# Patient Record
Sex: FEMALE | Race: WHITE | Employment: OTHER | URBAN - NONMETROPOLITAN AREA
[De-identification: names, ages, dates, MRNs, and addresses within clinical notes are randomized per-mention and may not be internally consistent; named-entity substitution may affect disease eponyms.]

---

## 2020-01-28 ENCOUNTER — OFFICE VISIT (OUTPATIENT)
Dept: OTOLARYNGOLOGY | Age: 70
End: 2020-01-28
Payer: MEDICARE

## 2020-01-28 ENCOUNTER — PROCEDURE VISIT (OUTPATIENT)
Dept: OTOLARYNGOLOGY | Age: 70
End: 2020-01-28
Payer: MEDICARE

## 2020-01-28 VITALS
WEIGHT: 149.38 LBS | SYSTOLIC BLOOD PRESSURE: 128 MMHG | TEMPERATURE: 97.7 F | DIASTOLIC BLOOD PRESSURE: 78 MMHG | BODY MASS INDEX: 29.33 KG/M2 | HEIGHT: 60 IN

## 2020-01-28 PROCEDURE — 3017F COLORECTAL CA SCREEN DOC REV: CPT | Performed by: NURSE PRACTITIONER

## 2020-01-28 PROCEDURE — G8417 CALC BMI ABV UP PARAM F/U: HCPCS | Performed by: NURSE PRACTITIONER

## 2020-01-28 PROCEDURE — G8400 PT W/DXA NO RESULTS DOC: HCPCS | Performed by: NURSE PRACTITIONER

## 2020-01-28 PROCEDURE — 4004F PT TOBACCO SCREEN RCVD TLK: CPT | Performed by: NURSE PRACTITIONER

## 2020-01-28 PROCEDURE — 1123F ACP DISCUSS/DSCN MKR DOCD: CPT | Performed by: NURSE PRACTITIONER

## 2020-01-28 PROCEDURE — 4040F PNEUMOC VAC/ADMIN/RCVD: CPT | Performed by: NURSE PRACTITIONER

## 2020-01-28 PROCEDURE — G8484 FLU IMMUNIZE NO ADMIN: HCPCS | Performed by: NURSE PRACTITIONER

## 2020-01-28 PROCEDURE — 92567 TYMPANOMETRY: CPT | Performed by: AUDIOLOGIST

## 2020-01-28 PROCEDURE — 69210 REMOVE IMPACTED EAR WAX UNI: CPT | Performed by: NURSE PRACTITIONER

## 2020-01-28 PROCEDURE — 99202 OFFICE O/P NEW SF 15 MIN: CPT | Performed by: NURSE PRACTITIONER

## 2020-01-28 PROCEDURE — 1090F PRES/ABSN URINE INCON ASSESS: CPT | Performed by: NURSE PRACTITIONER

## 2020-01-28 PROCEDURE — G8427 DOCREV CUR MEDS BY ELIG CLIN: HCPCS | Performed by: NURSE PRACTITIONER

## 2020-01-28 RX ORDER — ALPRAZOLAM 0.25 MG/1
TABLET ORAL
COMMUNITY
Start: 2014-02-07

## 2020-01-28 RX ORDER — LANOLIN ALCOHOL/MO/W.PET/CERES
3 CREAM (GRAM) TOPICAL DAILY
COMMUNITY

## 2020-01-28 RX ORDER — FLUTICASONE PROPIONATE 50 MCG
2 SPRAY, SUSPENSION (ML) NASAL DAILY
Qty: 1 BOTTLE | Refills: 5 | Status: SHIPPED | OUTPATIENT
Start: 2020-01-28

## 2020-01-28 SDOH — HEALTH STABILITY: MENTAL HEALTH: HOW OFTEN DO YOU HAVE A DRINK CONTAINING ALCOHOL?: NEVER

## 2020-01-28 ASSESSMENT — ENCOUNTER SYMPTOMS
EYES NEGATIVE: 1
RESPIRATORY NEGATIVE: 1
GASTROINTESTINAL NEGATIVE: 1

## 2020-01-28 NOTE — PROGRESS NOTES
Worry: None     Inability: None    Transportation needs:     Medical: None     Non-medical: None   Tobacco Use    Smoking status: Current Every Day Smoker     Packs/day: 0.50    Smokeless tobacco: Never Used   Substance and Sexual Activity    Alcohol use: Never     Frequency: Never    Drug use: None    Sexual activity: None   Lifestyle    Physical activity:     Days per week: None     Minutes per session: None    Stress: None   Relationships    Social connections:     Talks on phone: None     Gets together: None     Attends Temple service: None     Active member of club or organization: None     Attends meetings of clubs or organizations: None     Relationship status: None    Intimate partner violence:     Fear of current or ex partner: None     Emotionally abused: None     Physically abused: None     Forced sexual activity: None   Other Topics Concern    None   Social History Narrative    None          Current Outpatient Medications   Medication Sig Dispense Refill    ALPRAZolam (XANAX) 0.25 MG tablet TAKE 1 TABLET BY MOUTH THREE TIMES DAILY AS NEEDED FOR ANXIETY      ASA-APAP-Caff Buffered [phone] MG TABS Take 2 tablets by mouth      melatonin 3 MG TABS tablet Take 3 mg by mouth daily      fluticasone (FLONASE) 50 MCG/ACT nasal spray 2 sprays by Each Nostril route daily 1 Bottle 5     No current facility-administered medications for this visit. Review of Systems   Constitutional: Negative. HENT:        See HPI   Eyes: Negative. Respiratory: Negative. Cardiovascular: Negative. Gastrointestinal: Negative. Endocrine: Negative. Genitourinary: Negative. Musculoskeletal: Negative. Skin: Negative. Allergic/Immunologic: Positive for environmental allergies. Neurological: Negative. Hematological: Negative. Psychiatric/Behavioral: Negative.         Physical Exam  /78   Temp 97.7 °F (36.5 °C)   Ht 5' (1.524 m)   Wt 149 lb 6 oz (67.8 kg)   BMI 29.17 kg/m²     CONSTITUTIONAL:   well nourished, well-developed, alert, oriented, in no acute distress able to communicate normally, normal voice quality    HEAD & FACE: normocephalic, no lesions, atraumatic, no tenderness, no masses,appearance normal, no tenderness, no deformities, facial motion symmetric, parotid glands with no tenderness, no swelling, no masses, submandibular glands with normal size, nontender    EYES: ocular motility normal, eyelids normal, orbits normal, no proptosis, conjunctiva normal , pupils equal, round     EARS, NOSE & THROAT:  Hearing: hearing to conversational voice mildly impaired    Ears:     Right Ear:     External: external ears normal     Otoscopy Ear Canal: cerumen impaction removed with curette    Otoscopy TM: TM's normal       Left Ear:     External: external ears normal     Otoscopy Ear Canal: cerumen impaction removed with curette    Otoscopy TM: TM's normal      Nose:    External nose is without deformity    Nose: nares normal, septum midline, mucosa inflamed and mucosa congested      Nasal membranes are without lesions, vestibule within normal limits    Oral:      Lips: normal upper and lower lips without lesion    Teeth: good dentition    Oropharynx:normal Tonsils are normal to inspection without masses or lesions. Palate and uvula are normal.  Posterior pharynx without lesions or erythema. Oral         mucosa is moist without lesions. Tongue: normal tongue is normal to inspection without lesions, normal tongue mobility, lingual mucosa normal    Floor of mouth: Warthin ducts patent, mucosa normal       Laryngeal:      Hypopharynx: not examined     Larynx: not examined       NECK:     Inspection and Palpation: neck appearance normal, no masses or       Tenderness. LYMPHATIC: No cervical lymphadenopathy noted.       CHEST/RESPIRATORY: normal respiratory effort, no shortness of breath or stridor,       CARDIOVASCULAR: no cyanosis or edema      NEUROLOGICAL/PSYCHIATRIC: body will miss the nicotine at first, and you may feel short-tempered and grumpy. You may have trouble sleeping or concentrating. Medicine can help you deal with these symptoms. You may struggle with changing your smoking habits and rituals. The last step is the tricky one: Be prepared for the smoking urge to continue for a time. This is a lot to deal with, but keep at it. You will feel better. Follow-up care is a key part of your treatment and safety. Be sure to make and go to all appointments, and call your doctor if you are having problems. It's also a good idea to know your test results and keep a list of the medicines you take. How can you care for yourself at home? · Ask your family, friends, and coworkers for support. You have a better chance of quitting if you have help and support. · Join a support group, such as Nicotine Anonymous, for people who are trying to quit smoking. · Consider signing up for a smoking cessation program, such as the American Lung Association's Freedom from Smoking program.  · Get text messaging support. Go to the website at www.smokefree. gov to sign up for the [facility] program.  · Set a quit date. Pick your date carefully so that it is not right in the middle of a big deadline or stressful time. Once you quit, do not even take a puff. Get rid of all ashtrays and lighters after your last cigarette. Clean your house and your clothes so that they do not smell of smoke. · Learn how to be a nonsmoker. Think about ways you can avoid those things that make you reach for a cigarette. ? Avoid situations that put you at greatest risk for smoking. For some people, it is hard to have a drink with friends without smoking. For others, they might skip a coffee break with coworkers who smoke. ? Change your daily routine. Take a different route to work or eat a meal in a different place. · Cut down on stress.  Calm yourself or release tension by doing an activity you enjoy, such as reading a book, taking a hot bath, or gardening. · Talk to your doctor or pharmacist about nicotine replacement therapy, which replaces the nicotine in your body. You still get nicotine but you do not use tobacco. Nicotine replacement products help you slowly reduce the amount of nicotine you need. These products come in several forms, many of them available over-the-counter:  ? Nicotine patches  ? Nicotine gum and lozenges  ? Nicotine inhaler  · Ask your doctor about bupropion (Wellbutrin) or varenicline (Chantix), which are prescription medicines. They do not contain nicotine. They help you by reducing withdrawal symptoms, such as stress and anxiety. · Some people find hypnosis, acupuncture, and massage helpful for ending the smoking habit. · Eat a healthy diet and get regular exercise. Having healthy habits will help your body move past its craving for nicotine. · Be prepared to keep trying. Most people are not successful the first few times they try to quit. Do not get mad at yourself if you smoke again. Make a list of things you learned and think about when you want to try again, such as next week, next month, or next year. Where can you learn more? Go to https://[employer]. org and sign in to your [employer] account. Enter O159 in the Vig[employer] box to learn more about \"Stopping Smoking: Care Instructions. \"     If you do not have an account, please click on the \"Sign Up Now\" link. Current as of: July 4, 2019  Content Version: 12.3  © 9283-4134 Healthwise, Incorporated. Care instructions adapted under license by [facility] ([facility]). If you have questions about a medical condition or this instruction, always ask your healthcare professional. [place] [address] any warranty or liability for your use of this information. Return if symptoms worsen or fail to improve.

## 2020-01-28 NOTE — PROGRESS NOTES
Procedure Note  Removal of Cerumen impaction    Preprocedure Diagnosis:  Cerumen Impaction    Postprocedure Diagnosis:  Cerumen Impaction    PROCEDURE NOTE    PROCEDURE:cerumen removal .     ANESTHESIA:None     REASON FOR THE PROCEDURE:The patient presented with cerumen impaction . The patient verbally consented to intervention after a full discussion of risks, benefits, and alternatives. No guarantees were made or implied. DETAILS OF THE PROCEDURE:The patient was seated upright in the exam room chair. The open head otoscope was used to visualize the ear canal and tympanic membrane.  Cerumen was then removed from the both ears using a curette

## 2020-02-14 ENCOUNTER — TRANSCRIBE ORDERS (OUTPATIENT)
Dept: ADMINISTRATIVE | Facility: HOSPITAL | Age: 70
End: 2020-02-14

## 2020-02-14 DIAGNOSIS — F32.A DEPRESSION, UNSPECIFIED DEPRESSION TYPE: Primary | ICD-10-CM

## 2020-02-14 DIAGNOSIS — R91.1 PULMONARY NODULE: ICD-10-CM

## 2020-02-14 DIAGNOSIS — C64.2 MALIGNANT NEOPLASM OF LEFT KIDNEY (HCC): ICD-10-CM

## 2020-02-18 ENCOUNTER — HOSPITAL ENCOUNTER (OUTPATIENT)
Dept: CT IMAGING | Facility: HOSPITAL | Age: 70
Discharge: HOME | End: 2020-02-18

## 2020-02-18 DIAGNOSIS — F32.A DEPRESSION, UNSPECIFIED DEPRESSION TYPE: ICD-10-CM

## 2020-02-18 DIAGNOSIS — R91.1 PULMONARY NODULE: ICD-10-CM

## 2020-02-18 DIAGNOSIS — C64.2 MALIGNANT NEOPLASM OF LEFT KIDNEY (HCC): ICD-10-CM

## 2020-02-19 ENCOUNTER — HOSPITAL ENCOUNTER (OUTPATIENT)
Dept: CT IMAGING | Facility: HOSPITAL | Age: 70
Discharge: HOME | End: 2020-02-19
Admitting: INTERNAL MEDICINE

## 2020-02-19 PROCEDURE — 25010000002 IOPAMIDOL 61 % SOLUTION: Performed by: INTERNAL MEDICINE

## 2020-02-19 PROCEDURE — 71260 CT THORAX DX C+: CPT

## 2020-02-19 RX ADMIN — IOPAMIDOL 100 ML: 612 INJECTION, SOLUTION INTRAVENOUS at 13:15

## 2020-02-23 ENCOUNTER — TRANSCRIBE ORDERS (OUTPATIENT)
Dept: ADMINISTRATIVE | Facility: HOSPITAL | Age: 70
End: 2020-02-23

## 2020-02-23 DIAGNOSIS — C64.9 RENAL CELL CARCINOMA, UNSPECIFIED LATERALITY (HCC): ICD-10-CM

## 2020-02-23 DIAGNOSIS — C34.11 MALIGNANT NEOPLASM OF UPPER LOBE OF RIGHT LUNG (HCC): Primary | ICD-10-CM

## 2020-02-23 DIAGNOSIS — E27.8 ADRENAL NODULE (HCC): ICD-10-CM

## 2020-02-23 DIAGNOSIS — R91.1 PULMONARY NODULE: ICD-10-CM

## 2020-02-23 DIAGNOSIS — R91.8 MASS OF LEFT LUNG: ICD-10-CM

## 2020-03-03 ENCOUNTER — HOSPITAL ENCOUNTER (OUTPATIENT)
Dept: CT IMAGING | Facility: HOSPITAL | Age: 70
Discharge: HOME | End: 2020-03-03

## 2020-03-03 ENCOUNTER — HOSPITAL ENCOUNTER (OUTPATIENT)
Dept: CT IMAGING | Facility: HOSPITAL | Age: 70
Discharge: HOME | End: 2020-03-03
Admitting: INTERNAL MEDICINE

## 2020-03-03 DIAGNOSIS — C64.9 RENAL CELL CARCINOMA, UNSPECIFIED LATERALITY (HCC): ICD-10-CM

## 2020-03-03 DIAGNOSIS — E27.8 ADRENAL NODULE (HCC): ICD-10-CM

## 2020-03-03 DIAGNOSIS — R91.1 PULMONARY NODULE: ICD-10-CM

## 2020-03-03 DIAGNOSIS — C34.11 MALIGNANT NEOPLASM OF UPPER LOBE OF RIGHT LUNG (HCC): ICD-10-CM

## 2020-03-03 PROCEDURE — 78815 PET IMAGE W/CT SKULL-THIGH: CPT

## 2020-03-03 PROCEDURE — 0 FLUDEOXYGLUCOSE F18 SOLUTION: Performed by: INTERNAL MEDICINE

## 2020-03-03 PROCEDURE — A9552 F18 FDG: HCPCS | Performed by: INTERNAL MEDICINE

## 2020-03-03 RX ADMIN — FLUDEOXYGLUCOSE F18 1 DOSE: 300 INJECTION INTRAVENOUS at 11:17

## 2020-03-03 NOTE — NURSING NOTE
Pt for lung biopsy 3/4/2020. Procedure cancelled per Dr Link. Area not safely accessible. Spoke with Erika Jiang's nurse regarding above.

## 2020-03-04 ENCOUNTER — HOSPITAL ENCOUNTER (OUTPATIENT)
Dept: CT IMAGING | Facility: HOSPITAL | Age: 70
Discharge: HOME | End: 2020-03-04

## 2020-03-27 ENCOUNTER — CONSULT (OUTPATIENT)
Dept: ONCOLOGY | Facility: CLINIC | Age: 70
End: 2020-03-27

## 2020-03-27 ENCOUNTER — APPOINTMENT (OUTPATIENT)
Dept: LAB | Facility: HOSPITAL | Age: 70
End: 2020-03-27

## 2020-03-27 VITALS
BODY MASS INDEX: 28.62 KG/M2 | RESPIRATION RATE: 16 BRPM | DIASTOLIC BLOOD PRESSURE: 42 MMHG | HEART RATE: 62 BPM | OXYGEN SATURATION: 95 % | TEMPERATURE: 97.6 F | SYSTOLIC BLOOD PRESSURE: 128 MMHG | WEIGHT: 145.8 LBS | HEIGHT: 60 IN

## 2020-03-27 DIAGNOSIS — C76.0 MALIGNANT NEOPLASM OF HEAD, FACE AND NECK (HCC): Primary | ICD-10-CM

## 2020-03-27 DIAGNOSIS — Z45.2 ENCOUNTER FOR CARE RELATED TO PORT-A-CATH: ICD-10-CM

## 2020-03-27 PROBLEM — Z90.81 H/O SPLENECTOMY: Status: ACTIVE | Noted: 2017-06-01

## 2020-03-27 LAB
ALBUMIN SERPL-MCNC: 4.3 G/DL (ref 3.5–5.2)
ALBUMIN/GLOB SERPL: 1.8 G/DL
ALP SERPL-CCNC: 71 U/L (ref 39–117)
ALT SERPL W P-5'-P-CCNC: 15 U/L (ref 1–33)
ANION GAP SERPL CALCULATED.3IONS-SCNC: 20 MMOL/L (ref 5–15)
AST SERPL-CCNC: 21 U/L (ref 1–32)
BASOPHILS # BLD AUTO: 0.07 10*3/MM3 (ref 0–0.2)
BASOPHILS NFR BLD AUTO: 0.8 % (ref 0–1.5)
BILIRUB SERPL-MCNC: 0.3 MG/DL (ref 0.2–1.2)
BUN BLD-MCNC: 24 MG/DL (ref 8–23)
BUN/CREAT SERPL: 22.4 (ref 7–25)
CALCIUM SPEC-SCNC: 9.2 MG/DL (ref 8.6–10.5)
CHLORIDE SERPL-SCNC: 97 MMOL/L (ref 98–107)
CO2 SERPL-SCNC: 26 MMOL/L (ref 22–29)
CREAT BLD-MCNC: 1.07 MG/DL (ref 0.57–1)
DEPRECATED RDW RBC AUTO: 47.2 FL (ref 37–54)
EOSINOPHIL # BLD AUTO: 0.32 10*3/MM3 (ref 0–0.4)
EOSINOPHIL NFR BLD AUTO: 3.6 % (ref 0.3–6.2)
ERYTHROCYTE [DISTWIDTH] IN BLOOD BY AUTOMATED COUNT: 13.8 % (ref 12.3–15.4)
GFR SERPL CREATININE-BSD FRML MDRD: 51 ML/MIN/1.73
GLOBULIN UR ELPH-MCNC: 2.4 GM/DL
GLUCOSE BLD-MCNC: 133 MG/DL (ref 65–99)
HCT VFR BLD AUTO: 38.5 % (ref 34–46.6)
HGB BLD-MCNC: 13.2 G/DL (ref 12–15.9)
HOLD SPECIMEN: NORMAL
HOLD SPECIMEN: NORMAL
IMM GRANULOCYTES # BLD AUTO: 0.02 10*3/MM3 (ref 0–0.05)
IMM GRANULOCYTES NFR BLD AUTO: 0.2 % (ref 0–0.5)
LYMPHOCYTES # BLD AUTO: 2.3 10*3/MM3 (ref 0.7–3.1)
LYMPHOCYTES NFR BLD AUTO: 25.6 % (ref 19.6–45.3)
MCH RBC QN AUTO: 32.1 PG (ref 26.6–33)
MCHC RBC AUTO-ENTMCNC: 34.3 G/DL (ref 31.5–35.7)
MCV RBC AUTO: 93.7 FL (ref 79–97)
MONOCYTES # BLD AUTO: 0.72 10*3/MM3 (ref 0.1–0.9)
MONOCYTES NFR BLD AUTO: 8 % (ref 5–12)
NEUTROPHILS # BLD AUTO: 5.55 10*3/MM3 (ref 1.7–7)
NEUTROPHILS NFR BLD AUTO: 61.8 % (ref 42.7–76)
NRBC BLD AUTO-RTO: 0 /100 WBC (ref 0–0.2)
PLATELET # BLD AUTO: 362 10*3/MM3 (ref 140–450)
PMV BLD AUTO: 9.9 FL (ref 6–12)
POTASSIUM BLD-SCNC: 4.7 MMOL/L (ref 3.5–5.2)
PROT SERPL-MCNC: 6.7 G/DL (ref 6–8.5)
RBC # BLD AUTO: 4.11 10*6/MM3 (ref 3.77–5.28)
SODIUM BLD-SCNC: 143 MMOL/L (ref 136–145)
WBC NRBC COR # BLD: 8.98 10*3/MM3 (ref 3.4–10.8)

## 2020-03-27 PROCEDURE — 99205 OFFICE O/P NEW HI 60 MIN: CPT | Performed by: INTERNAL MEDICINE

## 2020-03-27 PROCEDURE — 36415 COLL VENOUS BLD VENIPUNCTURE: CPT | Performed by: INTERNAL MEDICINE

## 2020-03-27 PROCEDURE — 80053 COMPREHEN METABOLIC PANEL: CPT | Performed by: INTERNAL MEDICINE

## 2020-03-27 PROCEDURE — 85025 COMPLETE CBC W/AUTO DIFF WBC: CPT | Performed by: INTERNAL MEDICINE

## 2020-03-27 RX ORDER — PYRIDOXINE HCL (VITAMIN B6) 50 MG
50 TABLET ORAL DAILY
COMMUNITY
End: 2020-07-16 | Stop reason: ALTCHOICE

## 2020-03-27 RX ORDER — ALPRAZOLAM 0.25 MG/1
0.25 TABLET ORAL AS NEEDED
COMMUNITY
Start: 2014-02-07 | End: 2021-07-26

## 2020-03-27 RX ORDER — SODIUM CHLORIDE 0.9 % (FLUSH) 0.9 %
10 SYRINGE (ML) INJECTION AS NEEDED
Status: CANCELLED | OUTPATIENT
Start: 2020-03-27

## 2020-03-27 RX ORDER — HEPARIN SODIUM (PORCINE) LOCK FLUSH IV SOLN 100 UNIT/ML 100 UNIT/ML
500 SOLUTION INTRAVENOUS AS NEEDED
Status: CANCELLED | OUTPATIENT
Start: 2020-03-27

## 2020-03-27 RX ORDER — SODIUM CHLORIDE 0.9 % (FLUSH) 0.9 %
10 SYRINGE (ML) INJECTION AS NEEDED
Status: DISCONTINUED | OUTPATIENT
Start: 2020-03-27 | End: 2020-04-14 | Stop reason: HOSPADM

## 2020-03-27 RX ORDER — HEPARIN SODIUM (PORCINE) LOCK FLUSH IV SOLN 100 UNIT/ML 100 UNIT/ML
500 SOLUTION INTRAVENOUS AS NEEDED
Status: DISCONTINUED | OUTPATIENT
Start: 2020-03-27 | End: 2020-04-14 | Stop reason: HOSPADM

## 2020-03-27 RX ORDER — LANOLIN ALCOHOL/MO/W.PET/CERES
3 CREAM (GRAM) TOPICAL NIGHTLY
COMMUNITY

## 2020-03-27 RX ADMIN — HEPARIN SODIUM (PORCINE) LOCK FLUSH IV SOLN 100 UNIT/ML 500 UNITS: 100 SOLUTION at 11:38

## 2020-03-27 RX ADMIN — Medication 10 ML: at 11:36

## 2020-04-24 DIAGNOSIS — C77.0 METASTATIC CANCER TO CERVICAL LYMPH NODES (HCC): Primary | ICD-10-CM

## 2020-04-27 ENCOUNTER — TELEPHONE (OUTPATIENT)
Dept: ONCOLOGY | Facility: CLINIC | Age: 70
End: 2020-04-27

## 2020-04-27 NOTE — TELEPHONE ENCOUNTER
Patient said missed appointment due to not going to pulmonary doctor yet and wanted us to know. Said did not want to reschedule at this time and and will callback when ready to schedule.

## 2020-05-27 ENCOUNTER — OFFICE VISIT (OUTPATIENT)
Dept: PULMONOLOGY | Facility: CLINIC | Age: 70
End: 2020-05-27

## 2020-05-27 VITALS
HEART RATE: 76 BPM | OXYGEN SATURATION: 95 % | RESPIRATION RATE: 16 BRPM | DIASTOLIC BLOOD PRESSURE: 78 MMHG | HEIGHT: 60 IN | BODY MASS INDEX: 28.27 KG/M2 | WEIGHT: 144 LBS | TEMPERATURE: 98.4 F | SYSTOLIC BLOOD PRESSURE: 126 MMHG

## 2020-05-27 DIAGNOSIS — Z85.118 HISTORY OF LUNG CANCER: ICD-10-CM

## 2020-05-27 DIAGNOSIS — R91.1 PULMONARY NODULE: ICD-10-CM

## 2020-05-27 DIAGNOSIS — C64.9 MALIGNANT NEOPLASM OF KIDNEY, UNSPECIFIED LATERALITY (HCC): ICD-10-CM

## 2020-05-27 DIAGNOSIS — R91.1 NODULE OF UPPER LOBE OF LEFT LUNG: Primary | ICD-10-CM

## 2020-05-27 DIAGNOSIS — C77.0 METASTATIC CANCER TO CERVICAL LYMPH NODES (HCC): ICD-10-CM

## 2020-05-27 PROCEDURE — 99204 OFFICE O/P NEW MOD 45 MIN: CPT | Performed by: INTERNAL MEDICINE

## 2020-05-27 NOTE — PROGRESS NOTES
"Subjective   Jennie Molina is a 69 y.o. female.     Background: pt with hypermetabolic 1.5 cm nodule in lingula, fibrosis, smaller nodule 6mm right base.  hx urothelial cancer treated in [place]    Chief Complaint   Patient presents with   • \"Follow up on spot on my lung\".        History of Present Illness   I am asked to see her to evaluate a nodule in the left upper lobe.  This was not present on a scan in 2016 based on my review of records from [facility] in [place].  Outside records from there as well as [facility] indicate prior history of lung cancer on the right side resected and more recently urothelial cancer of the  system.  This is all been treated.  A more recent surveillance CT has identified an asymptomatic lesion in the left upper lobe adjacent to the fissure.  This is been present for less than 40 years continuously and worsening, and of uncertain severity.  A PET scan was done which showed hypermetabolism in this lesion.  Audrey has asked me to see her.    Medical/Family/Social History   has a past medical history of Cancer (CMS/[facility]), Encounter for care related to Port-a-Cath (3/27/2020), and Renal disorder.   has a past surgical history that includes  section; Nephrectomy (Left); Abdominal surgery; Lung removal, partial (Right); Appendectomy; Cholecystectomy; and Hernia repair.  family history includes Cancer in her sister; No Known Problems in her brother, father, maternal aunt, maternal grandfather, maternal grandmother, maternal uncle, mother, paternal aunt, paternal grandfather, paternal grandmother, paternal uncle, and another family member.   reports that she has been smoking cigarettes. She started smoking about 54 years ago. She has a 41.25 pack-year smoking history. She has never used smokeless tobacco. She reports that she does not use drugs.  Allergies   Allergen Reactions   • Iodides Hives and Itching     HIVES AND ITCHING POST CT STUDY ON " "9/4/15     2-24-16 WITH PREMEDICATION PT STILL HAD A REACTION WITH HIVES AND THROAT TIGHTENING   HIVES AND ITCHING POST CT STUDY ON 9/4/15     2-24-16 WITH PREMEDICATION PT STILL HAD A REACTION WITH HIVES AND THROAT TIGHTENING      • Iodinated Diagnostic Agents Hives     Medications    Current Outpatient Medications:   •  ALPRAZolam (XANAX) 0.25 MG tablet, Take 0.25 mg by mouth., Disp: , Rfl:   •  melatonin 3 MG tablet, Take 3 mg by mouth., Disp: , Rfl:   •  SUPER B COMPLEX/C PO, Take  by mouth., Disp: , Rfl:   •  pyridoxine (VITAMIN B-6) 50 MG tablet, Take 50 mg by mouth Daily., Disp: , Rfl:     Review of Systems   Constitutional: Negative for chills and fever.   HENT: Negative for trouble swallowing and voice change.    Eyes: Negative for photophobia and visual disturbance.   Respiratory: Negative for shortness of breath.    Gastrointestinal: Negative for abdominal pain, nausea, vomiting and GERD.   Genitourinary: Negative for difficulty urinating and hematuria.   Musculoskeletal: Negative for gait problem and joint swelling.   Skin: Negative for pallor and skin lesions.   Neurological: Negative for tremors, weakness and confusion.   Hematological: Negative for adenopathy. Does not bruise/bleed easily.   Psychiatric/Behavioral: The patient is not nervous/anxious.          Objective   /78   Pulse 76   Temp 98.4 °F (36.9 °C)   Resp 16   Ht 152.4 cm (60\")   Wt 65.3 kg (144 lb)   SpO2 95% Comment: RA  Breastfeeding No   BMI 28.12 kg/m²   Physical Exam   Constitutional: She is oriented to person, place, and time. She appears well-developed. She does not appear ill. No distress.   HENT:   Head: Atraumatic.   Facemask in place   Eyes: Conjunctivae and EOM are normal. No scleral icterus.   Neck: Neck supple.   Cardiovascular: Normal rate, regular rhythm, S1 normal and S2 normal.   Pulmonary/Chest: Effort normal and breath sounds normal. No stridor. No respiratory distress. She has no wheezes. She has no " rales.   Abdominal: Soft. She exhibits no distension. There is no tenderness. There is no guarding.   Musculoskeletal: She exhibits no edema or deformity.   Neurological: She is alert and oriented to person, place, and time. She exhibits normal muscle tone.   Skin: No rash noted. No erythema. No pallor.   Psychiatric: She has a normal mood and affect.      Whole body PET/CT     Indication: History of lung cancer status post partial RIGHT lobectomy  2004. Also has a history of metastatic renal cell carcinoma treated with  LEFT nephrectomy and chemoradiation 8 years ago. New pulmonary nodules  on recent CT.     Comparison: 02/19/2020     11.4 mCi F-18 FDG was administered IV per protocol via the RIGHT hand.  Blood glucose at the time of injection was 100 a mg/dl.     PET/CT images were obtained from the base of the skull to the proximal  femurs approximately 60 minutes after radiotracer administration.  Noncontrast CT images of the neck, chest, abdomen, and pelvis were  obtained for attenuation correction and anatomic localization. DLP: 542  mGy*cm     Findings:  Background right hepatic lobe metabolic activity measures max SUV 2.93.     *  1.5 cm lobulated pulmonary nodule in the lingula abutting the major  fissure is hypermetabolic with a max SUV of 6.5.  *  Small 6 mm nodular scarlike focus in the RIGHT lung base described on  the prior CT report is not FDG avid but too small to accurately  characterize on PET CT.  *  No other hypermetabolic pulmonary nodule.  *  No hypermetabolic lymph nodes.  *  No abnormal hypermetabolic activity in the neck, abdomen, or pelvis.     Other findings:      No acute orbital finding. Parotid glands appear normal. Parapharyngeal  fat planes are maintained. No neck mass identified.     RIGHT chest wall port catheter tip is at the cavoatrial junction. No  pericardial effusion. No axillary, supraclavicular, mediastinal, or  hilar adenopathy. Main pulmonary trunk and thoracic aorta are  within  normal limits in caliber.     Central airways are clear. Moderate emphysema. Prior RIGHT upper  lobectomy. No consolidation or pleural effusion.     Small RIGHT para midline ventral abdominal wall fat-containing hernia.  Unenhanced liver and pancreas appear unremarkable. 2 cm RIGHT adrenal  gland nodule, most compatible with adenoma. Spleen and LEFT kidney are  surgically absent. No right-sided urinary tract calculi. RIGHT  extrarenal pelvis. No focal urinary bladder abnormality. Uterus appears  unremarkable.     Rectosigmoid anastomosis appears unremarkable. A few scattered colonic  diverticula are present. No abnormal bowel distention or adjacent  inflammation. No ascites or free pelvic fluid. No pelvic mass.     Normal caliber abdominal aorta with atherosclerotic calcification. No  enlarged abdominal or pelvic lymph nodes. No suspicious focal bone  lesion. Multilevel lumbar spine degenerative change.     IMPRESSION:     1.  Hypermetabolic 1.5 cm pulmonary nodule in the lingula, highly  suspicious for metastatic disease or primary lung neoplasm. No other  evidence of metastatic disease or abnormal FDG uptake.  2.  2 cm LEFT adrenal gland nodule, most compatible with adenoma.  3.  6 mm pulmonary nodule at the RIGHT lung base in a background  scarring. This nodule is non-FDG avid but too small to accurately  characterize by PET/CT.  This report was finalized on 03/03/2020 13:52 by Dr. Danny Cochran MD.    My interpretation of CT: Hypermetabolic nodule in the left upper lobe, other chronic postsurgical findings.         -----------------------------------------------------------------------------------------------  Assessment/Plan   Problem List Items Addressed This Visit        Pulmonary Problems    Pulmonary nodule       Other    Malignant neoplasm of kidney (CMS/HCC)    Overview     L urothelial carcinoma, metastatic; S/P left nepherectomy, chemo and radiation.  Will be beginning 3rd course of chemo for  "presumed metastatic lesion of GI tract.  Dr Calhoun is medical oncologist.         Metastatic cancer to cervical lymph nodes (CMS/HCC)    History of lung cancer      Other Visit Diagnoses     Nodule of upper lobe of left lung    -  Primary    Relevant Orders    Ambulatory Referral to Pulmonology (Completed)        Patient's Body mass index is 28.12 kg/m². BMI is within normal parameters. No follow-up required..      This is a patient with a hypermetabolic nodule with a 2 cancers in her history.  This is certainly worrisome for either a bronchogenic carcinoma or metastatic disease to the left lung.  The lesion appears to be in the lingula.  There does appear to be a bronchus sign.  I think this would be most amenable to navigational bronchoscopy to get good tissue sampling of this area.  We discussed that modality is not available locally we will make referral to the [facility] for further evaluation.  In the meantime I do not recommend additional testing or medication.  She does note prior radiation treatment and indicates she has gotten \"the maximum\" radiation for her lifetime.  We did discuss interventions such as stereotactic radiation or surgery if indicated.       Electronically signed by Santi Enriquez MD, 5/27/2020, 11:21    "

## 2020-06-01 ENCOUNTER — TRANSCRIBE ORDERS (OUTPATIENT)
Dept: ADMINISTRATIVE | Facility: HOSPITAL | Age: 70
End: 2020-06-01

## 2020-06-01 ENCOUNTER — HOSPITAL ENCOUNTER (OUTPATIENT)
Dept: CT IMAGING | Facility: HOSPITAL | Age: 70
Discharge: HOME | End: 2020-06-01
Admitting: INTERNAL MEDICINE

## 2020-06-01 DIAGNOSIS — R91.1 LUNG NODULE: Primary | ICD-10-CM

## 2020-06-01 PROCEDURE — 71250 CT THORAX DX C-: CPT

## 2020-06-22 NOTE — PROGRESS NOTES
RICKEY Minor  [facility]   Respiratory Disease Clinic  [address] [place] [address]  Phone: [phone]  Fax: [phone]     Jennie Molina is a 69 y.o. female.   CC:   Chief Complaint   Patient presents with   • Here to discuss test results.        HPI:  Ms. Molina is a pleasant 69 year old female with known history of Non small cell lung cancer in 2004 with right lobectomy. 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 She is a smoker of 55+ years. She was referred to Dr. Enriquez for increasing size of new nodule in the lingular that was hypermetabolic on PET. She was sent to Dr. Hare at [facility] for Navigational bronchoschopy. Pathology results indicate primary squamous cell carcinoma and not metastatic. She currently follows Dr. Ventura in Oncology and just recently established care with them.  She is here today for her results. She reports she has seen Dr. Corral in the past but with the doctor leaving [facility] and a disconnect with the doctor, she would like referral to someone else. She reports a 10 lb weight loss over 6 months. She continues to work at [employer]. She has occasional SOB with allergy flares. She continues to smoke 3/4 PPD x 55 years.     Patient denies fever, chills, cough, shortness of breath or difficulty breathing, fatigue, muscle or body aches, new onset headache, new loss of taste or smell, sore throat, congestion or runny nose, nausea or vomiting, diarrhea.  Patient denies any known exposure to a person under investigation or positive for COVID-19.  Patient is wearing mask today. She has a friend with her who is also wearing a mask.       The following portions of the patient's history were reviewed and updated as appropriate: allergies, current medications, past family history, past medical history, past social history, past surgical history and  problem list.    Past Medical History:   Diagnosis Date   • Cancer (CMS/HCC)    • Encounter for care related to Port-a-Cath 3/27/2020   • Renal disorder        Family History   Problem Relation Age of Onset   • No Known Problems Mother    • No Known Problems Father    • Cancer Sister    • No Known Problems Maternal Grandmother    • No Known Problems Maternal Grandfather    • No Known Problems Paternal Grandmother    • No Known Problems Paternal Grandfather    • No Known Problems Brother    • No Known Problems Maternal Aunt    • No Known Problems Maternal Uncle    • No Known Problems Paternal Aunt    • No Known Problems Paternal Uncle    • No Known Problems Other    • Asthma Neg Hx    • Diabetes Neg Hx    • Emphysema Neg Hx    • Heart failure Neg Hx    • Hypertension Neg Hx        Social History     Socioeconomic History   • Marital status:      Spouse name: Not on file   • Number of children: Not on file   • Years of education: Not on file   • Highest education level: Not on file   Tobacco Use   • Smoking status: Current Every Day Smoker     Packs/day: 0.75     Years: 55.00     Pack years: 41.25     Types: Cigarettes     Start date: 1966   • Smokeless tobacco: Never Used   Substance and Sexual Activity   • Drug use: Never       Review of Systems   Constitutional: Negative for chills, diaphoresis, fatigue and fever.   HENT: Negative for congestion, rhinorrhea and trouble swallowing.    Eyes: Negative for blurred vision, double vision and visual disturbance.   Respiratory: Negative for cough, shortness of breath and wheezing.    Cardiovascular: Negative for chest pain, palpitations and leg swelling.   Gastrointestinal: Negative for abdominal distention, abdominal pain and nausea.   Endocrine: Negative for cold intolerance, heat intolerance and polydipsia.   Musculoskeletal: Negative for back pain, gait problem and myalgias.   Skin: Negative for color change, pallor and rash.   Neurological: Negative for  "dizziness, syncope and confusion.   Hematological: Negative for adenopathy. Does not bruise/bleed easily.   Psychiatric/Behavioral: Negative for agitation, behavioral problems and sleep disturbance.       /76   Pulse 68   Temp 99 °F (37.2 °C)   Resp 16   Ht 152.4 cm (60\")   Wt 65.8 kg (145 lb)   SpO2 96%   Breastfeeding No   BMI 28.32 kg/m²     Physical Exam   Constitutional: She is oriented to person, place, and time. She appears well-developed and well-nourished. No distress.   HENT:   Head: Normocephalic and atraumatic.   Eyes: Pupils are equal, round, and reactive to light. Conjunctivae are normal.   Neck: Normal range of motion. Neck supple.   Cardiovascular: Normal rate, regular rhythm and normal heart sounds. Exam reveals no gallop and no friction rub.   No murmur heard.  Pulmonary/Chest: Effort normal and breath sounds normal. No respiratory distress. She has no wheezes.   Abdominal: Soft. Bowel sounds are normal.   Musculoskeletal: Normal range of motion. She exhibits no edema or deformity.   Lymphadenopathy:     She has no cervical adenopathy.   Neurological: She is alert and oriented to person, place, and time.   Skin: Skin is warm and dry. She is not diaphoretic. No erythema.   Psychiatric: She has a normal mood and affect. Her behavior is normal. Judgment and thought content normal.       Pulmonary Functions Testing Results:      No results found for this or any previous visit.    My interpretation: None     CXR: post bronch was negative for PTX     CT Chest 06/01/20   IMPRESSION:  1. There appears to be slightly increased size of lingula nodule  measuring 1.7 cm, previously 1.5 cm. This is concerning for malignancy.  2. Decreased size of right lower lobe pulmonary nodule now measuring 0.3  cm, previously 0.6 cm.  3. Stable postoperative changes at the right hilum. Left nephrectomy.  4. Stable right adrenal low-density lesion, favored to represent an  adenoma.  This report was finalized on " "06/01/2020 16:10 by Dr Mae Santiago MD.      Problem List Items Addressed This Visit        Immune and Lymphatic    Metastatic cancer to cervical lymph nodes (CMS/HCC)       Genitourinary    Malignant neoplasm of kidney (CMS/HCC)    Overview     L urothelial carcinoma, metastatic; S/P left nepherectomy, chemo and radiation.  Will be beginning 3rd course of chemo for presumed metastatic lesion of GI tract.  Dr Calhoun is medical oncologist.            Other    H/O splenectomy    History of lung cancer      Other Visit Diagnoses     SCC (squamous cell carcinoma of lung), left (CMS/HCC)    -  Primary    Tobacco abuse            Patient's Body mass index is 28.32 kg/m². BMI is within normal parameters. No follow-up required..      Assessment/Plan        She has tried Chantix twice and followed a program. She did not like having to set a quit date and felt like it was having a \"gun to my head\". We dicussed smoking cessation for about 3-5 minutes and included addressing the habit she has formed as well. This could include using suckers while utilizing the patches.     We discussed her pathology results and that this is likely a primary SCC of the left lung. We will get her referred back to Oncology. She had a disconnect with Dr. Ramos and also heard she was leaving town. We will set her up with one of the other oncologist at . I have asked her to return to the office in 3 months.       Anneliese Dave, RICKEY  6/24/2020  09:24    No follow-ups on file.    This dictation was generated by voice recognition computer software. Although all attempts are made to edit dictation for accuracy, there may be errors in the transcription that are not intended.   "

## 2020-06-24 ENCOUNTER — OFFICE VISIT (OUTPATIENT)
Dept: PULMONOLOGY | Facility: CLINIC | Age: 70
End: 2020-06-24

## 2020-06-24 VITALS
HEART RATE: 68 BPM | RESPIRATION RATE: 16 BRPM | BODY MASS INDEX: 28.47 KG/M2 | TEMPERATURE: 99 F | DIASTOLIC BLOOD PRESSURE: 76 MMHG | WEIGHT: 145 LBS | OXYGEN SATURATION: 96 % | SYSTOLIC BLOOD PRESSURE: 132 MMHG | HEIGHT: 60 IN

## 2020-06-24 DIAGNOSIS — C34.92 SCC (SQUAMOUS CELL CARCINOMA OF LUNG), LEFT (HCC): Primary | ICD-10-CM

## 2020-06-24 DIAGNOSIS — C64.2 MALIGNANT NEOPLASM OF LEFT KIDNEY (HCC): ICD-10-CM

## 2020-06-24 DIAGNOSIS — Z85.118 HISTORY OF LUNG CANCER: ICD-10-CM

## 2020-06-24 DIAGNOSIS — Z90.81 H/O SPLENECTOMY: ICD-10-CM

## 2020-06-24 DIAGNOSIS — C77.0 METASTATIC CANCER TO CERVICAL LYMPH NODES (HCC): ICD-10-CM

## 2020-06-24 DIAGNOSIS — Z72.0 TOBACCO ABUSE: ICD-10-CM

## 2020-06-24 PROCEDURE — 99214 OFFICE O/P EST MOD 30 MIN: CPT | Performed by: NURSE PRACTITIONER

## 2020-07-12 NOTE — PROGRESS NOTES
[facility] HEMATOLOGY AND ONCOLOGY  [address]  [place] [address] [phone]    Patient Name: Jennie Molina  Encounter Date: 07/16/2020  YOB: 1950  Patient Number: [id number]    Initial Note    REASON FOR CONSULTATION: Squamous cell lung cancer    HISTORY OF PRESENT ILLNESS: Jennie Molina is a 69-year-old female whose history includes non-small cell lung cancer in the right upper lobe, status post partial resection, 2004, active tobacco use (55+ years smoking history), urothelial carcinoma with positive supraclavicular lymph node biopsy, and left nephrectomy, 2009.  Details unclear, but apparently received chemotherapy and radiation.    Surveillance CTs in October 2019 was found to have a new 5 mm nodule in the left lingula.    02/19/2020-CT chest.  Impression: Largest 1.5 cm nodule at the lingula and new 5 mm pulmonary nodule (reported on outside scan not available).  Changes of right upper lobe resection.  Emphysema.  Right hydronephrosis versus extrarenal pelvis (outside imaging report 9/18/2019 indicates extrarenal pelvis but images not available for comparison).  Right adrenal nodule, incompletely characterized.  Prior splenectomy.  Possible left adrenalectomy and nephrectomy.    03/03/2020-PET scan.  Hypermetabolic 1.5 cm pulmonary nodule in the lingula, highly suspicious for metastatic disease or primary lung neoplasm.  No other evidence of metastatic disease or abnormal FDG uptake.  2 cm left adrenal gland nodule, most compatible with adenoma.  6 mm pulmonary nodule at the right lung base in a background of scarring.  Non-FDG avid but too small to accurately characterize by PET scan.    03/27/2020- seen for medical oncology by Dr. Ventura who referred her to Dr. Enriquez of pulmonary in assessment of tissue biopsy.    05/27/2020- seen by Dr. Enriquez of pulmonary in assessment of the left upper lobe nodule.  Review of  "records from [facility] and [facility] indicate prior history of lung cancer on the right side resected and more recently urothelial cancer of the  system.  All have been treated.  More recent surveillance CT identified an asymptomatic lesion in the left upper lobe adjacent to the fissure.  PET scan showed hypermetabolism in this lesion.  Patient was referred to [place] for possible navigational bronchoscopy/biopsy of the lesion that appears to be in the lingula.  Patient says she has received \"the maximum\" radiation for her lifetime.    06/01/2020-CT chest.  Comparison 02/19/2020, 03/20/2020.  Impression: Slight increase in size of lingular nodule measuring 1.7 cm (previously 1.5 cm) concerning for malignancy.  Decreased size of right lower lobe pulmonary nodule now measures 0.3 cm (from 0.6 cm).  Stable postoperative changes in the right hilum.  Left nephrectomy.  Stable right adrenal low-density lesion, favored to represent an adenoma.    06/10/2020- [facility] (Dr. Hare) bronchoscopy with biopsy.  Pathology: Lung nodule, lingula, transbronchial needle aspiration with cell block: Involved by carcinoma, favor squamous cell carcinoma.    Comment: Immunohistochemistry demonstrates neoplastic cells to be positive for P 40 and negative for ELBA 3, TTF-1 and CK7 consistent with squamous differentiation.  The specimen consists of detached fragments of tumor not appear to be squamous cell carcinoma in situ lining bronchial tissue.  No tumor invading stroma identified in the specimen.  Would favor primary lung carcinoma.    06/10/2020-hemoglobin 12.6, hematocrit 37, MCV 95, platelets 346,000, WBC 10.3.  BMP with calcium of 8.1 otherwise normal with creatinine 0.99.    The patient is seen for subsequent management considerations.    LABS    Lab Results - Last 18 Months   Lab Units 03/27/20  0927   HEMOGLOBIN g/dL 13.2   HEMATOCRIT % 38.5   MCV fL " 93.7   WBC 10*3/mm3 8.98   RDW % 13.8   MPV fL 9.9   PLATELETS 10*3/mm3 362   IMM GRAN % % 0.2   NEUTROS ABS 10*3/mm3 5.55   LYMPHS ABS 10*3/mm3 2.30   MONOS ABS 10*3/mm3 0.72   EOS ABS 10*3/mm3 0.32   BASOS ABS 10*3/mm3 0.07   IMMATURE GRANS (ABS) 10*3/mm3 0.02   NRBC /100 WBC 0.0       Lab Results - Last 18 Months   Lab Units 20  0927   GLUCOSE mg/dL 133*   SODIUM mmol/L 143   POTASSIUM mmol/L 4.7   CO2 mmol/L 26.0   CHLORIDE mmol/L 97*   ANION GAP mmol/L 20.0*   CREATININE mg/dL 1.07*   BUN mg/dL 24*   BUN / CREAT RATIO  22.4   CALCIUM mg/dL 9.2   EGFR IF NONAFRICN AM mL/min/1.73 51*   ALK PHOS U/L 71   TOTAL PROTEIN g/dL 6.7   ALT (SGPT) U/L 15   AST (SGOT) U/L 21   BILIRUBIN mg/dL 0.3   ALBUMIN g/dL 4.30   GLOBULIN gm/dL 2.4         PAST MEDICAL HISTORY:  ALLERGIES:  Allergies   Allergen Reactions   • Iodides Hives and Itching     HIVES AND ITCHING POST CT STUDY ON 9/4/15     2-24-16 WITH PREMEDICATION PT STILL HAD A REACTION WITH HIVES AND THROAT TIGHTENING   HIVES AND ITCHING POST CT STUDY ON 9/4/15     2-24-16 WITH PREMEDICATION PT STILL HAD A REACTION WITH HIVES AND THROAT TIGHTENING      • Iodinated Diagnostic Agents Hives     CURRENT MEDICATIONS:  Outpatient Encounter Medications as of 2020   Medication Sig Dispense Refill   • ALPRAZolam (XANAX) 0.25 MG tablet Take 0.25 mg by mouth As Needed.     • melatonin 3 MG tablet Take 3 mg by mouth.     • naproxen sodium (ALEVE) 220 MG tablet Take 220 mg by mouth 2 (Two) Times a Day As Needed.     • SUPER B COMPLEX/C PO Take  by mouth.     • pyridoxine (VITAMIN B-6) 50 MG tablet Take 50 mg by mouth Daily.       No facility-administered encounter medications on file as of 2020.        Adult illnesses:  Left urothelial carcinoma, metastatic with positive supraclavicular lymph node biopsy, 2009.  Lung cancer  Hives after CT contrast  Active tobacco use (55+ years)    Past surgeries:  Port-A-Cath placement, 3/27/2020   section  Left  nephrectomy/adrenalectomy  Right upper lobe lung resection, 2004  Appendectomy  Cholecystectomy  Splenectomy  Hernia repair  Cataracts removed with lenses  Blepharoplasty, OU  06/10/2020- transbronchial needle aspiration of lingular lung nodule ([place]).  Carcinoma, favor squamous cell carcinoma.    ADULT ILLNESSES:  Patient Active Problem List   Diagnosis Code   • Abnormal Pap smear, low grade squamous intraepithelial lesion (LGSIL) [id number]   • Acute bilateral back pain M54.9   • Adenopathy, cervical R59.0   • Allergic rhinitis J30.9   • Benign neoplasm of adrenal gland D35.00   • Bilateral cataracts H26.9   • Bilateral hearing loss H91.93   • Bilateral impacted cerumen H61.23   • Carpal tunnel syndrome G56.00   • Cervical high risk HPV (human papillomavirus) test positive R87.810   • Diverticulosis of large intestine K57.30   • Encounter for health-related screening Z13.9   • Leiomyoma of uterus, unspecified D25.9   • H/O splenectomy Z90.81   • Malignant neoplasm of kidney (CMS/HCC) C64.9   • Malignant neoplasm of lung (CMS/HCC) C34.90   • Metastatic cancer to cervical lymph nodes (CMS/HCC) C77.0   • Neck mass R22.1   • Plantar fasciitis M72.2   • Pulmonary nodule R91.1   • Restless legs syndrome (RLS) G25.81   • Malignant neoplasm (CMS/HCC) C80.1   • Encounter for care related to Port-a-Cath Z45.2   • History of lung cancer Z85.118   • Metastatic renal cell carcinoma (CMS/HCC) C64.9   • Other nonspecific abnormal finding R68.89     SURGERIES:  Past Surgical History:   Procedure Laterality Date   • ABDOMINAL SURGERY     • APPENDECTOMY     •  SECTION     • CHOLECYSTECTOMY     • HERNIA REPAIR     • LUNG REMOVAL, PARTIAL Right    • NEPHRECTOMY Left      HEALTH MAINTENANCE ITEMS:  Health Maintenance Due   Topic Date Due   • MAMMOGRAM  1950   • ZOSTER VACCINE (1 of 2) 1999   • HEPATITIS C SCREENING  2020   • MEDICARE ANNUAL WELLNESS  2020   • COLONOSCOPY  2020       <no  information>  Last Completed Colonoscopy       Status Date      COLONOSCOPY No completions recorded        Immunization History   Administered Date(s) Administered   • FLUAD TRI 65YR+ 09/14/2019   • Fluzone High Dose =>65 Years ([employer] ONLY) 09/14/2019   • H1N1 Inj 11/20/2009   • Influenza, Unspecified 10/09/2012, 10/08/2013, 10/08/2013, 10/08/2014, 10/12/2015, 10/13/2016   • Pneumococcal Conjugate 13-Valent (PCV13) 09/15/2016   • Pneumococcal Polysaccharide (PPSV23) 05/16/2018   • Tdap 05/16/2018     Last Completed Mammogram       Status Date      MAMMOGRAM No completions recorded            FAMILY HISTORY:  Family History   Problem Relation Age of Onset   • No Known Problems Mother    • No Known Problems Father    • Cancer Sister    • No Known Problems Maternal Grandmother    • No Known Problems Maternal Grandfather    • No Known Problems Paternal Grandmother    • No Known Problems Paternal Grandfather    • No Known Problems Brother    • No Known Problems Maternal Aunt    • No Known Problems Maternal Uncle    • No Known Problems Paternal Aunt    • No Known Problems Paternal Uncle    • No Known Problems Other    • Asthma Neg Hx    • Diabetes Neg Hx    • Emphysema Neg Hx    • Heart failure Neg Hx    • Hypertension Neg Hx      SOCIAL HISTORY:  Social History     Socioeconomic History   • Marital status:      Spouse name: Not on file   • Number of children: Not on file   • Years of education: Not on file   • Highest education level: Not on file   Tobacco Use   • Smoking status: Current Every Day Smoker     Packs/day: 0.75     Years: 55.00     Pack years: 41.25     Types: Cigarettes     Start date: 1966   • Smokeless tobacco: Never Used   Substance and Sexual Activity   • Drug use: Never       REVIEW OF SYSTEMS:  Review of Systems   Constitutional: Positive for fatigue. Negative for activity change, appetite change, chills, diaphoresis, fever, unexpected weight gain and unexpected weight loss.        Manages  "all her ADLs.  Lives alone. Still works part time at [employer]   HENT: Negative.    Eyes: Negative.    Respiratory: Positive for cough (\"smokers\" worse in the morning, productive of white phlegm) and shortness of breath (Admits to MEHTA and some SOB with her routine activities). Negative for chest tightness and wheezing.         Is still smoking 3/4 - 1 ppd - started age 14   Cardiovascular: Negative.    Gastrointestinal: Negative.    Genitourinary: Negative.    Musculoskeletal: Positive for arthralgias (shoulders, knees, back, left hip, hands) and back pain (Left side radicular symptoms sometimes). Negative for joint swelling, myalgias and neck pain.   Skin: Negative.    Allergic/Immunologic: Positive for environmental allergies (\"pollen\").   Neurological: Positive for numbness (left hand, \"carpal tunnel\"). Negative for dizziness.   Hematological: Negative.    Psychiatric/Behavioral: Positive for depressed mood (\"time to time\"). The patient is nervous/anxious.        /68   Pulse 84   Temp 97.1 °F (36.2 °C)   Resp 16   Ht 152.4 cm (60\")   Wt 64.8 kg (142 lb 14.4 oz)   SpO2 97%   Breastfeeding No   BMI 27.91 kg/m²  Body surface area is 1.62 meters squared.  Pain Score    07/16/20 1506   PainSc: 0-No pain       Physical Exam:  Physical Exam   Constitutional: She is oriented to person, place, and time. No distress.   Pleasant, heavy set, modestly kept elderly female.  Ambulatory.  ECOG 1.  Here with her friend, Karyn SANTORO:   Head: Normocephalic and atraumatic.   Wearing a surgical mask    Scarring of the left supraclavicular area from prior neck surgery   Eyes: Pupils are equal, round, and reactive to light. Conjunctivae and EOM are normal. No scleral icterus.   Neck: Normal range of motion. Neck supple. No JVD present. No tracheal deviation present. No thyromegaly present.   Cardiovascular: Normal rate and regular rhythm. Exam reveals no friction rub.   Pulmonary/Chest: No stridor. She has no wheezes. She " has no rales.   Distant breath sounds   Abdominal: Soft. Bowel sounds are normal. She exhibits no distension and no mass. There is no tenderness. There is no rebound and no guarding.   Musculoskeletal: Normal range of motion. She exhibits deformity (Osteoarthritic changes of the hands). She exhibits no edema.   Lymphadenopathy:     She has no cervical adenopathy.   Neurological: She is alert and oriented to person, place, and time. No cranial nerve deficit.   Skin: Skin is warm and dry. No rash noted. No erythema.   Psychiatric: She has a normal mood and affect. Her behavior is normal. Thought content normal.   Vitals reviewed.      Assessment:  1.   Squamous cell lung carcinoma            Tumor stage: TNM IA2 (cT1b,cN0, M0)            Tumor [place]: 1.7 cm nodule in the lingula.  No other PET evidence for metastatic disease.            Complications of tumor: None            Tumor status: Untreated.              - Radiographically progressing (has grown to 1.7cm on chest CT, 06/01/2020 from 1.5 cm on chest CT, 02/19/2020).    2.   History of non-small cell lung cancer in the right upper lobe, status post right upper lobectomy, 2004.  No details  3.   Metastatic urothelial carcinoma with positive supraclavicular lymph node biopsy and left nephrectomy, 2009.  Details unclear but apparently received chemotherapy and radiation.  4.   Tobacco use, 55+ years cigarette smoking      Recommendations:  1.   Apprised of available diagnostic information.  Explained that she appears to have a new primary squamous cell lung carcinoma.  2.   Request pathology at [place] to send out biopsy specimen on 6/10/2020: EGFR, ALK, ROS 1, PDL1, BRAF.  3.   Schedule brain MRI with and without contrast [facility].  4.   Review NCCN guidelines version 6.2020 non-small cell lung cancer-stage Ia (peripheral T1 abc, N0).  If operable: Surgical exploration and resection plus mediastinal lymph node dissection/sampling.  Adjuvant chemotherapy only if  node positive.  If inoperable: Definitive RT including stereotactic ablative radiotherapy (ABR).  5.   Appoint to Dr. Wolfe Re: Assess for possible stereotactic radiation to the solitary lingular lesion.  6.   Appoint to Dr. Healy Re: Assess for possible surgical options for the solitary lingular lesion.  7.   Return to office in 4 to 6 weeks.  Further recommendations pending, but no role for systemic therapy upfront.    I spent 65 total minutes, face-to-face, caring for Jennie today.  Greater than 50% of this time involved counseling and/or coordination of care as documented within this note regarding the patient's illness(es), pros and cons of various treatment options, instructions and/or risk reduction.

## 2020-07-16 ENCOUNTER — LAB (OUTPATIENT)
Dept: LAB | Facility: HOSPITAL | Age: 70
End: 2020-07-16

## 2020-07-16 ENCOUNTER — OFFICE VISIT (OUTPATIENT)
Dept: ONCOLOGY | Facility: CLINIC | Age: 70
End: 2020-07-16

## 2020-07-16 VITALS
WEIGHT: 142.9 LBS | DIASTOLIC BLOOD PRESSURE: 68 MMHG | HEART RATE: 84 BPM | RESPIRATION RATE: 16 BRPM | OXYGEN SATURATION: 97 % | TEMPERATURE: 97.1 F | BODY MASS INDEX: 28.06 KG/M2 | HEIGHT: 60 IN | SYSTOLIC BLOOD PRESSURE: 118 MMHG

## 2020-07-16 DIAGNOSIS — C34.02 MALIGNANT NEOPLASM OF HILUS OF LEFT LUNG (HCC): Primary | ICD-10-CM

## 2020-07-16 PROCEDURE — 99215 OFFICE O/P EST HI 40 MIN: CPT | Performed by: INTERNAL MEDICINE

## 2020-07-16 RX ORDER — NAPROXEN SODIUM 220 MG
220 TABLET ORAL 2 TIMES DAILY PRN
COMMUNITY

## 2020-07-20 ENCOUNTER — HOSPITAL ENCOUNTER (OUTPATIENT)
Dept: MRI IMAGING | Facility: HOSPITAL | Age: 70
Discharge: HOME | End: 2020-07-20
Admitting: INTERNAL MEDICINE

## 2020-07-20 DIAGNOSIS — C34.02 MALIGNANT NEOPLASM OF HILUS OF LEFT LUNG (HCC): ICD-10-CM

## 2020-07-20 LAB — CREAT BLDA-MCNC: 1.1 MG/DL (ref 0.6–1.3)

## 2020-07-20 PROCEDURE — 0 GADOBENATE DIMEGLUMINE 529 MG/ML SOLUTION: Performed by: INTERNAL MEDICINE

## 2020-07-20 PROCEDURE — A9577 INJ MULTIHANCE: HCPCS | Performed by: INTERNAL MEDICINE

## 2020-07-20 PROCEDURE — 70553 MRI BRAIN STEM W/O & W/DYE: CPT

## 2020-07-20 PROCEDURE — 82565 ASSAY OF CREATININE: CPT

## 2020-07-20 RX ADMIN — GADOBENATE DIMEGLUMINE 12 ML: 529 INJECTION, SOLUTION INTRAVENOUS at 09:44

## 2020-07-24 PROBLEM — F17.200 CURRENT EVERY DAY SMOKER: Status: ACTIVE | Noted: 2020-07-24

## 2020-07-27 ENCOUNTER — DOCUMENTATION (OUTPATIENT)
Dept: RADIATION ONCOLOGY | Facility: HOSPITAL | Age: 70
End: 2020-07-27

## 2020-07-27 ENCOUNTER — CONSULT (OUTPATIENT)
Dept: RADIATION ONCOLOGY | Facility: HOSPITAL | Age: 70
End: 2020-07-27

## 2020-07-27 ENCOUNTER — HOSPITAL ENCOUNTER (OUTPATIENT)
Dept: RADIATION ONCOLOGY | Facility: HOSPITAL | Age: 70
Setting detail: RADIATION/ONCOLOGY SERIES
End: 2020-07-27

## 2020-07-27 VITALS
SYSTOLIC BLOOD PRESSURE: 129 MMHG | DIASTOLIC BLOOD PRESSURE: 86 MMHG | BODY MASS INDEX: 28.27 KG/M2 | HEIGHT: 60 IN | WEIGHT: 144 LBS | HEART RATE: 75 BPM

## 2020-07-27 DIAGNOSIS — F17.200 CURRENT EVERY DAY SMOKER: ICD-10-CM

## 2020-07-27 DIAGNOSIS — Z90.2 HISTORY OF LOBECTOMY OF LUNG: ICD-10-CM

## 2020-07-27 DIAGNOSIS — C34.12 MALIGNANT NEOPLASM OF LINGULA OF LEFT LUNG (HCC): Primary | ICD-10-CM

## 2020-07-27 PROCEDURE — G0463 HOSPITAL OUTPT CLINIC VISIT: HCPCS | Performed by: RADIOLOGY

## 2020-07-27 NOTE — PROGRESS NOTES
MYRTLE met with Ms. Molina due to a distress score of six. She is a 69 year old female who came for a radiation consultation today. She moved to [place] in Nov of 2019 from [place]. Her support system is limited she has a couple friends in [place] who can help her and her daughter lives in [place]. Ms. Molina currently works at [employer] but in [place] was an  for a long term care facility. She said at times she will worry about her finances due to retiring from her career last year and adjusting to a new budget. 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 She also said she was nervous about the doctor recommending surgery because she was not sure if she would be able to care for her dog. The doctor answered her questions and she said she should be able to find a friend to help with her dog or a place to board him. MYRTLE informed her about different facilities to board her dog. Mr. Molina said it would be difficult because her dog would suffer from separation anxiety and he has Lyme disease.  No further needs at this time, Ms. Molina will contact this writer if any needs arise in future.

## 2020-07-29 ENCOUNTER — TELEPHONE (OUTPATIENT)
Dept: RADIATION ONCOLOGY | Facility: HOSPITAL | Age: 70
End: 2020-07-29

## 2020-07-30 ENCOUNTER — TELEPHONE (OUTPATIENT)
Dept: RADIATION ONCOLOGY | Facility: HOSPITAL | Age: 70
End: 2020-07-30

## 2020-07-30 NOTE — TELEPHONE ENCOUNTER
Contacted Dr. Hand's office regarding Ms. Molina. I was provided appt in their office for Monday, 8/3/20 @ 2:30 PM. I have requested a CD of 2020 images be sent to their office. I have also faxed medical records ([phone]) to their office for this pt. Contacted the pt and provided her the appt time and address.

## 2020-08-26 ENCOUNTER — DOCUMENTATION (OUTPATIENT)
Dept: RADIATION ONCOLOGY | Facility: HOSPITAL | Age: 70
End: 2020-08-26

## 2020-08-26 NOTE — PROGRESS NOTES
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  She is here alone.    DIAGNOSTIC ABNORMALITIES/PREVIOUS INTERVENTIONS:         1.   History of active tobacco use (55+ years smoking history), urothelial carcinoma with positive supraclavicular lymph node biopsy, and left nephrectomy, 2009.  Had received chemotherapy and radiation.          -    2004 - History of right lung cancer:   · Station 7 node, biopsy:    ? Negative for carcinoma.  · Station 4, biopsy:    ? Negative for carcinoma.  · Lung, right upper lobe, mass, excisional biopsy:   ? Poorly differentiated non-small cell carcinoma (see microscopic description).  · Lymph node, station 7, resection:  ? Thirteen lymph nodes, negative for tumor (0/13).  · Lymph node, station 4R, resection:  ? Eighteen lymph nodes, negative for tumor (0/18).  · Lymph node, station 11R, resection:  ? Four lymph nodes, negative for tumor (0/4).  · Rib, 5th, resection:    ? Negative for carcinoma.  · Lymph node, station 10R, resection:  ? One lymph node, negative for tumor (0/1).  · Right upper lobe mass, excisional biopsy:  ? Poorly differentiated non small cell carcinoma; see Tumor Characteristics and comment.  ? Tumor extends to the inked pleural surface.  · Right upper lobe, lobectomy:  ? Focal hemorrhage and collapse.  ? Negative for carcinoma.                     -    2009 - History of urothelial carcinoma:   10/21/2009:  Left kidney, radial nephrectomy:  · High grade urothelial carcinoma with extensive necrosis.  · Tumor arises in renal pelvis (in situ carcinoma) and invades through  kidney to perinephric fat.  · Lymphovascular invasion: Not identified.  · Margins: Negative.  · One lymph node, negative for malignancy (0/1).  Left hilar lymph node:  · One lymph node, negative for malignancy (0/1).  Spleen:  · Spleen without significant pathologic change           -    11/13/2009:  Left supraclavicular lymph node:  Metastatic high grade carcinoma involving one of four lymph nodes (1/4), see comment.           2.   Surveillance CTs in October 2019 was found to have a new 5 mm nodule in the left lingula.         3.   02/19/2020-CT chest.  Impression: Largest 1.5 cm nodule at the lingula and new 5 mm pulmonary nodule (reported on outside scan not available).  Changes of right upper lobe resection.  Emphysema.  Right hydronephrosis versus extrarenal pelvis (outside imaging report 9/18/2019 indicates extrarenal pelvis but images not available for comparison).  Right adrenal nodule, incompletely characterized.  Prior splenectomy.  Possible left adrenalectomy and nephrectomy.         4.   03/03/2020-PET scan.  Hypermetabolic 1.5 cm pulmonary nodule in the lingula, highly suspicious for metastatic disease or primary lung neoplasm.  No other evidence of metastatic disease or abnormal FDG uptake.  2 cm left adrenal gland nodule, most compatible with adenoma.  6 mm pulmonary nodule at the right lung base in a background of scarring.  Non-FDG avid but too small to accurately characterize by PET scan.         5.   03/27/2020- seen for medical oncology by Dr. Ventura who referred her to Dr. Enriquez of pulmonary in assessment of tissue biopsy.         6.   05/27/2020- seen by Dr. Enriquez of pulmonary in assessment of the left upper lobe nodule.  Review of records from [facility] and [facility] indicate prior history of lung cancer on the right side resected and more recently urothelial cancer of the  system.  All have been treated.  More recent surveillance CT identified  "an asymptomatic lesion in the left upper lobe adjacent to the fissure.  PET scan showed hypermetabolism in this lesion.  Patient was referred to [facility] for possible navigational bronchoscopy/biopsy of the lesion that appears to be in the lingula.  Patient says she has received \"the maximum\" radiation for her lifetime.         7.   06/01/2020-CT chest.  Comparison 02/19/2020, 03/20/2020.  Impression: Slight increase in size of lingular nodule measuring 1.7 cm (previously 1.5 cm) concerning for malignancy.  Decreased size of right lower lobe pulmonary nodule now measures 0.3 cm (from 0.6 cm).  Stable postoperative changes in the right hilum.  Left nephrectomy.  Stable right adrenal low-density lesion, favored to represent an adenoma.         8.   06/10/2020- [facility] (Dr. Hare) bronchoscopy with biopsy.  Pathology: LUNG, LINGULA, TRANSBRONCHIAL BIOPSY:   · AT LEAST SQUAMOUS CELL CARCINOMA IN SITU.  · Immunohistochemistry demonstrates the neoplastic cells to be positive for p40 and negative for GATA3 and CK7, consistent with squamous differentiation.  LUNG NODULE, LINGULA, TRANSBRONCHIAL NEEDLE ASPIRATION WITH CELL BLOCK:   · INVOLVED BY CARINOMA, FAVOR SQUAMOUS CELL CARCINOMA (SEE COMMENT).  Comment: Immunohistochemical stains performed with appropriate controls show that the tumor cells are positive for p40 and negative for ELBA-3 and TTF-1. While this immunophenotype and cytomorphology can be seen in both a primary squamous cell carcinoma of the lung and metastatic urothelial carcinoma, the lack of reactivity for ELBA-3 and the presence of carcinoma in situ in the corresponding surgical biopsy [id number] would favor a primary lung carcinoma.            9.   06/10/2020-hemoglobin 12.6, hematocrit 37, MCV 95, platelets 346,000, WBC 10.3.  BMP with calcium of 8.1 otherwise normal with creatinine 0.99.      10.   07/20/2020- MRI brain w and wo contrast: No metastatic disease.  Mild " chronic microvascular changes.      11. 08/25/2020- robotic lingular segmentectomy (Dr. Moshe Hand).  Diagnosis: A.lung, lingular segmentectomy: Squamous cell carcinoma, 2.2 cm in greatest extent; negative for pleural invasion; surgical margins negative for tumor; 1 benign lymph node (see synoptic).  B.lymph node, level 9L excision: 1 benign lymph node.  C.lymph node, level 8L, excision: 2 benign lymph nodes.  D.lymph node, 7L, excision: 1 benign lymph node.  E.lymph node, level 5, excision: 3 benign lymph nodes.  F.lymph node, 12 L, excision: 1 benign lymph node.  G.lymph node, level 11 L, excision: 4 benign lymph nodes.  H.lymph node, 13 L, excision: At least one benign lymph node.  I.bronchial staple line: Unremarkable bronchus; negative for tumor.  J.lung, final parenchymal margin: Unremarkable lung parenchyma.  Synoptic: Lung specimen-procedure: Segmentectomy.  Specimen laterality: Left.  Spread through airspaces (STS): Not identified.  Total tumor size (size of entire tumor): Greatest dimension 2.2 cm.  Size of invasive component: 2.2 cm.  Tumor focality: Single focus.  Visceropleural invasion: Not identified.  Direct invasion of adjacent structures: No adjacent structures present.  Treatment effect: No known presurgical therapy.  Lymphovascular invasion: Not identified.  Margins: Uninvolved by tumor.  Lymph nodes: Number of lymph nodes involved: 0.  Number of lymph nodes examined: 14.  Lev stations examined: 7: Subcarinal, 5: Sub-aortic/aortopulmonary/AP window, 8L: Paraesophageal, 9L, pulmonary ligament, 11 L, interlobar, 12 L, lobar 13 L segmental.  Pathologic stage classification (P TNM, AJCC eighth edition).  Primary tumor (pT): pT1c, regional lymph nodes (pN): pN0.  Likely insufficient material for molecular testing.    LABS    Lab Results - Last 18 Months   Lab Units 03/27/20  0927   HEMOGLOBIN g/dL 13.2   HEMATOCRIT % 38.5   MCV fL 93.7   WBC 10*3/mm3 8.98   RDW % 13.8   MPV fL 9.9   PLATELETS  10*3/mm3 362   IMM GRAN % % 0.2   NEUTROS ABS 10*3/mm3 5.55   LYMPHS ABS 10*3/mm3 2.30   MONOS ABS 10*3/mm3 0.72   EOS ABS 10*3/mm3 0.32   BASOS ABS 10*3/mm3 0.07   IMMATURE GRANS (ABS) 10*3/mm3 0.02   NRBC /100 WBC 0.0       Lab Results - Last 18 Months   Lab Units 07/20/20  0940 03/27/20  0927   GLUCOSE mg/dL  --  133*   SODIUM mmol/L  --  143   POTASSIUM mmol/L  --  4.7   CO2 mmol/L  --  26.0   CHLORIDE mmol/L  --  97*   ANION GAP mmol/L  --  20.0*   CREATININE mg/dL 1.10 1.07*   BUN mg/dL  --  24*   BUN / CREAT RATIO   --  22.4   CALCIUM mg/dL  --  9.2   EGFR IF NONAFRICN AM mL/min/1.73  --  51*   ALK PHOS U/L  --  71   TOTAL PROTEIN g/dL  --  6.7   ALT (SGPT) U/L  --  15   AST (SGOT) U/L  --  21   BILIRUBIN mg/dL  --  0.3   ALBUMIN g/dL  --  4.30   GLOBULIN gm/dL  --  2.4         PAST MEDICAL HISTORY:  ALLERGIES:  Allergies   Allergen Reactions   • Iodides Hives and Itching     HIVES AND ITCHING POST CT STUDY ON 9/4/15     2-24-16 WITH PREMEDICATION PT STILL HAD A REACTION WITH HIVES AND THROAT TIGHTENING   HIVES AND ITCHING POST CT STUDY ON 9/4/15     2-24-16 WITH PREMEDICATION PT STILL HAD A REACTION WITH HIVES AND THROAT TIGHTENING      • Iodinated Diagnostic Agents Hives     CURRENT MEDICATIONS:  Outpatient Encounter Medications as of 8/31/2020   Medication Sig Dispense Refill   • ALPRAZolam (XANAX) 0.25 MG tablet Take 0.25 mg by mouth As Needed.     • gabapentin (NEURONTIN) 100 MG capsule Take 100 mg by mouth 3 (Three) Times a Day.     • HYDROcodone-acetaminophen (NORCO) 5-325 MG per tablet Take 1 tablet by mouth Every 6 (Six) Hours As Needed.     • melatonin 3 MG tablet Take 3 mg by mouth Every Night.     • naproxen sodium (ALEVE) 220 MG tablet Take 220 mg by mouth 2 (Two) Times a Day As Needed.     • SUPER B COMPLEX/C PO Take  by mouth Daily.       No facility-administered encounter medications on file as of 8/31/2020.        Adult illnesses:  Left urothelial carcinoma, metastatic with positive  supraclavicular lymph node biopsy, 2009.  Lung cancer  Hives after CT contrast  Active tobacco use (55+ years)    Past surgeries:  Port-A-Cath placement, 3/27/2020   section  Left nephrectomy/adrenalectomy  Right upper lobe lung resection,   Appendectomy  Cholecystectomy  Splenectomy  Hernia repair  Cataracts removed with lenses  Blepharoplasty, [facility]U  06/10/2020- transbronchial needle aspiration of lingular lung nodule ([place]).  Carcinoma, favor squamous cell carcinoma.    ADULT ILLNESSES:  Patient Active Problem List   Diagnosis Code   • Abnormal Pap smear, low grade squamous intraepithelial lesion (LGSIL) [id number]   • Acute bilateral back pain M54.9   • Adenopathy, cervical R59.0   • Allergic rhinitis J30.9   • Benign neoplasm of adrenal gland D35.00   • Bilateral cataracts H26.9   • Bilateral hearing loss H91.93   • Bilateral impacted cerumen H61.23   • Carpal tunnel syndrome G56.00   • Cervical high risk HPV (human papillomavirus) test positive R87.810   • Diverticulosis of large intestine K57.30   • Encounter for health-related screening Z13.9   • Leiomyoma of uterus, unspecified D25.9   • H/O splenectomy Z90.81   • Malignant neoplasm of kidney (CMS/HCC) C64.9   • Malignant neoplasm of lung (CMS/HCC) C34.90   • Metastatic cancer to cervical lymph nodes (CMS/HCC) C77.0   • Neck mass R22.1   • Plantar fasciitis M72.2   • Pulmonary nodule R91.1   • Restless legs syndrome (RLS) G25.81   • Malignant neoplasm (CMS/HCC) C80.1   • Encounter for care related to Port-a-Cath Z45.2   • History of lung cancer Z85.118   • Metastatic renal cell carcinoma (CMS/HCC) C64.9   • Other nonspecific abnormal finding R68.89   • Current every day smoker F17.200   • History of lobectomy of lung Z90.2     SURGERIES:  Past Surgical History:   Procedure Laterality Date   • ABDOMINAL SURGERY     • APPENDECTOMY     •  SECTION     • CHOLECYSTECTOMY     • HERNIA REPAIR     • LUNG REMOVAL, PARTIAL Right    •  NEPHRECTOMY Left      HEALTH MAINTENANCE ITEMS:  Health Maintenance Due   Topic Date Due   • ZOSTER VACCINE (1 of 2) 11/17/1999   • HEPATITIS C SCREENING  03/27/2020   • MEDICARE ANNUAL WELLNESS  03/27/2020   • COLONOSCOPY  03/27/2020   • INFLUENZA VACCINE  08/01/2020       <no information>  Last Completed Colonoscopy       Status Date      COLONOSCOPY No completions recorded        Immunization History   Administered Date(s) Administered   • FLUAD TRI 65YR+ 09/14/2019   • Fluzone High Dose =>65 Years ([employer] ONLY) 09/14/2019   • H1N1 Inj 11/20/2009   • Influenza, Unspecified 10/09/2012, 10/08/2013, 10/08/2013, 10/08/2014, 10/12/2015, 10/13/2016   • Pneumococcal Conjugate 13-Valent (PCV13) 09/15/2016   • Pneumococcal Polysaccharide (PPSV23) 05/16/2018   • Tdap 05/16/2018     Last Completed Mammogram       Status Date      MAMMOGRAM Done 9/7/2018 Ext Proc: [facility] SCREENING DIGITAL BREAST TOMOSYNTHESIS BI            FAMILY HISTORY:  Family History   Problem Relation Age of Onset   • No Known Problems Mother    • No Known Problems Father    • Cancer Sister    • No Known Problems Maternal Grandmother    • No Known Problems Maternal Grandfather    • No Known Problems Paternal Grandmother    • No Known Problems Paternal Grandfather    • No Known Problems Brother    • No Known Problems Maternal Aunt    • No Known Problems Maternal Uncle    • No Known Problems Paternal Aunt    • No Known Problems Paternal Uncle    • No Known Problems Other    • Asthma Neg Hx    • Diabetes Neg Hx    • Emphysema Neg Hx    • Heart failure Neg Hx    • Hypertension Neg Hx      SOCIAL HISTORY:  Social History     Socioeconomic History   • Marital status:      Spouse name: Not on file   • Number of children: Not on file   • Years of education: Not on file   • Highest education level: Not on file   Occupational History   • Occupation:  at Long term care facility   Tobacco Use   • Smoking status: Current Every Day Smoker      "Packs/day: 0.75     Years: 55.00     Pack years: 41.25     Types: Cigarettes     Start date: 1966   • Smokeless tobacco: Never Used   • Tobacco comment: half a pack now   Substance and Sexual Activity   • Alcohol use: Not Currently     Frequency: Never   • Drug use: Never   • Sexual activity: Defer       REVIEW OF SYSTEMS:  Review of Systems   Constitutional: Positive for fatigue. Negative for activity change, appetite change, chills, diaphoresis, fever, unexpected weight gain and unexpected weight loss.        Manages all her ADLs, including chores, and driving.  Lives alone. Still works part time at [employer].  Spends \"all the time\" up and about.   HENT: Negative.    Eyes: Negative.    Respiratory: Positive for cough (productive of clear/cream colored/occasional blood tinged) and shortness of breath (Admits to MEHTA and some SOB with her routine activities.  Is using the sphygmomanometer). Negative for chest tightness and wheezing.         Has not smoked (3/4 - 1 ppd) - since before surgery   Cardiovascular: Negative.    Gastrointestinal: Negative.    Genitourinary: Negative.    Musculoskeletal: Positive for arthralgias (shoulders, knees, back, left hip, hands) and back pain (Left side radicular symptoms sometimes). Negative for joint swelling, myalgias and neck pain.   Skin: Negative.    Allergic/Immunologic: Positive for environmental allergies (\"pollen\").   Neurological: Positive for numbness (left hand, \"carpal tunnel\"). Negative for dizziness.   Hematological: Negative.    Psychiatric/Behavioral: Positive for depressed mood (\"time to time\"). The patient is nervous/anxious.        /70   Pulse 65   Temp 97.7 °F (36.5 °C)   Resp 16   Ht 152.4 cm (60\")   Wt 66.7 kg (147 lb 1.6 oz)   SpO2 92%   Breastfeeding No   BMI 28.73 kg/m²  Body surface area is 1.64 meters squared.  Pain Score    08/31/20 1421   PainSc:   6       Physical Exam:  Physical Exam   Constitutional: She is oriented to person, place, and " time. No distress.   Pleasant, heavy set, modestly kept elderly female.  Ambulatory.  ECOG 1.      She has gained 5 pounds since her last visit.   HENT:   Head: Normocephalic and atraumatic.   Wearing a surgical mask today    Scarring of the left supraclavicular area from prior neck surgery   Eyes: Pupils are equal, round, and reactive to light. Conjunctivae and EOM are normal. No scleral icterus.   Neck: Normal range of motion. Neck supple. No JVD present. No tracheal deviation present. No thyromegaly present.   Cardiovascular: Normal rate and regular rhythm. Exam reveals no friction rub.   Pulmonary/Chest: No stridor. She has no wheezes. She has no rales.   Distant breath sounds    Healing thoracotomy incisions   Abdominal: Soft. Bowel sounds are normal. She exhibits no distension and no mass. There is no tenderness. There is no rebound and no guarding.   Musculoskeletal: Normal range of motion. She exhibits deformity (Osteoarthritic changes of the hands). She exhibits no edema.   Lymphadenopathy:     She has no cervical adenopathy.   Neurological: She is alert and oriented to person, place, and time. No cranial nerve deficit.   Skin: Skin is warm and dry. No rash noted. No erythema.   Psychiatric: She has a normal mood and affect. Her behavior is normal. Thought content normal.   Vitals reviewed.      Assessment:  1.   Squamous cell lung carcinoma            Tumor stage: pTNM IA3 (pT1c,pN0, M0)            PET Tumor Rowe: 1.7 cm nodule in the lingula.  No other PET evidence for metastatic disease.            Complications of tumor: None            Tumor status: Untreated.              - Radiographically progressing (has grown to 1.7cm on chest CT, 06/01/2020 from 1.5 cm on chest CT, 02/19/2020).            -08/25/2020-robotic lingular segmentectomy (Dr. Moshe Hand).  Diagnosis: Squamous cell carcinoma, 2.2 cm in greatest extent; negative pleural invasion; surgical margins negative for tumor; 1 benign lymph  node (1/14). pTNM: pT1c, pN0    2.   History of non-small cell lung cancer in the right upper lobe, status post right upper lobectomy, 2004.  No details  3.   Metastatic urothelial carcinoma with positive supraclavicular lymph node biopsy and left nephrectomy, 2009.  Details unclear but apparently received chemotherapy and radiation.  4.   Tobacco use, 55+ years cigarette smoking      Recommendations:  1.   Apprised of available diagnostic information.  Explained that she has a pathologic stage IA squamous cell lung carcinoma.  2.   Request pathology at [place] to send out biopsy specimen on 6/10/2020: EGFR, ALK, ROS 1, PDL1, BRAF-second request.  3.   Review brain MRI (above).  No mets.  4.   Review NCCN guidelines version 6.2020 non-small cell lung cancer-stage IA (peripheral T1 abc, N0).  If operable: Surgical exploration and resection plus mediastinal lymph node dissection/sampling.  Adjuvant chemotherapy only if node positive.  If margins negative (R0)-observation.  Surveillance- H&P and chest CT +/- contrast every 6 months for 2 to 3 years then H&P and low-dose noncontrast enhanced chest CT annually..  5.   Review office encounter, 07/27/2020 from Dr. Wolfe.  We discussed the risks, benefits and options for SRS to the lesion vs surgical resection. Plan for referral to Dr Hand in [place], [place]- cardiothoracic surgeon for evaluation. Will send records, they will call her for an appt.   6.   Schedule CT chest with IV contrast in 19 weeks at [facility].  Compare to prior studies.    7.   Return to office in 20 weeks with pre-office CMP, CBC with differential, and CEA.    I spent 57 total minutes, face-to-face, caring for Jennie today.  Greater than 50% of this time involved counseling and/or coordination of care as documented within this note regarding the patient's illness(es), pros and cons of various treatment options, instructions and/or risk reduction.

## 2020-08-26 NOTE — PROGRESS NOTES
I received a text from Dr. Moshe Hand on this patient yesterday.  He reports that he is performed a robotic lingular segmentectomy that went well on this patient.    I do not have pathology yet on this surgical procedure.    Further recommendations will be based upon pathology.

## 2020-08-31 ENCOUNTER — OFFICE VISIT (OUTPATIENT)
Dept: ONCOLOGY | Facility: CLINIC | Age: 70
End: 2020-08-31

## 2020-08-31 VITALS
RESPIRATION RATE: 16 BRPM | WEIGHT: 147.1 LBS | SYSTOLIC BLOOD PRESSURE: 130 MMHG | DIASTOLIC BLOOD PRESSURE: 70 MMHG | BODY MASS INDEX: 28.88 KG/M2 | TEMPERATURE: 97.7 F | OXYGEN SATURATION: 92 % | HEIGHT: 60 IN | HEART RATE: 65 BPM

## 2020-08-31 DIAGNOSIS — C34.92 MALIGNANT NEOPLASM OF LEFT LUNG, UNSPECIFIED PART OF LUNG (HCC): Primary | ICD-10-CM

## 2020-08-31 PROCEDURE — 99215 OFFICE O/P EST HI 40 MIN: CPT | Performed by: INTERNAL MEDICINE

## 2020-08-31 RX ORDER — HYDROCODONE BITARTRATE AND ACETAMINOPHEN 5; 325 MG/1; MG/1
1 TABLET ORAL EVERY 6 HOURS PRN
COMMUNITY
End: 2021-01-26

## 2020-08-31 RX ORDER — GABAPENTIN 100 MG/1
100 CAPSULE ORAL 3 TIMES DAILY
COMMUNITY
End: 2022-09-08

## 2020-09-08 ENCOUNTER — TELEPHONE (OUTPATIENT)
Dept: ONCOLOGY | Facility: CLINIC | Age: 70
End: 2020-09-08

## 2020-09-08 DIAGNOSIS — C34.92 MALIGNANT NEOPLASM OF LEFT LUNG, UNSPECIFIED PART OF LUNG (HCC): Primary | ICD-10-CM

## 2020-09-08 RX ORDER — HYDROCODONE BITARTRATE AND ACETAMINOPHEN 5; 325 MG/1; MG/1
1 TABLET ORAL 2 TIMES DAILY
Qty: 20 TABLET | Refills: 0 | Status: SHIPPED | OUTPATIENT
Start: 2020-09-08 | End: 2022-09-08

## 2020-09-23 ENCOUNTER — TELEPHONE (OUTPATIENT)
Dept: CARDIAC SURGERY | Facility: CLINIC | Age: 70
End: 2020-09-23

## 2020-09-23 NOTE — TELEPHONE ENCOUNTER
Called patient regarding new patient appointment on 10/7. Looks like patient went to [place] for her care. Just calling to verify that the appointment is no longer needed. Patient did not answer, left message to call office.

## 2020-09-24 NOTE — TELEPHONE ENCOUNTER
Had not heard back from pt so called again today re: this.  Spoke with pt and pt states she did have her surgery done in [place] so appt in our office no longer needed.  Appt cx'd as directed/jennifer

## 2020-10-12 RX ORDER — ALPRAZOLAM 0.25 MG/1
0.25 TABLET ORAL AS NEEDED
Qty: 30 TABLET | Refills: 0 | Status: CANCELLED | OUTPATIENT
Start: 2020-10-12

## 2020-10-13 NOTE — TELEPHONE ENCOUNTER
Dr. Grider messaged back that the patient would need to see her PCP. I called and the patient did not have one. She will be calling to set up with a PCP and she will call me back if she needs any help.

## 2021-01-19 ENCOUNTER — LAB (OUTPATIENT)
Dept: LAB | Facility: HOSPITAL | Age: 71
End: 2021-01-19

## 2021-01-19 ENCOUNTER — HOSPITAL ENCOUNTER (OUTPATIENT)
Dept: CT IMAGING | Facility: HOSPITAL | Age: 71
Discharge: HOME | End: 2021-01-19
Admitting: INTERNAL MEDICINE

## 2021-01-19 DIAGNOSIS — C34.92 MALIGNANT NEOPLASM OF LEFT LUNG, UNSPECIFIED PART OF LUNG (HCC): ICD-10-CM

## 2021-01-19 LAB
ALBUMIN SERPL-MCNC: 4.3 G/DL (ref 3.5–5.2)
ALBUMIN/GLOB SERPL: 1.5 G/DL
ALP SERPL-CCNC: 83 U/L (ref 39–117)
ALT SERPL W P-5'-P-CCNC: 14 U/L (ref 1–33)
ANION GAP SERPL CALCULATED.3IONS-SCNC: 10 MMOL/L (ref 5–15)
AST SERPL-CCNC: 16 U/L (ref 1–32)
BASOPHILS # BLD AUTO: 0.07 10*3/MM3 (ref 0–0.2)
BASOPHILS NFR BLD AUTO: 0.7 % (ref 0–1.5)
BILIRUB SERPL-MCNC: 0.2 MG/DL (ref 0–1.2)
BUN SERPL-MCNC: 23 MG/DL (ref 8–23)
BUN/CREAT SERPL: 23.7 (ref 7–25)
CALCIUM SPEC-SCNC: 9.4 MG/DL (ref 8.6–10.5)
CEA SERPL-MCNC: 8.54 NG/ML
CHLORIDE SERPL-SCNC: 102 MMOL/L (ref 98–107)
CO2 SERPL-SCNC: 27 MMOL/L (ref 22–29)
CREAT SERPL-MCNC: 0.97 MG/DL (ref 0.57–1)
DEPRECATED RDW RBC AUTO: 46.4 FL (ref 37–54)
EOSINOPHIL # BLD AUTO: 0.27 10*3/MM3 (ref 0–0.4)
EOSINOPHIL NFR BLD AUTO: 2.7 % (ref 0.3–6.2)
ERYTHROCYTE [DISTWIDTH] IN BLOOD BY AUTOMATED COUNT: 13.7 % (ref 12.3–15.4)
GFR SERPL CREATININE-BSD FRML MDRD: 57 ML/MIN/1.73
GLOBULIN UR ELPH-MCNC: 2.8 GM/DL
GLUCOSE SERPL-MCNC: 107 MG/DL (ref 65–99)
HCT VFR BLD AUTO: 39.3 % (ref 34–46.6)
HGB BLD-MCNC: 13.7 G/DL (ref 12–15.9)
IMM GRANULOCYTES # BLD AUTO: 0.03 10*3/MM3 (ref 0–0.05)
IMM GRANULOCYTES NFR BLD AUTO: 0.3 % (ref 0–0.5)
LYMPHOCYTES # BLD AUTO: 3.42 10*3/MM3 (ref 0.7–3.1)
LYMPHOCYTES NFR BLD AUTO: 33.8 % (ref 19.6–45.3)
MCH RBC QN AUTO: 32.3 PG (ref 26.6–33)
MCHC RBC AUTO-ENTMCNC: 34.9 G/DL (ref 31.5–35.7)
MCV RBC AUTO: 92.7 FL (ref 79–97)
MONOCYTES # BLD AUTO: 0.66 10*3/MM3 (ref 0.1–0.9)
MONOCYTES NFR BLD AUTO: 6.5 % (ref 5–12)
NEUTROPHILS NFR BLD AUTO: 5.66 10*3/MM3 (ref 1.7–7)
NEUTROPHILS NFR BLD AUTO: 56 % (ref 42.7–76)
NRBC BLD AUTO-RTO: 0 /100 WBC (ref 0–0.2)
PLATELET # BLD AUTO: 393 10*3/MM3 (ref 140–450)
PMV BLD AUTO: 10.3 FL (ref 6–12)
POTASSIUM SERPL-SCNC: 4.8 MMOL/L (ref 3.5–5.2)
PROT SERPL-MCNC: 7.1 G/DL (ref 6–8.5)
RBC # BLD AUTO: 4.24 10*6/MM3 (ref 3.77–5.28)
SODIUM SERPL-SCNC: 139 MMOL/L (ref 136–145)
WBC # BLD AUTO: 10.11 10*3/MM3 (ref 3.4–10.8)

## 2021-01-19 PROCEDURE — 80053 COMPREHEN METABOLIC PANEL: CPT

## 2021-01-19 PROCEDURE — 82378 CARCINOEMBRYONIC ANTIGEN: CPT

## 2021-01-19 PROCEDURE — 71250 CT THORAX DX C-: CPT

## 2021-01-19 PROCEDURE — 36415 COLL VENOUS BLD VENIPUNCTURE: CPT

## 2021-01-19 PROCEDURE — 85025 COMPLETE CBC W/AUTO DIFF WBC: CPT

## 2021-01-23 NOTE — PROGRESS NOTES
[facility] ONC [facility] HEMATOLOGY AND ONCOLOGY  [address]  [place] [address] [phone]    Patient Name: Jennie Molina  Encounter Date: 01/26/2021  YOB: 1950  Patient Number: [id number]      REASON FOR VISIT: Jennie Molina is a 70-year-old female who returns in follow-up of 2 lung cancers: Squamous cell carcinoma in the right upper lobe, status post partial resection, 2004. On 08/25/2020-underwent robotic lingular segmentectomy for squamous cell carcinoma, 2.2 cm in greatest extent; negative pleural invasion; surgical margins negative for tumor; 1 benign lymph node (1/14). pTNM: pT1c, pN0.  She is here alone.    DIAGNOSTIC ABNORMALITIES/PREVIOUS INTERVENTIONS:         1.   History of active tobacco use (55+ years smoking history), urothelial carcinoma with positive supraclavicular lymph node biopsy, and left nephrectomy, 2009.  Had received chemotherapy and radiation.          -    2004 - History of right lung cancer:   · Station 7 node, biopsy:    ? Negative for carcinoma.  · Station 4, biopsy:    ? Negative for carcinoma.  · Lung, right upper lobe, mass, excisional biopsy:   ? Poorly differentiated non-small cell carcinoma (see microscopic description).  · Lymph node, station 7, resection:  ? Thirteen lymph nodes, negative for tumor (0/13).  · Lymph node, station 4R, resection:  ? Eighteen lymph nodes, negative for tumor (0/18).  · Lymph node, station 11R, resection:  ? Four lymph nodes, negative for tumor (0/4).  · Rib, 5th, resection:    ? Negative for carcinoma.  · Lymph node, station 10R, resection:  ? One lymph node, negative for tumor (0/1).  · Right upper lobe mass, excisional biopsy:  ? Poorly differentiated non small cell carcinoma; see Tumor Characteristics and comment.  ? Tumor extends to the inked pleural surface.  · Right upper lobe, lobectomy:  ? Focal hemorrhage and collapse.  ? Negative for carcinoma.                      -    2009 - History of urothelial carcinoma:   10/21/2009:  Left kidney, radial nephrectomy:  · High grade urothelial carcinoma with extensive necrosis.  · Tumor arises in renal pelvis (in situ carcinoma) and invades through kidney to perinephric fat.  · Lymphovascular invasion: Not identified.  · Margins: Negative.  · One lymph node, negative for malignancy (0/1).  Left hilar lymph node:  · One lymph node, negative for malignancy (0/1).  Spleen:  · Spleen without significant pathologic change           -    11/13/2009:  Left supraclavicular lymph node:  Metastatic high grade carcinoma involving one of four lymph nodes (1/4), see comment.           2.   Surveillance CTs in October 2019 was found to have a new 5 mm nodule in the left lingula.         3.   02/19/2020-CT chest.  Impression: Largest 1.5 cm nodule at the lingula and new 5 mm pulmonary nodule (reported on outside scan not available).  Changes of right upper lobe resection.  Emphysema.  Right hydronephrosis versus extrarenal pelvis (outside imaging report 9/18/2019 indicates extrarenal pelvis but images not available for comparison).  Right adrenal nodule, incompletely characterized.  Prior splenectomy.  Possible left adrenalectomy and nephrectomy.         4.   03/03/2020-PET scan.  Hypermetabolic 1.5 cm pulmonary nodule in the lingula, highly suspicious for metastatic disease or primary lung neoplasm.  No other evidence of metastatic disease or abnormal FDG uptake.  2 cm left adrenal gland nodule, most compatible with adenoma.  6 mm pulmonary nodule at the right lung base in a background of scarring.  Non-FDG avid but too small to accurately characterize by PET scan.         5.   03/27/2020- seen for medical oncology by Dr. Ventura who referred her to Dr. Enriquez of pulmonary in assessment of tissue biopsy.         6.   05/27/2020- seen by Dr. Enriquez of pulmonary in assessment of the left upper lobe nodule.  Review of records from [facility]  "in [place] and [facility] indicate prior history of lung cancer on the right side resected and more recently urothelial cancer of the  system.  All have been treated.  More recent surveillance CT identified an asymptomatic lesion in the left upper lobe adjacent to the fissure.  PET scan showed hypermetabolism in this lesion.  Patient was referred to [facility] for possible navigational bronchoscopy/biopsy of the lesion that appears to be in the lingula.  Patient says she has received \"the maximum\" radiation for her lifetime.         7.   06/01/2020-CT chest.  Comparison 02/19/2020, 03/20/2020.  Impression: Slight increase in size of lingular nodule measuring 1.7 cm (previously 1.5 cm) concerning for malignancy.  Decreased size of right lower lobe pulmonary nodule now measures 0.3 cm (from 0.6 cm).  Stable postoperative changes in the right hilum.  Left nephrectomy.  Stable right adrenal low-density lesion, favored to represent an adenoma.         8.   06/10/2020- [facility] (Dr. Hare) bronchoscopy with biopsy.  Pathology: LUNG, LINGULA, TRANSBRONCHIAL BIOPSY:   · AT LEAST SQUAMOUS CELL CARCINOMA IN SITU.  · Immunohistochemistry demonstrates the neoplastic cells to be positive for p40 and negative for GATA3 and CK7, consistent with squamous differentiation.  LUNG NODULE, LINGULA, TRANSBRONCHIAL NEEDLE ASPIRATION WITH CELL BLOCK:   · INVOLVED BY CARINOMA, FAVOR SQUAMOUS CELL CARCINOMA (SEE COMMENT).  Comment: Immunohistochemical stains performed with appropriate controls show that the tumor cells are positive for p40 and negative for ELBA-3 and TTF-1. While this immunophenotype and cytomorphology can be seen in both a primary squamous cell carcinoma of the lung and metastatic urothelial carcinoma, the lack of reactivity for ELBA-3 and the presence of carcinoma in situ in the corresponding surgical biopsy [id number] would favor a primary lung carcinoma.            9.   " 06/10/2020-hemoglobin 12.6, hematocrit 37, MCV 95, platelets 346,000, WBC 10.3.  BMP with calcium of 8.1 otherwise normal with creatinine 0.99.      10.   07/20/2020- MRI brain w and wo contrast: No metastatic disease.  Mild chronic microvascular changes.      11. 08/25/2020- robotic lingular segmentectomy (Dr. Moshe Hand).  Diagnosis: A.lung, lingular segmentectomy: Squamous cell carcinoma, 2.2 cm in greatest extent; negative for pleural invasion; surgical margins negative for tumor; 1 benign lymph node (see synoptic).  B.lymph node, level 9L excision: 1 benign lymph node.  C.lymph node, level 8L, excision: 2 benign lymph nodes.  D.lymph node, 7L, excision: 1 benign lymph node.  E.lymph node, level 5, excision: 3 benign lymph nodes.  F.lymph node, 12 L, excision: 1 benign lymph node.  G.lymph node, level 11 L, excision: 4 benign lymph nodes.  H.lymph node, 13 L, excision: At least one benign lymph node.  I.bronchial staple line: Unremarkable bronchus; negative for tumor.  J.lung, final parenchymal margin: Unremarkable lung parenchyma.  Synoptic: Lung specimen-procedure: Segmentectomy.  Specimen laterality: Left.  Spread through airspaces (STS): Not identified.  Total tumor size (size of entire tumor): Greatest dimension 2.2 cm.  Size of invasive component: 2.2 cm.  Tumor focality: Single focus.  Visceropleural invasion: Not identified.  Direct invasion of adjacent structures: No adjacent structures present.  Treatment effect: No known presurgical therapy.  Lymphovascular invasion: Not identified.  Margins: Uninvolved by tumor.  Lymph nodes: Number of lymph nodes involved: 0.  Number of lymph nodes examined: 14.  Lev stations examined: 7: Subcarinal, 5: Sub-aortic/aortopulmonary/AP window, 8L: Paraesophageal, 9L, pulmonary ligament, 11 L, interlobar, 12 L, lobar 13 L segmental.  Pathologic stage classification (P TNM, AJCC eighth edition).  Primary tumor (pT): pT1c, regional lymph nodes (pN): pN0.  Likely  insufficient material for molecular testing.    LABS    Lab Results - Last 18 Months   Lab Units 01/19/21  1332 03/27/20  0927   HEMOGLOBIN g/dL 13.7 13.2   HEMATOCRIT % 39.3 38.5   MCV fL 92.7 93.7   WBC 10*3/mm3 10.11 8.98   RDW % 13.7 13.8   MPV fL 10.3 9.9   PLATELETS 10*3/mm3 393 362   IMM GRAN % % 0.3 0.2   NEUTROS ABS 10*3/mm3 5.66 5.55   LYMPHS ABS 10*3/mm3 3.42* 2.30   MONOS ABS 10*3/mm3 0.66 0.72   EOS ABS 10*3/mm3 0.27 0.32   BASOS ABS 10*3/mm3 0.07 0.07   IMMATURE GRANS (ABS) 10*3/mm3 0.03 0.02   NRBC /100 WBC 0.0 0.0       Lab Results - Last 18 Months   Lab Units 01/19/21  1332 07/20/20  0940 03/27/20  0927   GLUCOSE mg/dL 107*  --  133*   SODIUM mmol/L 139  --  143   POTASSIUM mmol/L 4.8  --  4.7   CO2 mmol/L 27.0  --  26.0   CHLORIDE mmol/L 102  --  97*   ANION GAP mmol/L 10.0  --  20.0*   CREATININE mg/dL 0.97 1.10 1.07*   BUN mg/dL 23  --  24*   BUN / CREAT RATIO  23.7  --  22.4   CALCIUM mg/dL 9.4  --  9.2   EGFR IF NONAFRICN AM mL/min/1.73 57*  --  51*   ALK PHOS U/L 83  --  71   TOTAL PROTEIN g/dL 7.1  --  6.7   ALT (SGPT) U/L 14  --  15   AST (SGOT) U/L 16  --  21   BILIRUBIN mg/dL 0.2  --  0.3   ALBUMIN g/dL 4.30  --  4.30   GLOBULIN gm/dL 2.8  --  2.4         PAST MEDICAL HISTORY:  ALLERGIES:  Allergies   Allergen Reactions   • Iodides Hives and Itching     HIVES AND ITCHING POST CT STUDY ON 9/4/15     2-24-16 WITH PREMEDICATION PT STILL HAD A REACTION WITH HIVES AND THROAT TIGHTENING   HIVES AND ITCHING POST CT STUDY ON 9/4/15     2-24-16 WITH PREMEDICATION PT STILL HAD A REACTION WITH HIVES AND THROAT TIGHTENING      • Iodinated Diagnostic Agents Hives     CURRENT MEDICATIONS:  Outpatient Encounter Medications as of 1/26/2021   Medication Sig Dispense Refill   • ALPRAZolam (XANAX) 0.25 MG tablet Take 0.25 mg by mouth As Needed.     • melatonin 3 MG tablet Take 3 mg by mouth Every Night.     • naproxen sodium (ALEVE) 220 MG tablet Take 220 mg by mouth 2 (Two) Times a Day As Needed.     •  SUPER B COMPLEX/C PO Take  by mouth Daily.     • gabapentin (NEURONTIN) 100 MG capsule Take 100 mg by mouth 3 (Three) Times a Day.     • HYDROcodone-acetaminophen (NORCO) 5-325 MG per tablet Take 1 tablet by mouth 2 (two) times a day. 20 tablet 0   • [DISCONTINUED] HYDROcodone-acetaminophen (NORCO) 5-325 MG per tablet Take 1 tablet by mouth Every 6 (Six) Hours As Needed.       No facility-administered encounter medications on file as of 2021.        Adult illnesses:  Left urothelial carcinoma, metastatic with positive supraclavicular lymph node biopsy, 2009.  Lung cancer  Hives after CT contrast  Active tobacco use (55+ years)    Past surgeries:  Port-A-Cath placement, 3/27/2020   section  Left nephrectomy/adrenalectomy  Right upper lobe lung resection,   Appendectomy  Cholecystectomy  Splenectomy  Hernia repair  Cataracts removed with lenses  Blepharoplasty, OU  06/10/2020- transbronchial needle aspiration of lingular lung nodule ([facility]).  Carcinoma, favor squamous cell carcinoma.    ADULT ILLNESSES:  Patient Active Problem List   Diagnosis Code   • Abnormal Pap smear, low grade squamous intraepithelial lesion (LGSIL) [id number]   • Acute bilateral back pain M54.9   • Adenopathy, cervical R59.0   • Allergic rhinitis J30.9   • Benign neoplasm of adrenal gland D35.00   • Bilateral cataracts H26.9   • Bilateral hearing loss H91.93   • Bilateral impacted cerumen H61.23   • Carpal tunnel syndrome G56.00   • Cervical high risk HPV (human papillomavirus) test positive R87.810   • Diverticulosis of large intestine K57.30   • Encounter for health-related screening Z13.9   • Leiomyoma of uterus, unspecified D25.9   • H/O splenectomy Z90.81   • Malignant neoplasm of kidney (CMS/HCC) C64.9   • Malignant neoplasm of lung (CMS/HCC) C34.90   • Metastatic cancer to cervical lymph nodes (CMS/HCC) C77.0   • Neck mass R22.1   • Plantar fasciitis M72.2   • Pulmonary nodule R91.1   • Restless legs syndrome  (RLS) G25.81   • Malignant neoplasm (CMS/HCC) C80.1   • Encounter for care related to Port-a-Cath Z45.2   • History of lung cancer Z85.118   • Metastatic renal cell carcinoma (CMS/HCC) C64.9   • Other nonspecific abnormal finding R68.89   • Current every day smoker F17.200   • History of lobectomy of lung Z90.2     SURGERIES:  Past Surgical History:   Procedure Laterality Date   • ABDOMINAL SURGERY     • APPENDECTOMY     •  SECTION     • CHOLECYSTECTOMY     • HERNIA REPAIR     • LUNG REMOVAL, PARTIAL Right    • NEPHRECTOMY Left      HEALTH MAINTENANCE ITEMS:  Health Maintenance Due   Topic Date Due   • COLONOSCOPY  1950   • ZOSTER VACCINE (1 of 2) 1999   • HEPATITIS C SCREENING  2020   • ANNUAL WELLNESS VISIT  2020   • INFLUENZA VACCINE  2020   • MAMMOGRAM  2020       <no information>  Last Completed Colonoscopy       Status Date      COLONOSCOPY No completions recorded        Immunization History   Administered Date(s) Administered   • FLUAD TRI 65YR+ 2019   • Fluzone High Dose =>65 Years ([employer] ONLY) 2019   • H1N1 Inj 2009   • Influenza, Unspecified 10/09/2012, 10/08/2013, 10/08/2013, 10/08/2014, 10/12/2015, 10/13/2016   • Pneumococcal Conjugate 13-Valent (PCV13) 09/15/2016   • Pneumococcal Polysaccharide (PPSV23) 2018   • Tdap 2018     Last Completed Mammogram       Status Date      MAMMOGRAM Done 2018 Ext Proc: [facility] SCREENING DIGITAL BREAST TOMOSYNTHESIS BI            FAMILY HISTORY:  Family History   Problem Relation Age of Onset   • No Known Problems Mother    • No Known Problems Father    • Cancer Sister    • No Known Problems Maternal Grandmother    • No Known Problems Maternal Grandfather    • No Known Problems Paternal Grandmother    • No Known Problems Paternal Grandfather    • No Known Problems Brother    • No Known Problems Maternal Aunt    • No Known Problems Maternal Uncle    • No Known Problems Paternal Aunt    • No Known  "Problems Paternal Uncle    • No Known Problems Other    • Asthma Neg Hx    • Diabetes Neg Hx    • Emphysema Neg Hx    • Heart failure Neg Hx    • Hypertension Neg Hx      SOCIAL HISTORY:  Social History     Socioeconomic History   • Marital status:      Spouse name: Not on file   • Number of children: Not on file   • Years of education: Not on file   • Highest education level: Not on file   Occupational History   • Occupation:  at Long term care facility   Tobacco Use   • Smoking status: Current Every Day Smoker     Packs/day: 0.75     Years: 55.00     Pack years: 41.25     Types: Cigarettes     Start date: 1966   • Smokeless tobacco: Never Used   • Tobacco comment: half a pack now   Substance and Sexual Activity   • Alcohol use: Not Currently     Frequency: Never   • Drug use: Never   • Sexual activity: Defer       REVIEW OF SYSTEMS:  Review of Systems   Constitutional: Positive for fatigue. Negative for activity change, appetite change, chills, diaphoresis, fever, unexpected weight gain and unexpected weight loss.        Manages all her ADLs, including chores, and driving.  Lives alone. Still works part time at [employer].  Still \"up and about all the time\"   HENT: Negative.    Eyes: Negative.    Respiratory: Positive for cough (productive of clear/cream colored/never blood tinged - phlegm) and shortness of breath (Admits to MEHTA and some SOB with her routine activities. ). Negative for chest tightness and wheezing.         Is still smoking < 1/2 ppd - smoker since age 15   Cardiovascular: Negative.    Gastrointestinal: Negative.    Genitourinary: Negative.    Musculoskeletal: Positive for arthralgias (shoulders, knees, back, left hip, hands) and back pain (Left side radicular symptoms sometimes). Negative for joint swelling, myalgias and neck pain.   Skin: Negative.    Allergic/Immunologic: Positive for environmental allergies (\"pollen\").   Neurological: Positive for numbness (left hand, \"carpal " "tunnel\"). Negative for dizziness.   Hematological: Negative.    Psychiatric/Behavioral: Positive for depressed mood (\"time to time\"). The patient is nervous/anxious.        /72   Pulse 72   Temp 97.6 °F (36.4 °C) (Temporal)   Resp 18   Ht 152.4 cm (60\")   Wt 67.9 kg (149 lb 12.8 oz)   SpO2 95%   BMI 29.26 kg/m²  Body surface area is 1.65 meters squared.  Pain Score    01/26/21 1425   PainSc: 0-No pain       Physical Exam:  Physical Exam   Constitutional: She is oriented to person, place, and time. No distress.   Pleasant, heavy set, modestly kept elderly female.  Ambulatory.  ECOG 1.      She has gained 2 pounds (in addition to 5 pounds at her prior visit) since her last visit.   HENT:   Head: Normocephalic and atraumatic.   Wearing a surgical mask today    Scarring of the left supraclavicular area from prior neck surgery   Eyes: Pupils are equal, round, and reactive to light. Conjunctivae are normal. No scleral icterus.   Neck: Normal range of motion. Neck supple. No JVD present. No tracheal deviation present. No thyromegaly present.   Cardiovascular: Normal rate and regular rhythm. Exam reveals no friction rub.   Pulmonary/Chest: No stridor. She has no wheezes. She has no rales.   Distant breath sounds    Healed thoracotomy incisions   Abdominal: Soft. Bowel sounds are normal. She exhibits no distension and no mass. There is no abdominal tenderness. There is no rebound and no guarding.   Musculoskeletal: Normal range of motion. Deformity (Osteoarthritic changes of the hands) present.   Lymphadenopathy:     She has no cervical adenopathy.   Neurological: She is alert and oriented to person, place, and time. No cranial nerve deficit.   Skin: Skin is warm and dry. No rash noted. No erythema.   Psychiatric: Her behavior is normal. Thought content normal.   Vitals reviewed.      Assessment:  1.   Squamous cell lung carcinoma            Tumor stage: pTNM IA3 (pT1c,pN0, M0)            PET Tumor [place]: 1.7 " cm nodule in the lingula.  No other PET evidence for metastatic disease.            Complications of tumor: None            Tumor status: Untreated.              - Radiographically progressing (has grown to 1.7cm on chest CT, 06/01/2020 from 1.5 cm on chest CT, 02/19/2020).            -08/25/2020-robotic lingular segmentectomy (Dr. Moshe Hand).  Diagnosis: Squamous cell carcinoma, 2.2 cm in greatest extent; negative pleural invasion; surgical margins negative for tumor; 1 benign lymph node (1/14). pTNM: pT1c, pN0            --01/19/2021-CT chest.  Status post bilateral lung surgery with postop changes.  No CT evidence of malignancy or acute cardiopulmonary process.  Mild induration of the left axilla with small lymph nodes, significance uncertain.  Consider adenitis.  Stable low-attenuation right adrenal mass.  Changes from left nephrectomy.    2.   History of non-small cell lung cancer in the right upper lobe, status post right upper lobectomy, 2004.  No details  3.   Metastatic urothelial carcinoma with positive supraclavicular lymph node biopsy and left nephrectomy, 2009.  Details unclear but apparently received chemotherapy and radiation.  4.   Tobacco use, 55+ years cigarette smoking  5.   Chronic kidney disease, stage III.  GFR 57 mL/min, 01/19/2021.  6.   Abnormal CEA, 8.5 on 01/19/2021.  Tobacco smoker?  Needs follow-up.  7.   Anxiety.  Requests alprazolam      Recommendations:  1.   Apprised of labs, 01/19/2021 with GFR 57 otherwise normal CMP, CEA 8.54 (no comparisons), and normal CBC.  2.   Apprised of chest CT, 01/19/2021 (above).  No evidence of malignancy.    3.   Request pathology at [facility] to send out biopsy specimen on 6/10/2020: EGFR, ALK, ROS 1, PDL1, BRAF-third request.  4.   Review NCCN guidelines version 6.2020 non-small cell lung cancer-stage IA (peripheral T1 abc, N0).  If operable: Surgical exploration and resection plus mediastinal lymph node dissection/sampling.  Adjuvant  chemotherapy only if node positive.  If margins negative (R0)-observation.  Surveillance- H&P and chest CT +/- contrast every 6 months for 2 to 3 years then H&P and low-dose noncontrast enhanced chest CT annually..  5.   Schedule CT chest with IV contrast in 23 weeks at [facility].  Compare to prior studies.   6.   Rx:  Xanax 0.25 po hs prn # 10 - No RF   7.   Return to office in 24 weeks with pre-office CMP, CBC with differential, and CEA.    I spent      total minutes, face-to-face, caring for Jennie today.  Greater than 50% of this time involved counseling and/or coordination of care as documented within this note regarding the patient's illness(es), pros and cons of various treatment options, instructions and/or risk reduction.

## 2021-01-26 ENCOUNTER — OFFICE VISIT (OUTPATIENT)
Dept: ONCOLOGY | Facility: CLINIC | Age: 71
End: 2021-01-26

## 2021-01-26 VITALS
HEIGHT: 60 IN | BODY MASS INDEX: 29.41 KG/M2 | TEMPERATURE: 97.6 F | WEIGHT: 149.8 LBS | RESPIRATION RATE: 18 BRPM | OXYGEN SATURATION: 95 % | SYSTOLIC BLOOD PRESSURE: 132 MMHG | HEART RATE: 72 BPM | DIASTOLIC BLOOD PRESSURE: 72 MMHG

## 2021-01-26 DIAGNOSIS — C34.92 MALIGNANT NEOPLASM OF LEFT LUNG, UNSPECIFIED PART OF LUNG (HCC): Primary | ICD-10-CM

## 2021-01-26 PROCEDURE — 99214 OFFICE O/P EST MOD 30 MIN: CPT | Performed by: INTERNAL MEDICINE

## 2021-01-26 RX ORDER — ALPRAZOLAM 0.25 MG/1
0.25 TABLET ORAL NIGHTLY PRN
Qty: 10 TABLET | Refills: 0 | Status: SHIPPED | OUTPATIENT
Start: 2021-01-26 | End: 2021-07-26

## 2021-02-04 ENCOUNTER — TELEPHONE (OUTPATIENT)
Dept: ADMINISTRATIVE | Age: 71
End: 2021-02-04

## 2021-02-04 NOTE — TELEPHONE ENCOUNTER
Pt. Was wanting to est. Care with Dr. Armani Ornelas. I did offer other providers but she feels he would be best for her.

## 2021-02-05 NOTE — TELEPHONE ENCOUNTER
I called pt. To let her know. She's going to look up providers and get back with us on who she'd like to go with.

## 2021-02-09 ENCOUNTER — TELEPHONE (OUTPATIENT)
Dept: ONCOLOGY | Facility: CLINIC | Age: 71
End: 2021-02-09

## 2021-02-09 NOTE — TELEPHONE ENCOUNTER
He has had a cell free foundation 1 (blood)?.  Yes cancel that if it will cost her that much    See if we can request pathology at [place] to send out biopsy specimen on 6/10/2020: EGFR, ALK, ROS 1, PDL1, BRAF-third request

## 2021-02-09 NOTE — TELEPHONE ENCOUNTER
I called and spoke with the patient and let her know what the form is. Jennie asks why the foundation one is necessary. She reports that she does not have any tumor left and she gets routine ct scans.

## 2021-02-09 NOTE — TELEPHONE ENCOUNTER
Provider: CONCE[facility]  Caller: JAKE DE LA TORRE  Relationship to Patient: SELF    Phone Number: [phone]  Reason for Call: PT HAS RECEIVED PAPER WORK IN THE MAIL FROM [employer],   FOR A CDx TM, SHE DOES NOT KNOW WHAT THIS IF FOR, SHE SAYS DR ROMERO HAS ORDERED THIS. WANTS TO KNOW WHAT THIS FORM IS FOR.

## 2021-02-12 ENCOUNTER — TELEPHONE (OUTPATIENT)
Dept: ONCOLOGY | Facility: CLINIC | Age: 71
End: 2021-02-12

## 2021-02-12 NOTE — TELEPHONE ENCOUNTER
MOMO WITH [facility] 1    CALLING IN REGARDS TO PATIENT'S TEST,  IS ON HOLD ABN FORM IS REQUIRED, DUE TO STAGE 1 DISEASE, PLEASE CALL MOMO BACK    CALL BACK # [phone]

## 2021-02-18 ENCOUNTER — TELEPHONE (OUTPATIENT)
Dept: ONCOLOGY | Facility: CLINIC | Age: 71
End: 2021-02-18

## 2021-02-18 NOTE — TELEPHONE ENCOUNTER
Addended by: ARNOLDO GAGNON on: 4/24/2017 02:46 PM     Modules accepted: Orders     Eduin calling from [facility].  He wants to be sure Dr. Grider knows [facility] 1 test is on hold due to the need of ABN form.    Please call him at- [phone]

## 2021-04-07 ENCOUNTER — IMMUNIZATION (OUTPATIENT)
Dept: VACCINE CLINIC | Facility: HOSPITAL | Age: 71
End: 2021-04-07

## 2021-04-07 PROCEDURE — 0011A: CPT | Performed by: OBSTETRICS & GYNECOLOGY

## 2021-04-07 PROCEDURE — 91301 HC SARSCO02 VAC 100MCG/0.5ML IM: CPT | Performed by: OBSTETRICS & GYNECOLOGY

## 2021-05-05 ENCOUNTER — IMMUNIZATION (OUTPATIENT)
Dept: VACCINE CLINIC | Facility: HOSPITAL | Age: 71
End: 2021-05-05

## 2021-05-05 PROCEDURE — 91301 HC SARSCO02 VAC 100MCG/0.5ML IM: CPT | Performed by: OBSTETRICS & GYNECOLOGY

## 2021-05-05 PROCEDURE — 0012A: CPT | Performed by: OBSTETRICS & GYNECOLOGY

## 2021-07-18 NOTE — PROGRESS NOTES
[facility] HEMATOLOGY AND ONCOLOGY  [address]  [place] [address] [phone]    Patient Name: Jennie Molina  Encounter Date: 07/26/2021  YOB: 1950  Patient Number: [id number]      REASON FOR VISIT: Jennie Molina is a 70-year-old female who returns in follow-up of 2 lung cancers: Squamous cell carcinoma in the right upper lobe, status post partial resection, 2004. On 08/25/2020-underwent robotic lingular segmentectomy for squamous cell carcinoma, 2.2 cm in greatest extent; negative pleural invasion; surgical margins negative for tumor; 1 benign lymph node (1/14). pTNM: pT1c, pN0.  She is here alone.    DIAGNOSTIC ABNORMALITIES/PREVIOUS INTERVENTIONS:         1.   History of active tobacco use (55+ years smoking history), urothelial carcinoma with positive supraclavicular lymph node biopsy, and left nephrectomy, 2009.  Had received chemotherapy and radiation.          -    2004 - History of right lung cancer:   · Station 7 node, biopsy:    ? Negative for carcinoma.  · Station 4, biopsy:    ? Negative for carcinoma.  · Lung, right upper lobe, mass, excisional biopsy:   ? Poorly differentiated non-small cell carcinoma (see microscopic description).  · Lymph node, station 7, resection:  ? Thirteen lymph nodes, negative for tumor (0/13).  · Lymph node, station 4R, resection:  ? Eighteen lymph nodes, negative for tumor (0/18).  · Lymph node, station 11R, resection:  ? Four lymph nodes, negative for tumor (0/4).  · Rib, 5th, resection:    ? Negative for carcinoma.  · Lymph node, station 10R, resection:  ? One lymph node, negative for tumor (0/1).  · Right upper lobe mass, excisional biopsy:  ? Poorly differentiated non small cell carcinoma; see Tumor Characteristics and comment.  ? Tumor extends to the inked pleural surface.  · Right upper lobe, lobectomy:  ? Focal hemorrhage and collapse.  ? Negative for carcinoma.                      -    2009 - History of urothelial carcinoma:   10/21/2009:  Left kidney, radial nephrectomy:  · High grade urothelial carcinoma with extensive necrosis.  · Tumor arises in renal pelvis (in situ carcinoma) and invades through kidney to perinephric fat.  · Lymphovascular invasion: Not identified.  · Margins: Negative.  · One lymph node, negative for malignancy (0/1).  Left hilar lymph node:  · One lymph node, negative for malignancy (0/1).  Spleen:  · Spleen without significant pathologic change           -    11/13/2009:  Left supraclavicular lymph node:  Metastatic high grade carcinoma involving one of four lymph nodes (1/4), see comment.           2.   Surveillance CTs in October 2019 was found to have a new 5 mm nodule in the left lingula.         3.   02/19/2020-CT chest.  Impression: Largest 1.5 cm nodule at the lingula and new 5 mm pulmonary nodule (reported on outside scan not available).  Changes of right upper lobe resection.  Emphysema.  Right hydronephrosis versus extrarenal pelvis (outside imaging report 9/18/2019 indicates extrarenal pelvis but images not available for comparison).  Right adrenal nodule, incompletely characterized.  Prior splenectomy.  Possible left adrenalectomy and nephrectomy.         4.   03/03/2020-PET scan.  Hypermetabolic 1.5 cm pulmonary nodule in the lingula, highly suspicious for metastatic disease or primary lung neoplasm.  No other evidence of metastatic disease or abnormal FDG uptake.  2 cm left adrenal gland nodule, most compatible with adenoma.  6 mm pulmonary nodule at the right lung base in a background of scarring.  Non-FDG avid but too small to accurately characterize by PET scan.         5.   03/27/2020- seen for medical oncology by Dr. Ventura who referred her to Dr. Enriquez of pulmonary in assessment of tissue biopsy.         6.   05/27/2020- seen by Dr. Enriquez of pulmonary in assessment of the left upper lobe nodule.  Review of records from [facility]  "in [place] and [facility] indicate prior history of lung cancer on the right side resected and more recently urothelial cancer of the  system.  All have been treated.  More recent surveillance CT identified an asymptomatic lesion in the left upper lobe adjacent to the fissure.  PET scan showed hypermetabolism in this lesion.  Patient was referred to [facility] for possible navigational bronchoscopy/biopsy of the lesion that appears to be in the lingula.  Patient says she has received \"the maximum\" radiation for her lifetime.         7.   06/01/2020-CT chest.  Comparison 02/19/2020, 03/20/2020.  Impression: Slight increase in size of lingular nodule measuring 1.7 cm (previously 1.5 cm) concerning for malignancy.  Decreased size of right lower lobe pulmonary nodule now measures 0.3 cm (from 0.6 cm).  Stable postoperative changes in the right hilum.  Left nephrectomy.  Stable right adrenal low-density lesion, favored to represent an adenoma.         8.   06/10/2020- [facility] (Dr. Hare) bronchoscopy with biopsy.  Pathology: LUNG, LINGULA, TRANSBRONCHIAL BIOPSY:   · AT LEAST SQUAMOUS CELL CARCINOMA IN SITU.  · Immunohistochemistry demonstrates the neoplastic cells to be positive for p40 and negative for GATA3 and CK7, consistent with squamous differentiation.  LUNG NODULE, LINGULA, TRANSBRONCHIAL NEEDLE ASPIRATION WITH CELL BLOCK:   · INVOLVED BY CARINOMA, FAVOR SQUAMOUS CELL CARCINOMA (SEE COMMENT).  Comment: Immunohistochemical stains performed with appropriate controls show that the tumor cells are positive for p40 and negative for ELBA-3 and TTF-1. While this immunophenotype and cytomorphology can be seen in both a primary squamous cell carcinoma of the lung and metastatic urothelial carcinoma, the lack of reactivity for ELBA-3 and the presence of carcinoma in situ in the corresponding surgical biopsy [id number] would favor a primary lung carcinoma.            9.   " 06/10/2020-hemoglobin 12.6, hematocrit 37, MCV 95, platelets 346,000, WBC 10.3.  BMP with calcium of 8.1 otherwise normal with creatinine 0.99.      10.   07/20/2020- MRI brain w and wo contrast: No metastatic disease.  Mild chronic microvascular changes.      11. 08/25/2020- robotic lingular segmentectomy (Dr. Moshe Hand).  Diagnosis: A.lung, lingular segmentectomy: Squamous cell carcinoma, 2.2 cm in greatest extent; negative for pleural invasion; surgical margins negative for tumor; 1 benign lymph node (see synoptic).  B.lymph node, level 9L excision: 1 benign lymph node.  C.lymph node, level 8L, excision: 2 benign lymph nodes.  D.lymph node, 7L, excision: 1 benign lymph node.  E.lymph node, level 5, excision: 3 benign lymph nodes.  F.lymph node, 12 L, excision: 1 benign lymph node.  G.lymph node, level 11 L, excision: 4 benign lymph nodes.  H.lymph node, 13 L, excision: At least one benign lymph node.  I.bronchial staple line: Unremarkable bronchus; negative for tumor.  J.lung, final parenchymal margin: Unremarkable lung parenchyma.  Synoptic: Lung specimen-procedure: Segmentectomy.  Specimen laterality: Left.  Spread through airspaces (STS): Not identified.  Total tumor size (size of entire tumor): Greatest dimension 2.2 cm.  Size of invasive component: 2.2 cm.  Tumor focality: Single focus.  Visceropleural invasion: Not identified.  Direct invasion of adjacent structures: No adjacent structures present.  Treatment effect: No known presurgical therapy.  Lymphovascular invasion: Not identified.  Margins: Uninvolved by tumor.  Lymph nodes: Number of lymph nodes involved: 0.  Number of lymph nodes examined: 14.  Lev stations examined: 7: Subcarinal, 5: Sub-aortic/aortopulmonary/AP window, 8L: Paraesophageal, 9L, pulmonary ligament, 11 L, interlobar, 12 L, lobar 13 L segmental.  Pathologic stage classification (P TNM, AJCC eighth edition).  Primary tumor (pT): pT1c, regional lymph nodes (pN): pN0.  Likely  insufficient material for molecular testing.    LABS    Lab Results - Last 18 Months   Lab Units 07/26/21  1403 07/19/21  1321 01/19/21  1332 03/27/20  0927   HEMOGLOBIN g/dL 13.5 13.6 13.7 13.2   HEMATOCRIT % 39.3 39.1 39.3 38.5   MCV fL 93.6 92.7 92.7 93.7   WBC 10*3/mm3 12.28* 10.28 10.11 8.98   RDW % 13.9 13.9 13.7 13.8   MPV fL 10.1 10.1 10.3 9.9   PLATELETS 10*3/mm3 390 390 393 362   IMM GRAN % % 0.2 0.2 0.3 0.2   NEUTROS ABS 10*3/mm3 7.19* 6.06 5.66 5.55   LYMPHS ABS 10*3/mm3 3.88* 3.20* 3.42* 2.30   MONOS ABS 10*3/mm3 0.89 0.67 0.66 0.72   EOS ABS 10*3/mm3 0.23 0.27 0.27 0.32   BASOS ABS 10*3/mm3 0.06 0.06 0.07 0.07   IMMATURE GRANS (ABS) 10*3/mm3 0.03 0.02 0.03 0.02   NRBC /100 WBC 0.0 0.0 0.0 0.0       Lab Results - Last 18 Months   Lab Units 07/26/21  1403 01/19/21  1332 07/20/20  0940 03/27/20  0927   GLUCOSE mg/dL 132* 107*  --  133*   SODIUM mmol/L 137 139  --  143   POTASSIUM mmol/L 4.2 4.8  --  4.7   CO2 mmol/L 24.0 27.0  --  26.0   CHLORIDE mmol/L 102 102  --  97*   ANION GAP mmol/L 11.0 10.0  --  20.0*   CREATININE mg/dL 1.04* 0.97 1.10 1.07*   BUN mg/dL 19 23  --  24*   BUN / CREAT RATIO  18.3 23.7  --  22.4   CALCIUM mg/dL 9.6 9.4  --  9.2   EGFR IF NONAFRICN AM mL/min/1.73 52* 57*  --  51*   ALK PHOS U/L 92 83  --  71   TOTAL PROTEIN g/dL 7.1 7.1  --  6.7   ALT (SGPT) U/L 14 14  --  15   AST (SGOT) U/L 16 16  --  21   BILIRUBIN mg/dL 0.2 0.2  --  0.3   ALBUMIN g/dL 4.70 4.30  --  4.30   GLOBULIN gm/dL 2.4 2.8  --  2.4         PAST MEDICAL HISTORY:  ALLERGIES:  Allergies   Allergen Reactions   • Iodides Hives and Itching     HIVES AND ITCHING POST CT STUDY ON 9/4/15     2-24-16 WITH PREMEDICATION PT STILL HAD A REACTION WITH HIVES AND THROAT TIGHTENING   HIVES AND ITCHING POST CT STUDY ON 9/4/15     2-24-16 WITH PREMEDICATION PT STILL HAD A REACTION WITH HIVES AND THROAT TIGHTENING      • Iodinated Diagnostic Agents Hives     CURRENT MEDICATIONS:  Outpatient Encounter Medications as of 7/26/2021    Medication Sig Dispense Refill   • ALPRAZolam (XANAX) 0.25 MG tablet Take 0.25 mg by mouth As Needed.     • ALPRAZolam (XANAX) 0.25 MG tablet Take 1 tablet by mouth At Night As Needed for Anxiety. 10 tablet 0   • naproxen sodium (ALEVE) 220 MG tablet Take 220 mg by mouth 2 (Two) Times a Day As Needed.     • SUPER B COMPLEX/C PO Take  by mouth Daily.     • gabapentin (NEURONTIN) 100 MG capsule Take 100 mg by mouth 3 (Three) Times a Day.     • HYDROcodone-acetaminophen (NORCO) 5-325 MG per tablet Take 1 tablet by mouth 2 (two) times a day. 20 tablet 0   • melatonin 3 MG tablet Take 3 mg by mouth Every Night.       No facility-administered encounter medications on file as of 2021.       Adult illnesses:  Left urothelial carcinoma, metastatic with positive supraclavicular lymph node biopsy, 2009.  Lung cancer  Hives after CT contrast  Active tobacco use (55+ years)    Past surgeries:  Port-A-Cath placement, 3/27/2020   section  Left nephrectomy/adrenalectomy  Right upper lobe lung resection,   Appendectomy  Cholecystectomy  Splenectomy  Hernia repair  Cataracts removed with lenses  Blepharoplasty, OU  06/10/2020- transbronchial needle aspiration of lingular lung nodule ([place]).  Carcinoma, favor squamous cell carcinoma.    ADULT ILLNESSES:  Patient Active Problem List   Diagnosis Code   • Abnormal Pap smear, low grade squamous intraepithelial lesion (LGSIL) [id number]   • Acute bilateral back pain M54.9   • Adenopathy, cervical R59.0   • Allergic rhinitis J30.9   • Benign neoplasm of adrenal gland D35.00   • Bilateral cataracts H26.9   • Bilateral hearing loss H91.93   • Bilateral impacted cerumen H61.23   • Carpal tunnel syndrome G56.00   • Cervical high risk HPV (human papillomavirus) test positive R87.810   • Diverticulosis of large intestine K57.30   • Encounter for health-related screening Z13.9   • Leiomyoma of uterus, unspecified D25.9   • H/O splenectomy Z90.81   • Malignant neoplasm  of kidney (CMS/HCC) C64.9   • Malignant neoplasm of lung (CMS/HCC) C34.90   • Metastatic cancer to cervical lymph nodes (CMS/HCC) C77.0   • Neck mass R22.1   • Plantar fasciitis M72.2   • Pulmonary nodule R91.1   • Restless legs syndrome (RLS) G25.81   • Malignant neoplasm (CMS/HCC) C80.1   • Encounter for care related to Port-a-Cath Z45.2   • History of lung cancer Z85.118   • Metastatic renal cell carcinoma (CMS/HCC) C64.9   • Other nonspecific abnormal finding R68.89   • Current every day smoker F17.200   • History of lobectomy of lung Z90.2     SURGERIES:  Past Surgical History:   Procedure Laterality Date   • ABDOMINAL SURGERY     • APPENDECTOMY     •  SECTION     • CHOLECYSTECTOMY     • HERNIA REPAIR     • LUNG REMOVAL, PARTIAL Right    • NEPHRECTOMY Left      HEALTH MAINTENANCE ITEMS:  Health Maintenance Due   Topic Date Due   • DXA SCAN  Never done   • COLORECTAL CANCER SCREENING  Never done   • ZOSTER VACCINE (1 of 2) Never done   • HEPATITIS C SCREENING  Never done       <no information>  Last Completed Colonoscopy     This patient has no relevant Health Maintenance data.        Immunization History   Administered Date(s) Administered   • COVID-19 (MODERNA) 2021, 2021   • FLUAD TRI 65YR+ 2019   • Fluzone High Dose =>65 Years ([employer] ONLY) 2019   • H1N1 Inj 2009   • Influenza, Unspecified 10/09/2012, 10/08/2013, 10/08/2013, 10/08/2014, 10/12/2015, 10/13/2016   • Pneumococcal Conjugate 13-Valent (PCV13) 09/15/2016   • Pneumococcal Polysaccharide (PPSV23) 2018   • Tdap 2018     Last Completed Mammogram          MAMMOGRAM (Every 2 Years) Next due on 2019  Outside Claim: HC MAMMOGRAM SCREENING BILAT DIGITAL W CAD,CHG SCREENING DIGITAL BREAST TOMOSYNTHESIS BI    2018  Outside Procedure: CHG SCREENING DIGITAL BREAST TOMOSYNTHESIS BI                  FAMILY HISTORY:  Family History   Problem Relation Age of Onset   • No Known Problems  "Mother    • No Known Problems Father    • Cancer Sister    • No Known Problems Maternal Grandmother    • No Known Problems Maternal Grandfather    • No Known Problems Paternal Grandmother    • No Known Problems Paternal Grandfather    • No Known Problems Brother    • No Known Problems Maternal Aunt    • No Known Problems Maternal Uncle    • No Known Problems Paternal Aunt    • No Known Problems Paternal Uncle    • No Known Problems Other    • Asthma Neg Hx    • Diabetes Neg Hx    • Emphysema Neg Hx    • Heart failure Neg Hx    • Hypertension Neg Hx      SOCIAL HISTORY:  Social History     Socioeconomic History   • Marital status:      Spouse name: Not on file   • Number of children: Not on file   • Years of education: Not on file   • Highest education level: Not on file   Tobacco Use   • Smoking status: Current Every Day Smoker     Packs/day: 0.75     Years: 55.00     Pack years: 41.25     Types: Cigarettes     Start date: 1966   • Smokeless tobacco: Never Used   • Tobacco comment: half a pack now   Substance and Sexual Activity   • Alcohol use: Not Currently   • Drug use: Never   • Sexual activity: Defer       REVIEW OF SYSTEMS:  Review of Systems   Constitutional: Positive for fatigue (\"tired.\"). Negative for activity change, appetite change, chills, diaphoresis, fever, unexpected weight gain and unexpected weight loss.        Manages all her ADLs, including chores, and driving.  Lives alone. Still works part time at [employer].  Says she is still \"up and about all the time\"   HENT: Negative.    Eyes: Negative.    Respiratory: Positive for cough (productive of clear/cream colored/never blood tinged - phlegm) and shortness of breath (Admits to MEHTA, occasional SOB with her routine activities. ). Negative for chest tightness and wheezing.         Is still smoking > 1/2 ppd - smoker since age 15   Cardiovascular: Negative.    Gastrointestinal: Negative.    Genitourinary: Negative.    Musculoskeletal: Positive for " "arthralgias (shoulders, knees, back, left hip, hands). Negative for back pain (Left side radicular symptoms have been quiescent), joint swelling, myalgias and neck pain.   Skin: Negative.    Allergic/Immunologic: Positive for environmental allergies (\"pollen\").   Neurological: Positive for numbness (left hand, \"carpal tunnel\"). Negative for dizziness.   Hematological: Negative.    Psychiatric/Behavioral: Positive for depressed mood (\"on and off.\"). The patient is nervous/anxious.        /84   Pulse 60   Temp 97.6 °F (36.4 °C)   Resp 16   Ht 152.4 cm (60\")   Wt 69.2 kg (152 lb 8 oz)   SpO2 94%   Breastfeeding No   BMI 29.78 kg/m²  Body surface area is 1.66 meters squared.  Pain Score    07/26/21 1509   PainSc: 0-No pain       Physical Exam:  Physical Exam   Constitutional: She is oriented to person, place, and time. No distress.   Pleasant, heavy set, modestly kept elderly female.  Ambulatory.  ECOG 1.      She has gained 3 pounds (in addition to 7 pounds at her 2 prior visits) since her last visit.   HENT:   Head: Normocephalic and atraumatic.   Wearing a surgical mask today    Scarring of the left supraclavicular area from prior neck surgery   Eyes: Pupils are equal, round, and reactive to light. Conjunctivae are normal. No scleral icterus.   Neck: No JVD present. No tracheal deviation present. No thyromegaly present.   Cardiovascular: Normal rate and regular rhythm. Exam reveals no friction rub.   Pulmonary/Chest: No stridor. She has wheezes (soft, scattered). She has no rales.   Distant breath sounds    Healed thoracotomy incisions   Abdominal: Soft. Bowel sounds are normal. She exhibits no distension and no mass. There is no abdominal tenderness. There is no rebound and no guarding.   Musculoskeletal: Normal range of motion. Deformity (Osteoarthritic changes of the hands) present.   Lymphadenopathy:     She has no cervical adenopathy.   Neurological: She is alert and oriented to person, place, and " time. No cranial nerve deficit.   Skin: Skin is warm and dry. No rash noted. No erythema.   Psychiatric: Her behavior is normal. Thought content normal.   Vitals reviewed.      Assessment:  1.   Squamous cell lung carcinoma            Tumor stage: pTNM IA3 (pT1c,pN0, M0)            PET Tumor [place]: 1.7 cm nodule in the lingula.  No other PET evidence for metastatic disease.            Complications of tumor: None            Tumor status: Untreated.              -- Radiographically progressing (has grown to 1.7cm on chest CT, 06/01/2020 from 1.5 cm on chest CT, 02/19/2020).            -- 08/25/2020-robotic lingular segmentectomy (Dr. Moshe Hand).  Diagnosis: Squamous cell carcinoma, 2.2 cm in greatest extent; negative pleural invasion; surgical margins negative for tumor; 1 benign lymph node (1/14). pTNM: pT1c, pN0            --01/19/2021-CT chest.  Status post bilateral lung surgery with postop changes.  No CT evidence of malignancy or acute cardiopulmonary process.  Mild induration of the left axilla with small lymph nodes, significance uncertain.  Consider adenitis.  Stable low-attenuation right adrenal mass.  Changes from left nephrectomy.           --07/19/2021-CT chest.  Postsurgical changes.  No CT evidence of developing malignancy.  No acute cardiopulmonary process.    2.   History of non-small cell lung cancer in the right upper lobe, status post right upper lobectomy, 2004.  No details  3.   Metastatic urothelial carcinoma with positive supraclavicular lymph node biopsy and left nephrectomy, 2009.  Details unclear but apparently received chemotherapy and radiation.  4.   Tobacco use, 55+ years cigarette smoking  5.   Chronic kidney disease, stage III.    --GFR 52 mL/min, 07/26/2021 (prior: 51-57 mL/min)  6.   Abnormal CEA, 8.5 on 01/19/2021.  Tobacco smoker?  Needs follow-up.  7.   Anxiety.  Requests alprazolam  8.   Low-grade neutrophilic leukocytosis  --WBC 12.28; ANC 7.19.  Reactive to tobacco  smoking associated bronchitis?      Recommendations:  1.   Apprised of labs, 07/19/2021 and 07/26/2021 with low-grade leukocytosis otherwise normal CBC, mild hyperglycemia, and depressed (stable) GFR otherwise normal CMP, CEA 8.54 (no comparisons), and normal CBC.  2.   Apprised of chest CT, 07/19/2021 (above).  No evidence of malignancy.    3.   Request pathology at [facility] to send out biopsy specimen on 6/10/2020: EGFR, ALK, ROS 1, PDL1, BRAF-4th request.  4.   Review NCCN guidelines version 6.2020 non-small cell lung cancer-stage IA (peripheral T1 abc, N0).  If operable: Surgical exploration and resection plus mediastinal lymph node dissection/sampling.  Adjuvant chemotherapy only if node positive.  If margins negative (R0)-observation.  Surveillance- H&P and chest CT +/- contrast every 6 months for 2 to 3 years then H&P and low-dose noncontrast enhanced chest CT annually..  5.   Schedule CT chest without contrast in 15 weeks at [facility].  Compare to prior studies.   6.   Rx:  Xanax 0.5 mg po hs prn # 30  7.   Return to office in 16 weeks with pre-office CMP, CBC with differential, and CEA.    I spent 30 total minutes, face-to-face, caring for Jennie today.  Greater than 50% of this time involved counseling and/or coordination of care as documented within this note regarding the patient's illness(es), pros and cons of various treatment options, instructions and/or risk reduction.

## 2021-07-19 ENCOUNTER — HOSPITAL ENCOUNTER (OUTPATIENT)
Dept: CT IMAGING | Facility: HOSPITAL | Age: 71
Discharge: HOME | End: 2021-07-19
Admitting: INTERNAL MEDICINE

## 2021-07-19 ENCOUNTER — LAB (OUTPATIENT)
Dept: LAB | Facility: HOSPITAL | Age: 71
End: 2021-07-19

## 2021-07-19 DIAGNOSIS — C34.92 MALIGNANT NEOPLASM OF LEFT LUNG, UNSPECIFIED PART OF LUNG (HCC): ICD-10-CM

## 2021-07-19 LAB
BASOPHILS # BLD AUTO: 0.06 10*3/MM3 (ref 0–0.2)
BASOPHILS NFR BLD AUTO: 0.6 % (ref 0–1.5)
CEA SERPL-MCNC: 9.24 NG/ML
DEPRECATED RDW RBC AUTO: 46.7 FL (ref 37–54)
EOSINOPHIL # BLD AUTO: 0.27 10*3/MM3 (ref 0–0.4)
EOSINOPHIL NFR BLD AUTO: 2.6 % (ref 0.3–6.2)
ERYTHROCYTE [DISTWIDTH] IN BLOOD BY AUTOMATED COUNT: 13.9 % (ref 12.3–15.4)
HCT VFR BLD AUTO: 39.1 % (ref 34–46.6)
HGB BLD-MCNC: 13.6 G/DL (ref 12–15.9)
IMM GRANULOCYTES # BLD AUTO: 0.02 10*3/MM3 (ref 0–0.05)
IMM GRANULOCYTES NFR BLD AUTO: 0.2 % (ref 0–0.5)
LYMPHOCYTES # BLD AUTO: 3.2 10*3/MM3 (ref 0.7–3.1)
LYMPHOCYTES NFR BLD AUTO: 31.1 % (ref 19.6–45.3)
MCH RBC QN AUTO: 32.2 PG (ref 26.6–33)
MCHC RBC AUTO-ENTMCNC: 34.8 G/DL (ref 31.5–35.7)
MCV RBC AUTO: 92.7 FL (ref 79–97)
MONOCYTES # BLD AUTO: 0.67 10*3/MM3 (ref 0.1–0.9)
MONOCYTES NFR BLD AUTO: 6.5 % (ref 5–12)
NEUTROPHILS NFR BLD AUTO: 59 % (ref 42.7–76)
NEUTROPHILS NFR BLD AUTO: 6.06 10*3/MM3 (ref 1.7–7)
NRBC BLD AUTO-RTO: 0 /100 WBC (ref 0–0.2)
PLATELET # BLD AUTO: 390 10*3/MM3 (ref 140–450)
PMV BLD AUTO: 10.1 FL (ref 6–12)
RBC # BLD AUTO: 4.22 10*6/MM3 (ref 3.77–5.28)
WBC # BLD AUTO: 10.28 10*3/MM3 (ref 3.4–10.8)

## 2021-07-19 PROCEDURE — 71250 CT THORAX DX C-: CPT

## 2021-07-19 PROCEDURE — 36415 COLL VENOUS BLD VENIPUNCTURE: CPT

## 2021-07-19 PROCEDURE — 85025 COMPLETE CBC W/AUTO DIFF WBC: CPT

## 2021-07-19 PROCEDURE — 82378 CARCINOEMBRYONIC ANTIGEN: CPT

## 2021-07-26 ENCOUNTER — LAB (OUTPATIENT)
Dept: LAB | Facility: HOSPITAL | Age: 71
End: 2021-07-26

## 2021-07-26 ENCOUNTER — OFFICE VISIT (OUTPATIENT)
Dept: ONCOLOGY | Facility: CLINIC | Age: 71
End: 2021-07-26

## 2021-07-26 VITALS
HEIGHT: 60 IN | WEIGHT: 152.5 LBS | BODY MASS INDEX: 29.94 KG/M2 | OXYGEN SATURATION: 94 % | RESPIRATION RATE: 16 BRPM | DIASTOLIC BLOOD PRESSURE: 84 MMHG | HEART RATE: 60 BPM | SYSTOLIC BLOOD PRESSURE: 126 MMHG | TEMPERATURE: 97.6 F

## 2021-07-26 DIAGNOSIS — C34.92 MALIGNANT NEOPLASM OF LEFT LUNG, UNSPECIFIED PART OF LUNG (HCC): Primary | ICD-10-CM

## 2021-07-26 DIAGNOSIS — C64.2 MALIGNANT NEOPLASM OF LEFT KIDNEY (HCC): Primary | ICD-10-CM

## 2021-07-26 DIAGNOSIS — R97.0 ELEVATED CARCINOEMBRYONIC ANTIGEN (CEA): ICD-10-CM

## 2021-07-26 LAB
ALBUMIN SERPL-MCNC: 4.7 G/DL (ref 3.5–5.2)
ALBUMIN/GLOB SERPL: 2 G/DL
ALP SERPL-CCNC: 92 U/L (ref 39–117)
ALT SERPL W P-5'-P-CCNC: 14 U/L (ref 1–33)
ANION GAP SERPL CALCULATED.3IONS-SCNC: 11 MMOL/L (ref 5–15)
AST SERPL-CCNC: 16 U/L (ref 1–32)
BASOPHILS # BLD AUTO: 0.06 10*3/MM3 (ref 0–0.2)
BASOPHILS NFR BLD AUTO: 0.5 % (ref 0–1.5)
BILIRUB SERPL-MCNC: 0.2 MG/DL (ref 0–1.2)
BUN SERPL-MCNC: 19 MG/DL (ref 8–23)
BUN/CREAT SERPL: 18.3 (ref 7–25)
CALCIUM SPEC-SCNC: 9.6 MG/DL (ref 8.6–10.5)
CHLORIDE SERPL-SCNC: 102 MMOL/L (ref 98–107)
CO2 SERPL-SCNC: 24 MMOL/L (ref 22–29)
CREAT SERPL-MCNC: 1.04 MG/DL (ref 0.57–1)
DEPRECATED RDW RBC AUTO: 48 FL (ref 37–54)
EOSINOPHIL # BLD AUTO: 0.23 10*3/MM3 (ref 0–0.4)
EOSINOPHIL NFR BLD AUTO: 1.9 % (ref 0.3–6.2)
ERYTHROCYTE [DISTWIDTH] IN BLOOD BY AUTOMATED COUNT: 13.9 % (ref 12.3–15.4)
GFR SERPL CREATININE-BSD FRML MDRD: 52 ML/MIN/1.73
GLOBULIN UR ELPH-MCNC: 2.4 GM/DL
GLUCOSE SERPL-MCNC: 132 MG/DL (ref 65–99)
HCT VFR BLD AUTO: 39.3 % (ref 34–46.6)
HGB BLD-MCNC: 13.5 G/DL (ref 12–15.9)
IMM GRANULOCYTES # BLD AUTO: 0.03 10*3/MM3 (ref 0–0.05)
IMM GRANULOCYTES NFR BLD AUTO: 0.2 % (ref 0–0.5)
LYMPHOCYTES # BLD AUTO: 3.88 10*3/MM3 (ref 0.7–3.1)
LYMPHOCYTES NFR BLD AUTO: 31.6 % (ref 19.6–45.3)
MCH RBC QN AUTO: 32.1 PG (ref 26.6–33)
MCHC RBC AUTO-ENTMCNC: 34.4 G/DL (ref 31.5–35.7)
MCV RBC AUTO: 93.6 FL (ref 79–97)
MONOCYTES # BLD AUTO: 0.89 10*3/MM3 (ref 0.1–0.9)
MONOCYTES NFR BLD AUTO: 7.2 % (ref 5–12)
NEUTROPHILS NFR BLD AUTO: 58.6 % (ref 42.7–76)
NEUTROPHILS NFR BLD AUTO: 7.19 10*3/MM3 (ref 1.7–7)
NRBC BLD AUTO-RTO: 0 /100 WBC (ref 0–0.2)
PLATELET # BLD AUTO: 390 10*3/MM3 (ref 140–450)
PMV BLD AUTO: 10.1 FL (ref 6–12)
POTASSIUM SERPL-SCNC: 4.2 MMOL/L (ref 3.5–5.2)
PROT SERPL-MCNC: 7.1 G/DL (ref 6–8.5)
RBC # BLD AUTO: 4.2 10*6/MM3 (ref 3.77–5.28)
SODIUM SERPL-SCNC: 137 MMOL/L (ref 136–145)
WBC # BLD AUTO: 12.28 10*3/MM3 (ref 3.4–10.8)

## 2021-07-26 PROCEDURE — 36415 COLL VENOUS BLD VENIPUNCTURE: CPT | Performed by: INTERNAL MEDICINE

## 2021-07-26 PROCEDURE — 85025 COMPLETE CBC W/AUTO DIFF WBC: CPT | Performed by: INTERNAL MEDICINE

## 2021-07-26 PROCEDURE — 82378 CARCINOEMBRYONIC ANTIGEN: CPT | Performed by: INTERNAL MEDICINE

## 2021-07-26 PROCEDURE — 80053 COMPREHEN METABOLIC PANEL: CPT | Performed by: INTERNAL MEDICINE

## 2021-07-26 PROCEDURE — 99214 OFFICE O/P EST MOD 30 MIN: CPT | Performed by: INTERNAL MEDICINE

## 2021-07-26 RX ORDER — ALPRAZOLAM 0.5 MG/1
0.5 TABLET ORAL NIGHTLY PRN
Qty: 10 TABLET | Refills: 0 | Status: SHIPPED | OUTPATIENT
Start: 2021-07-26 | End: 2021-08-25

## 2021-07-27 LAB — CEA SERPL-MCNC: 9.53 NG/ML

## 2021-09-25 PROCEDURE — U0004 COV-19 TEST NON-CDC HGH THRU: HCPCS | Performed by: NURSE PRACTITIONER

## 2021-11-01 ENCOUNTER — TELEPHONE (OUTPATIENT)
Dept: ENT CLINIC | Age: 71
End: 2021-11-01

## 2021-11-01 NOTE — TELEPHONE ENCOUNTER
Patient calling to schedule appointment to have ears checked again. Pt stated that she can't hear anything.

## 2021-11-16 ENCOUNTER — OFFICE VISIT (OUTPATIENT)
Dept: ENT CLINIC | Age: 71
End: 2021-11-16
Payer: MEDICARE

## 2021-11-16 ENCOUNTER — PROCEDURE VISIT (OUTPATIENT)
Dept: ENT CLINIC | Age: 71
End: 2021-11-16
Payer: MEDICARE

## 2021-11-16 VITALS — TEMPERATURE: 97.5 F | BODY MASS INDEX: 30.72 KG/M2 | HEIGHT: 59 IN | WEIGHT: 152.4 LBS | OXYGEN SATURATION: 97 %

## 2021-11-16 DIAGNOSIS — H90.3 SENSORINEURAL HEARING LOSS (SNHL) OF BOTH EARS: Primary | ICD-10-CM

## 2021-11-16 DIAGNOSIS — H61.23 BILATERAL IMPACTED CERUMEN: Primary | ICD-10-CM

## 2021-11-16 PROCEDURE — 92557 COMPREHENSIVE HEARING TEST: CPT | Performed by: AUDIOLOGIST

## 2021-11-16 PROCEDURE — 92567 TYMPANOMETRY: CPT | Performed by: AUDIOLOGIST

## 2021-11-16 PROCEDURE — 69210 REMOVE IMPACTED EAR WAX UNI: CPT | Performed by: PHYSICIAN ASSISTANT

## 2021-11-16 ASSESSMENT — ENCOUNTER SYMPTOMS
TROUBLE SWALLOWING: 0
SINUS PAIN: 0
FACIAL SWELLING: 0
SORE THROAT: 0
RHINORRHEA: 0
EYE DISCHARGE: 0
SINUS PRESSURE: 0
VOICE CHANGE: 0
EYE PAIN: 0

## 2021-11-16 NOTE — PROGRESS NOTES
History   Michelle De La Paz is a 69 y.o. female who presented to the clinic this date with complaints of decreased hearing bilaterally. She reported a long standing gradual decline. Summary   Tympanometry consistent with normal TM mobility bilaterally. Pure tone testing indicates normal to severe SNHL bilaterally. Word recognition scores were excellent bilaterally. Based on degree of hearing loss, binaural hearing aids are recommended. Results   Otoscopy:    Right: Clear EAC/Normal TM   Left: Clear EAC/Normal TM    Audiometry:    Right: Normal to severe sloping SNHL   Left: Normal to severe sloping SNHL         Tympanometry:     Right: Type A   Left: Type A    Plan   Results of today's testing were discussed with Ms. Regulo Ramirez and the following recommendations were made:    1. Follow up with ENT as scheduled. 2. Monitor hearing yearly, sooner with changes. 3. Hearing aid evaluation as desired. 4. Hearing protection as warranted.         Audiogram and Acoustic Immittance

## 2021-11-16 NOTE — PROGRESS NOTES
[facility] OTOLARYNGOLOGY/ENT  Mrs. Tayla Khoury is a pleasant 62-year-old  female that presents for a 6-month follow-up cerumen check. She reports that she has noticed gradual diminished hearing with the left greater than right. She denies any drainage from the ear canal and also denies any issues with fever or chills. She admits to having preestablished hearing loss which has been mild in the past.  She is currently scheduled for audiology studies later today. Allergies: Iodides      Current Outpatient Medications   Medication Sig Dispense Refill    Cyanocobalamin (VITAMIN B 12 PO) Take by mouth      Ginkgo Biloba (GNP GINGKO BILOBA EXTRACT PO) Take by mouth      ALPRAZolam (XANAX) 0.25 MG tablet TAKE 1 TABLET BY MOUTH THREE TIMES DAILY AS NEEDED FOR ANXIETY      ASA-APAP-Caff Buffered 989-625-99 MG TABS Take 2 tablets by mouth      melatonin 3 MG TABS tablet Take 3 mg by mouth daily      fluticasone (FLONASE) 50 MCG/ACT nasal spray 2 sprays by Each Nostril route daily (Patient not taking: Reported on 2021) 1 Bottle 5     No current facility-administered medications for this visit. Past Surgical History:   Procedure Laterality Date     SECTION      KIDNEY REMOVAL      LUNG SURGERY      partial removal    THROAT SURGERY         Past Medical History:   Diagnosis Date    Arthritis     Cancer ([address]     kidney    Vocal cord nodules        Family History   Problem Relation Age of Onset    No Known Problems Mother     No Known Problems Father        Social History     Tobacco Use    Smoking status: Current Every Day Smoker     Packs/day: 0.50    Smokeless tobacco: Never Used   Substance Use Topics    Alcohol use: Never           REVIEW OF SYSTEMS:  all other systems reviewed and are negative  Review of Systems   Constitutional: Negative for chills and fever. HENT: Positive for hearing loss.  Negative for congestion, dental problem, ear discharge, ear pain, facial swelling, nosebleeds, postnasal drip, rhinorrhea, sinus pressure, sinus pain, sneezing, sore throat, tinnitus, trouble swallowing and voice change. Eyes: Negative for pain and discharge. Neurological: Negative for dizziness and headaches. Comments:     PHYSICAL EXAM:    Temp 97.5 °F (36.4 °C)   Ht 4' 11\" (1.499 m)   Wt 152 lb 6.4 oz (69.1 kg)   SpO2 97%   BMI 30.78 kg/m²   Body mass index is 30.78 kg/m². General Appearance: well developed  and well nourished  Head/ Face: normocephalic and atraumatic  Vocal Quality: good/ normal  Ears: Right Ear: External: external ears normal Otoscopy Ear Canal: cerumen impaction Otoscopy TM: TM's normal and TM's mobile Left Ear: External: external ears normal Otoscopy Ear Canal: cerumen impaction Otoscopy TM: TM's normal and TM's mobile  Hearing: Rinne A>B: Left, Rinne A>B: Right and Stevens M  Nose: nares normal and septum midline  Neck: supple and adenopathy none palpable  Thyroid: normal    Assessment & Plan:    Problem List Items Addressed This Visit     Bilateral impacted cerumen - Primary     Bilateral cerumen impaction-will clean today with microscopic guidance  Plan: Due to the patient's history of recurrent cerumen impactions, she is to follow-up in 6 months for cerumen check. Relevant Orders    24719 - CA REMOVE IMPACTED EAR WAX (Completed)          Orders Placed This Encounter   Procedures    52509 - CA REMOVE IMPACTED EAR WAX     With microscopic guidance, the cerumen impaction was removed bilaterally with assistance of alligator graspers with no complications. A large amount of cerumen was extracted bilaterally without any complications. The TM appeared to be normal with no evidence of perforation or infection. No orders of the defined types were placed in this encounter.     Electronically signed by Hal Paniagua PA-C on 11/16/21 at 10:47 AM CST            Please note that this chart was generated using dragon dictation software. Although every effort was made to ensure the accuracy of this automated transcription, some errors in transcription may have occurred.

## 2021-11-16 NOTE — ASSESSMENT & PLAN NOTE
Bilateral cerumen impactionwill clean today with microscopic guidance  Plan: Due to the patient's history of recurrent cerumen impactions, she is to follow-up in 6 months for cerumen check.

## 2021-12-22 ENCOUNTER — TELEPHONE (OUTPATIENT)
Dept: ONCOLOGY | Facility: CLINIC | Age: 71
End: 2021-12-22

## 2021-12-22 DIAGNOSIS — R97.0 ELEVATED CARCINOEMBRYONIC ANTIGEN (CEA): ICD-10-CM

## 2021-12-22 DIAGNOSIS — C80.1 MALIGNANT NEOPLASM (HCC): Primary | ICD-10-CM

## 2021-12-22 NOTE — TELEPHONE ENCOUNTER
Provider: DR CONCEPCION    Caller: JAKE    Relationship to Patient: SELF    Reason for Call: JAKE HAD AN APPOINTMENT FOR LABS AND A CT ON 11-11-21, THAT SHE CANCELED. SHE ALSO HAD AN APPOINTMENT WITH DR ALVARENGA ON 11-18.  SHE WOULD LIKE TO RESCHEDULE THESES APPOINTMENTS AND ALSO GET HER MAMMOGRAM SCHEDULED AS WELL.

## 2021-12-27 ENCOUNTER — HOSPITAL ENCOUNTER (OUTPATIENT)
Dept: CT IMAGING | Facility: HOSPITAL | Age: 71
Discharge: HOME | End: 2021-12-27
Admitting: INTERNAL MEDICINE

## 2021-12-27 ENCOUNTER — HOSPITAL ENCOUNTER (OUTPATIENT)
Dept: CT IMAGING | Facility: HOSPITAL | Age: 71
End: 2021-12-27

## 2021-12-27 DIAGNOSIS — C80.1 MALIGNANT NEOPLASM (HCC): ICD-10-CM

## 2021-12-27 PROCEDURE — 71250 CT THORAX DX C-: CPT

## 2022-01-27 ENCOUNTER — TELEPHONE (OUTPATIENT)
Dept: ONCOLOGY | Facility: CLINIC | Age: 72
End: 2022-01-27

## 2022-01-27 ENCOUNTER — LAB (OUTPATIENT)
Dept: LAB | Facility: HOSPITAL | Age: 72
End: 2022-01-27

## 2022-01-27 DIAGNOSIS — R97.0 ELEVATED CARCINOEMBRYONIC ANTIGEN (CEA): ICD-10-CM

## 2022-01-27 DIAGNOSIS — C80.1 MALIGNANT NEOPLASM: ICD-10-CM

## 2022-01-27 LAB
BASOPHILS # BLD AUTO: 0.07 10*3/MM3 (ref 0–0.2)
BASOPHILS NFR BLD AUTO: 0.8 % (ref 0–1.5)
CEA SERPL-MCNC: 7.69 NG/ML
DEPRECATED RDW RBC AUTO: 50.1 FL (ref 37–54)
EOSINOPHIL # BLD AUTO: 0.32 10*3/MM3 (ref 0–0.4)
EOSINOPHIL NFR BLD AUTO: 3.5 % (ref 0.3–6.2)
ERYTHROCYTE [DISTWIDTH] IN BLOOD BY AUTOMATED COUNT: 14.3 % (ref 12.3–15.4)
HCT VFR BLD AUTO: 41.8 % (ref 34–46.6)
HGB BLD-MCNC: 13.8 G/DL (ref 12–15.9)
IMM GRANULOCYTES # BLD AUTO: 0.02 10*3/MM3 (ref 0–0.05)
IMM GRANULOCYTES NFR BLD AUTO: 0.2 % (ref 0–0.5)
LYMPHOCYTES # BLD AUTO: 2.25 10*3/MM3 (ref 0.7–3.1)
LYMPHOCYTES NFR BLD AUTO: 24.6 % (ref 19.6–45.3)
MCH RBC QN AUTO: 31.6 PG (ref 26.6–33)
MCHC RBC AUTO-ENTMCNC: 33 G/DL (ref 31.5–35.7)
MCV RBC AUTO: 95.7 FL (ref 79–97)
MONOCYTES # BLD AUTO: 0.66 10*3/MM3 (ref 0.1–0.9)
MONOCYTES NFR BLD AUTO: 7.2 % (ref 5–12)
NEUTROPHILS NFR BLD AUTO: 5.83 10*3/MM3 (ref 1.7–7)
NEUTROPHILS NFR BLD AUTO: 63.7 % (ref 42.7–76)
NRBC BLD AUTO-RTO: 0 /100 WBC (ref 0–0.2)
PLATELET # BLD AUTO: 354 10*3/MM3 (ref 140–450)
PMV BLD AUTO: 10.5 FL (ref 6–12)
RBC # BLD AUTO: 4.37 10*6/MM3 (ref 3.77–5.28)
WBC NRBC COR # BLD: 9.15 10*3/MM3 (ref 3.4–10.8)

## 2022-01-27 PROCEDURE — 82378 CARCINOEMBRYONIC ANTIGEN: CPT

## 2022-01-27 PROCEDURE — 85025 COMPLETE CBC W/AUTO DIFF WBC: CPT

## 2022-01-27 PROCEDURE — 36415 COLL VENOUS BLD VENIPUNCTURE: CPT

## 2022-01-28 DIAGNOSIS — C80.1 MALIGNANT NEOPLASM: Primary | ICD-10-CM

## 2022-03-04 NOTE — PROGRESS NOTES
[facility] ON[facility] HEMATOLOGY AND ONCOLOGY  [address]  [place] [address] [phone]    Patient Name: Jennie Molina  Encounter Date: 07/26/2021  YOB: 1950  Patient Number: [id number]      REASON FOR VISIT: Jennie Molina is a 71-year-old female who returns in follow-up of 2 lung cancers: Squamous cell carcinoma in the right upper lobe, status post partial resection, 2004. On 08/25/2020-underwent robotic lingular segmentectomy for squamous cell carcinoma, 2.2 cm in greatest extent; negative pleural invasion; surgical margins negative for tumor; 1 benign lymph node (1/14). pTNM: pT1c, pN0.  She is here alone.    I have reviewed the HPI and verified with the patient the accuracy of it. No changes to interval history since the information was documented. Adelfo Grider MD 03/08/22     DIAGNOSTIC ABNORMALITIES/PREVIOUS INTERVENTIONS:         1.   History of active tobacco use (55+ years smoking history), urothelial carcinoma with positive supraclavicular lymph node biopsy, and left nephrectomy, 2009.  Had received chemotherapy and radiation.          -    2004 - History of right lung cancer:   · Station 7 node, biopsy:    ? Negative for carcinoma.  · Station 4, biopsy:    ? Negative for carcinoma.  · Lung, right upper lobe, mass, excisional biopsy:   ? Poorly differentiated non-small cell carcinoma (see microscopic description).  · Lymph node, station 7, resection:  ? Thirteen lymph nodes, negative for tumor (0/13).  · Lymph node, station 4R, resection:  ? Eighteen lymph nodes, negative for tumor (0/18).  · Lymph node, station 11R, resection:  ? Four lymph nodes, negative for tumor (0/4).  · Rib, 5th, resection:    ? Negative for carcinoma.  · Lymph node, station 10R, resection:  ? One lymph node, negative for tumor (0/1).  · Right upper lobe mass, excisional biopsy:  ? Poorly differentiated non small cell carcinoma; see Tumor  Characteristics and comment.  ? Tumor extends to the inked pleural surface.  · Right upper lobe, lobectomy:  ? Focal hemorrhage and collapse.  ? Negative for carcinoma.                     -    2009 - History of urothelial carcinoma:   10/21/2009:  Left kidney, radial nephrectomy:  · High grade urothelial carcinoma with extensive necrosis.  · Tumor arises in renal pelvis (in situ carcinoma) and invades through kidney to perinephric fat.  · Lymphovascular invasion: Not identified.  · Margins: Negative.  · One lymph node, negative for malignancy (0/1).  Left hilar lymph node:  · One lymph node, negative for malignancy (0/1).  Spleen:  · Spleen without significant pathologic change           -    11/13/2009:  Left supraclavicular lymph node:  Metastatic high grade carcinoma involving one of four lymph nodes (1/4), see comment.           2.   Surveillance CTs in October 2019 was found to have a new 5 mm nodule in the left lingula.         3.   02/19/2020-CT chest.  Impression: Largest 1.5 cm nodule at the lingula and new 5 mm pulmonary nodule (reported on outside scan not available).  Changes of right upper lobe resection.  Emphysema.  Right hydronephrosis versus extrarenal pelvis (outside imaging report 9/18/2019 indicates extrarenal pelvis but images not available for comparison).  Right adrenal nodule, incompletely characterized.  Prior splenectomy.  Possible left adrenalectomy and nephrectomy.         4.   03/03/2020-PET scan.  Hypermetabolic 1.5 cm pulmonary nodule in the lingula, highly suspicious for metastatic disease or primary lung neoplasm.  No other evidence of metastatic disease or abnormal FDG uptake.  2 cm left adrenal gland nodule, most compatible with adenoma.  6 mm pulmonary nodule at the right lung base in a background of scarring.  Non-FDG avid but too small to accurately characterize by PET scan.         5.   03/27/2020- seen for medical oncology by Dr. Ventura who referred her to Dr. Enriquez of  "pulmonary in assessment of tissue biopsy.         6.   05/27/2020- seen by Dr. Enriquez of pulmonary in assessment of the left upper lobe nodule.  Review of records from [facility] indicate prior history of lung cancer on the right side resected and more recently urothelial cancer of the  system.  All have been treated.  More recent surveillance CT identified an asymptomatic lesion in the left upper lobe adjacent to the fissure.  PET scan showed hypermetabolism in this lesion.  Patient was referred to [facility] for possible navigational bronchoscopy/biopsy of the lesion that appears to be in the lingula.  Patient says she has received \"the maximum\" radiation for her lifetime.         7.   06/01/2020-CT chest.  Comparison 02/19/2020, 03/20/2020.  Impression: Slight increase in size of lingular nodule measuring 1.7 cm (previously 1.5 cm) concerning for malignancy.  Decreased size of right lower lobe pulmonary nodule now measures 0.3 cm (from 0.6 cm).  Stable postoperative changes in the right hilum.  Left nephrectomy.  Stable right adrenal low-density lesion, favored to represent an adenoma.         8.   06/10/2020- [facility] (Dr. Hare) bronchoscopy with biopsy.  Pathology: LUNG, LINGULA, TRANSBRONCHIAL BIOPSY:   · AT LEAST SQUAMOUS CELL CARCINOMA IN SITU.  · Immunohistochemistry demonstrates the neoplastic cells to be positive for p40 and negative for GATA3 and CK7, consistent with squamous differentiation.  LUNG NODULE, LINGULA, TRANSBRONCHIAL NEEDLE ASPIRATION WITH CELL BLOCK:   · INVOLVED BY CARINOMA, FAVOR SQUAMOUS CELL CARCINOMA (SEE COMMENT).  Comment: Immunohistochemical stains performed with appropriate controls show that the tumor cells are positive for p40 and negative for ELBA-3 and TTF-1. While this immunophenotype and cytomorphology can be seen in both a primary squamous cell carcinoma of the lung and metastatic " urothelial carcinoma, the lack of reactivity for ELBA-3 and the presence of carcinoma in situ in the corresponding surgical biopsy [id number] would favor a primary lung carcinoma.            9.   06/10/2020-hemoglobin 12.6, hematocrit 37, MCV 95, platelets 346,000, WBC 10.3.  BMP with calcium of 8.1 otherwise normal with creatinine 0.99.      10.   07/20/2020- MRI brain w and wo contrast: No metastatic disease.  Mild chronic microvascular changes.      11. 08/25/2020- robotic lingular segmentectomy (Dr. Moshe Hand).  Diagnosis: A.lung, lingular segmentectomy: Squamous cell carcinoma, 2.2 cm in greatest extent; negative for pleural invasion; surgical margins negative for tumor; 1 benign lymph node (see synoptic).  B.lymph node, level 9L excision: 1 benign lymph node.  C.lymph node, level 8L, excision: 2 benign lymph nodes.  D.lymph node, 7L, excision: 1 benign lymph node.  E.lymph node, level 5, excision: 3 benign lymph nodes.  F.lymph node, 12 L, excision: 1 benign lymph node.  G.lymph node, level 11 L, excision: 4 benign lymph nodes.  H.lymph node, 13 L, excision: At least one benign lymph node.  I.bronchial staple line: Unremarkable bronchus; negative for tumor.  J.lung, final parenchymal margin: Unremarkable lung parenchyma.  Synoptic: Lung specimen-procedure: Segmentectomy.  Specimen laterality: Left.  Spread through airspaces (STS): Not identified.  Total tumor size (size of entire tumor): Greatest dimension 2.2 cm.  Size of invasive component: 2.2 cm.  Tumor focality: Single focus.  Visceropleural invasion: Not identified.  Direct invasion of adjacent structures: No adjacent structures present.  Treatment effect: No known presurgical therapy.  Lymphovascular invasion: Not identified.  Margins: Uninvolved by tumor.  Lymph nodes: Number of lymph nodes involved: 0.  Number of lymph nodes examined: 14.  Lev stations examined: 7: Subcarinal, 5: Sub-aortic/aortopulmonary/AP window, 8L: Paraesophageal, 9L,  pulmonary ligament, 11 L, interlobar, 12 L, lobar 13 L segmental.  Pathologic stage classification (P TNM, AJCC eighth edition).  Primary tumor (pT): pT1c, regional lymph nodes (pN): pN0.  Likely insufficient material for molecular testing.    LABS    Lab Results - Last 18 Months   Lab Units 03/08/22  1058 01/27/22  1127 07/26/21  1403 07/19/21  1321 01/19/21  1332   HEMOGLOBIN g/dL 13.9 13.8 13.5 13.6 13.7   HEMATOCRIT % 40.9 41.8 39.3 39.1 39.3   MCV fL 96.5 95.7 93.6 92.7 92.7   WBC 10*3/mm3 9.82 9.15 12.28* 10.28 10.11   RDW % 14.4 14.3 13.9 13.9 13.7   MPV fL 9.7 10.5 10.1 10.1 10.3   PLATELETS 10*3/mm3 400 354 390 390 393   IMM GRAN % % 0.5 0.2 0.2 0.2 0.3   NEUTROS ABS 10*3/mm3 6.34 5.83 7.19* 6.06 5.66   LYMPHS ABS 10*3/mm3 2.34 2.25 3.88* 3.20* 3.42*   MONOS ABS 10*3/mm3 0.79 0.66 0.89 0.67 0.66   EOS ABS 10*3/mm3 0.23 0.32 0.23 0.27 0.27   BASOS ABS 10*3/mm3 0.07 0.07 0.06 0.06 0.07   IMMATURE GRANS (ABS) 10*3/mm3 0.05 0.02 0.03 0.02 0.03   NRBC /100 WBC 0.0 0.0 0.0 0.0 0.0       Lab Results - Last 18 Months   Lab Units 03/08/22  1058 07/26/21  1403 01/19/21  1332   GLUCOSE mg/dL 139* 132* 107*   SODIUM mmol/L 141 137 139   POTASSIUM mmol/L 4.4 4.2 4.8   CO2 mmol/L 27.0 24.0 27.0   CHLORIDE mmol/L 103 102 102   ANION GAP mmol/L 11.0 11.0 10.0   CREATININE mg/dL 0.98 1.04* 0.97   BUN mg/dL 17 19 23   BUN / CREAT RATIO  17.3 18.3 23.7   CALCIUM mg/dL 9.5 9.6 9.4   EGFR IF NONAFRICN AM mL/min/1.73  --  52* 57*   ALK PHOS U/L 85 92 83   TOTAL PROTEIN g/dL 6.9 7.1 7.1   ALT (SGPT) U/L 18 14 14   AST (SGOT) U/L 18 16 16   BILIRUBIN mg/dL 0.2 0.2 0.2   ALBUMIN g/dL 4.40 4.70 4.30   GLOBULIN gm/dL 2.5 2.4 2.8         PAST MEDICAL HISTORY:  ALLERGIES:  Allergies   Allergen Reactions   • Iodides Hives and Itching     HIVES AND ITCHING POST CT STUDY ON 9/4/15     2-24-16 WITH PREMEDICATION PT STILL HAD A REACTION WITH HIVES AND THROAT TIGHTENING   HIVES AND ITCHING POST CT STUDY ON 9/4/15     2-24-16 WITH  PREMEDICATION PT STILL HAD A REACTION WITH HIVES AND THROAT TIGHTENING      • Iodinated Diagnostic Agents Hives     CURRENT MEDICATIONS:  Outpatient Encounter Medications as of 3/8/2022   Medication Sig Dispense Refill   • naproxen sodium (ALEVE) 220 MG tablet Take 220 mg by mouth 2 (Two) Times a Day As Needed.     • SUPER B COMPLEX/C PO Take  by mouth Daily.     • gabapentin (NEURONTIN) 100 MG capsule Take 100 mg by mouth 3 (Three) Times a Day.     • HYDROcodone-acetaminophen (NORCO) 5-325 MG per tablet Take 1 tablet by mouth 2 (two) times a day. 20 tablet 0   • melatonin 3 MG tablet Take 3 mg by mouth Every Night.       No facility-administered encounter medications on file as of 3/8/2022.       Adult illnesses:  Left urothelial carcinoma, metastatic with positive supraclavicular lymph node biopsy, 2009.  Lung cancer  Hives after CT contrast  Active tobacco use (55+ years)    Past surgeries:  Port-A-Cath placement, 3/27/2020   section  Left nephrectomy/adrenalectomy  Right upper lobe lung resection,   Appendectomy  Cholecystectomy  Splenectomy  Hernia repair  Cataracts removed with lenses  Blepharoplasty, OU  06/10/2020- transbronchial needle aspiration of lingular lung nodule ([place]).  Carcinoma, favor squamous cell carcinoma.    ADULT ILLNESSES:  Patient Active Problem List   Diagnosis Code   • Abnormal Pap smear, low grade squamous intraepithelial lesion (LGSIL) [id number]   • Acute bilateral back pain M54.9   • Adenopathy, cervical R59.0   • Allergic rhinitis J30.9   • Benign neoplasm of adrenal gland D35.00   • Bilateral cataracts H26.9   • Bilateral hearing loss H91.93   • Bilateral impacted cerumen H61.23   • Carpal tunnel syndrome G56.00   • Cervical high risk HPV (human papillomavirus) test positive R87.810   • Diverticulosis of large intestine K57.30   • Encounter for health-related screening Z13.9   • Leiomyoma of uterus, unspecified D25.9   • H/O splenectomy Z90.81   • Malignant  neoplasm of kidney (HCC) C64.9   • Malignant neoplasm of lung (HCC) C34.90   • Metastatic cancer to cervical lymph nodes (HCC) C77.0   • Neck mass R22.1   • Plantar fasciitis M72.2   • Pulmonary nodule R91.1   • Restless legs syndrome (RLS) G25.81   • Malignant neoplasm (HCC) C80.1   • Encounter for care related to Port-a-Cath Z45.2   • History of lung cancer Z85.118   • Metastatic renal cell carcinoma (HCC) C64.9   • Other nonspecific abnormal finding R68.89   • Current every day smoker F17.200   • History of lobectomy of lung Z90.2     SURGERIES:  Past Surgical History:   Procedure Laterality Date   • ABDOMINAL SURGERY     • APPENDECTOMY     •  SECTION     • CHOLECYSTECTOMY     • HERNIA REPAIR     • LUNG REMOVAL, PARTIAL Right    • NEPHRECTOMY Left      HEALTH MAINTENANCE ITEMS:  Health Maintenance Due   Topic Date Due   • DXA SCAN  Never done   • COLORECTAL CANCER SCREENING  Never done   • ZOSTER VACCINE (1 of 2) Never done   • HEPATITIS C SCREENING  Never done   • COVID-19 Vaccine (3 - Moderna risk 4-dose series) 2021   • INFLUENZA VACCINE  2021   • MAMMOGRAM  2021       <no information>  Last Completed Colonoscopy     This patient has no relevant Health Maintenance data.        Immunization History   Administered Date(s) Administered   • COVID-19 (MODERNA) 1st, 2nd, 3rd Dose Only 2021, 2021   • FLUAD TRI 65YR+ 2019   • Fluzone High Dose =>65 Years ([employer] ONLY) 2019   • H1N1 Inj 2009   • Influenza, Unspecified 10/09/2012, 10/08/2013, 10/08/2013, 10/08/2014, 10/12/2015, 10/13/2016   • Pneumococcal Conjugate 13-Valent (PCV13) 09/15/2016   • Pneumococcal Polysaccharide (PPSV23) 2018   • Tdap 2018     Last Completed Mammogram     This patient has no relevant Health Maintenance data.            FAMILY HISTORY:  Family History   Problem Relation Age of Onset   • No Known Problems Mother    • No Known Problems Father    • Cancer Sister    • No Known  "Problems Maternal Grandmother    • No Known Problems Maternal Grandfather    • No Known Problems Paternal Grandmother    • No Known Problems Paternal Grandfather    • No Known Problems Brother    • No Known Problems Maternal Aunt    • No Known Problems Maternal Uncle    • No Known Problems Paternal Aunt    • No Known Problems Paternal Uncle    • No Known Problems Other    • Asthma Neg Hx    • Diabetes Neg Hx    • Emphysema Neg Hx    • Heart failure Neg Hx    • Hypertension Neg Hx      SOCIAL HISTORY:  Social History     Socioeconomic History   • Marital status:    Tobacco Use   • Smoking status: Current Every Day Smoker     Packs/day: 0.75     Years: 55.00     Pack years: 41.25     Types: Cigarettes     Start date: 1966   • Smokeless tobacco: Never Used   • Tobacco comment: half a pack now   Substance and Sexual Activity   • Alcohol use: Not Currently   • Drug use: Never   • Sexual activity: Defer       REVIEW OF SYSTEMS:  Review of Systems   Constitutional: Positive for fatigue (\"tired.\"). Negative for activity change, appetite change, chills, diaphoresis, fever, unexpected weight gain and unexpected weight loss.        Manages all her ADLs, including chores, and driving.  Lives alone. Still works part time at [employer].  Says she is still active and \"up and about all the time\"   HENT: Negative.    Eyes: Negative.    Respiratory: Positive for cough (productive of clear/cream colored/never blood tinged - phlegm) and shortness of breath (Admits to MEHTA, occasional SOB with her routine activities. ). Negative for chest tightness and wheezing.         Is still smoking >/= 1/2 ppd - smoker since age 15   Cardiovascular: Negative.    Gastrointestinal: Negative.    Genitourinary: Negative.    Musculoskeletal: Positive for arthralgias (shoulders, knees, back, left hip, hands). Negative for back pain (Left side radicular symptoms have been quiescent), joint swelling, myalgias and neck pain.   Skin: Negative.  " "  Allergic/Immunologic: Positive for environmental allergies (\"pollen\").   Neurological: Positive for numbness (left hand, \"carpal tunnel\"). Negative for dizziness.   Hematological: Negative.    Psychiatric/Behavioral: Positive for depressed mood (\"on and off.\"). The patient is nervous/anxious.        /70   Pulse 77   Temp 96.9 °F (36.1 °C)   Resp 16   Ht 152.4 cm (60\")   Wt 67.9 kg (149 lb 11.2 oz)   SpO2 96%   Breastfeeding No   BMI 29.24 kg/m²  Body surface area is 1.65 meters squared.  Pain Score    03/08/22 1129   PainSc: 0-No pain       Physical Exam:  Physical Exam   Constitutional: She is oriented to person, place, and time. No distress.   Pleasant, heavy set, modestly kept elderly female.  Ambulatory.  ECOG 1.      She has lost 3 pounds (had gained 10 pounds at her 3 prior visits) since her last visit.   HENT:   Head: Normocephalic and atraumatic.   Wearing a surgical mask today    Scarring of the left supraclavicular area from prior neck surgery   Eyes: Pupils are equal, round, and reactive to light. Conjunctivae are normal. No scleral icterus.   Neck: No JVD present. No tracheal deviation present. No thyromegaly present.   Cardiovascular: Normal rate and regular rhythm. Exam reveals no friction rub.   Pulmonary/Chest: No stridor. She has wheezes (soft, scattered). She has no rales.   Distant breath sounds    Healed thoracotomy incisions   Abdominal: Soft. Bowel sounds are normal. She exhibits no distension and no mass. There is no abdominal tenderness. There is no rebound and no guarding.   Musculoskeletal: Normal range of motion. Deformity (Osteoarthritic changes of the hands) present.   Lymphadenopathy:     She has no cervical adenopathy.   Neurological: She is alert and oriented to person, place, and time. No cranial nerve deficit.   Skin: Skin is warm and dry. No rash noted. No erythema.   Psychiatric: Her behavior is normal. Thought content normal.   Vitals " reviewed.      Assessment:  1.   Squamous cell lung carcinoma            Tumor stage: pTNM IA3 (pT1c,pN0, M0)            PET Tumor [place]: 1.7 cm nodule in the lingula.  No other PET evidence for metastatic disease.            Complications of tumor: None            Tumor status: Untreated.              -- Radiographically progressing (has grown to 1.7cm on chest CT, 06/01/2020 from 1.5 cm on chest CT, 02/19/2020).            -- 08/25/2020-robotic lingular segmentectomy (Dr. Moshe Hand).  Diagnosis: Squamous cell carcinoma, 2.2 cm in greatest extent; negative pleural invasion; surgical margins negative for tumor; 1 benign lymph node (1/14). pTNM: pT1c, pN0            --01/19/2021-CT chest.  Status post bilateral lung surgery with postop changes.  No CT evidence of malignancy or acute cardiopulmonary process.  Mild induration of the left axilla with small lymph nodes, significance uncertain.  Consider adenitis.  Stable low-attenuation right adrenal mass.  Changes from left nephrectomy.           --07/19/2021-CT chest.  Postsurgical changes.  No CT evidence of developing malignancy.  No acute cardiopulmonary process.           --12/27/2021-CT chest-stable treatment-related changes.  No evidence of new and/or recurrent disease.  Advanced coronary artery atheromatous calcification               2.   History of non-small cell lung cancer in the right upper lobe, status post right upper lobectomy, 2004.  No details  3.   Metastatic urothelial carcinoma with positive supraclavicular lymph node biopsy and left nephrectomy, 2009.  Details unclear but apparently received chemotherapy and radiation.  4.   Tobacco use, 55+ years cigarette smoking  5.   Chronic kidney disease, stage III.    --GFR 61 mL/min, 03/09/2022 (prior: 51-57 mL/min)  6.   Abnormal CEA.  Tobacco smoker?  Needs follow-up.  --7.69, 01/27/2022 (prior: 8.54-9.53)  7.   Anxiety.  Requests alprazolam  8.   Low-grade neutrophilic leukocytosis  --Resolved.   WBC 9.82, 03/08/2022.  Reactive to tobacco smoking associated bronchitis?  9.   Nonfasting hyperglycemia.      Recommendations:  1.   Apprised of labs, 01/27/2022 and 03/08/2022 with normal CBC, hyperglycemia (nonfasting) otherwise normal CMP, CEA 7.69 (prior: 8.54-9.53).  2.   Apprised of chest CT, 12/27/2021 (above).  No evidence of malignancy.    3.   Request pathology at [facility] to send out biopsy specimen on 6/10/2020: EGFR, ALK, ROS 1, PDL1, BRAF-4th request.  4.   Review NCCN guidelines version 6.2020 non-small cell lung cancer-stage IA (peripheral T1 abc, N0).  If operable: Surgical exploration and resection plus mediastinal lymph node dissection/sampling.  Adjuvant chemotherapy only if node positive.  If margins negative (R0)-observation.  Surveillance- H&P and chest CT +/- contrast every 6 months for 2 to 3 years then H&P and low-dose noncontrast enhanced chest CT annually..  5.   Schedule CT chest without contrast in [facility] weeks at [facility].  Compare to prior studies.   6.   Return to office in 24 weeks with (specify fasting) pre-office CMP, CBC with differential, and CEA.    I spent ~30 minutes caring for Jennie on this date of service. This time includes time spent by me in the following activities: preparing for the visit, reviewing tests, performing a medically appropriate examination and/or evaluation, counseling and educating the patient/family/caregiver, ordering medications, tests, or procedures and documenting information in the medical record

## 2022-03-08 ENCOUNTER — OFFICE VISIT (OUTPATIENT)
Dept: ONCOLOGY | Facility: CLINIC | Age: 72
End: 2022-03-08

## 2022-03-08 ENCOUNTER — LAB (OUTPATIENT)
Dept: LAB | Facility: HOSPITAL | Age: 72
End: 2022-03-08

## 2022-03-08 VITALS
HEART RATE: 77 BPM | TEMPERATURE: 96.9 F | OXYGEN SATURATION: 96 % | SYSTOLIC BLOOD PRESSURE: 122 MMHG | RESPIRATION RATE: 16 BRPM | BODY MASS INDEX: 29.39 KG/M2 | HEIGHT: 60 IN | WEIGHT: 149.7 LBS | DIASTOLIC BLOOD PRESSURE: 70 MMHG

## 2022-03-08 DIAGNOSIS — C34.92 MALIGNANT NEOPLASM OF LEFT LUNG, UNSPECIFIED PART OF LUNG: Primary | ICD-10-CM

## 2022-03-08 DIAGNOSIS — C80.1 MALIGNANT NEOPLASM: Primary | ICD-10-CM

## 2022-03-08 LAB
ALBUMIN SERPL-MCNC: 4.4 G/DL (ref 3.5–5.2)
ALBUMIN/GLOB SERPL: 1.8 G/DL
ALP SERPL-CCNC: 85 U/L (ref 39–117)
ALT SERPL W P-5'-P-CCNC: 18 U/L (ref 1–33)
ANION GAP SERPL CALCULATED.3IONS-SCNC: 11 MMOL/L (ref 5–15)
AST SERPL-CCNC: 18 U/L (ref 1–32)
BASOPHILS # BLD AUTO: 0.07 10*3/MM3 (ref 0–0.2)
BASOPHILS NFR BLD AUTO: 0.7 % (ref 0–1.5)
BILIRUB SERPL-MCNC: 0.2 MG/DL (ref 0–1.2)
BUN SERPL-MCNC: 17 MG/DL (ref 8–23)
BUN/CREAT SERPL: 17.3 (ref 7–25)
CALCIUM SPEC-SCNC: 9.5 MG/DL (ref 8.6–10.5)
CHLORIDE SERPL-SCNC: 103 MMOL/L (ref 98–107)
CO2 SERPL-SCNC: 27 MMOL/L (ref 22–29)
CREAT SERPL-MCNC: 0.98 MG/DL (ref 0.57–1)
DEPRECATED RDW RBC AUTO: 51.1 FL (ref 37–54)
EGFRCR SERPLBLD CKD-EPI 2021: 61.5 ML/MIN/1.73
EOSINOPHIL # BLD AUTO: 0.23 10*3/MM3 (ref 0–0.4)
EOSINOPHIL NFR BLD AUTO: 2.3 % (ref 0.3–6.2)
ERYTHROCYTE [DISTWIDTH] IN BLOOD BY AUTOMATED COUNT: 14.4 % (ref 12.3–15.4)
GLOBULIN UR ELPH-MCNC: 2.5 GM/DL
GLUCOSE SERPL-MCNC: 139 MG/DL (ref 65–99)
HCT VFR BLD AUTO: 40.9 % (ref 34–46.6)
HGB BLD-MCNC: 13.9 G/DL (ref 12–15.9)
HOLD SPECIMEN: NORMAL
IMM GRANULOCYTES # BLD AUTO: 0.05 10*3/MM3 (ref 0–0.05)
IMM GRANULOCYTES NFR BLD AUTO: 0.5 % (ref 0–0.5)
LYMPHOCYTES # BLD AUTO: 2.34 10*3/MM3 (ref 0.7–3.1)
LYMPHOCYTES NFR BLD AUTO: 23.8 % (ref 19.6–45.3)
MCH RBC QN AUTO: 32.8 PG (ref 26.6–33)
MCHC RBC AUTO-ENTMCNC: 34 G/DL (ref 31.5–35.7)
MCV RBC AUTO: 96.5 FL (ref 79–97)
MONOCYTES # BLD AUTO: 0.79 10*3/MM3 (ref 0.1–0.9)
MONOCYTES NFR BLD AUTO: 8 % (ref 5–12)
NEUTROPHILS NFR BLD AUTO: 6.34 10*3/MM3 (ref 1.7–7)
NEUTROPHILS NFR BLD AUTO: 64.7 % (ref 42.7–76)
NRBC BLD AUTO-RTO: 0 /100 WBC (ref 0–0.2)
PLATELET # BLD AUTO: 400 10*3/MM3 (ref 140–450)
PMV BLD AUTO: 9.7 FL (ref 6–12)
POTASSIUM SERPL-SCNC: 4.4 MMOL/L (ref 3.5–5.2)
PROT SERPL-MCNC: 6.9 G/DL (ref 6–8.5)
RBC # BLD AUTO: 4.24 10*6/MM3 (ref 3.77–5.28)
SODIUM SERPL-SCNC: 141 MMOL/L (ref 136–145)
WBC NRBC COR # BLD: 9.82 10*3/MM3 (ref 3.4–10.8)

## 2022-03-08 PROCEDURE — 99214 OFFICE O/P EST MOD 30 MIN: CPT | Performed by: INTERNAL MEDICINE

## 2022-03-08 PROCEDURE — 36415 COLL VENOUS BLD VENIPUNCTURE: CPT

## 2022-03-08 PROCEDURE — 80053 COMPREHEN METABOLIC PANEL: CPT

## 2022-03-08 PROCEDURE — 85025 COMPLETE CBC W/AUTO DIFF WBC: CPT

## 2022-09-01 ENCOUNTER — LAB (OUTPATIENT)
Dept: LAB | Facility: HOSPITAL | Age: 72
End: 2022-09-01

## 2022-09-01 ENCOUNTER — HOSPITAL ENCOUNTER (OUTPATIENT)
Dept: CT IMAGING | Facility: HOSPITAL | Age: 72
Discharge: HOME | End: 2022-09-01
Admitting: INTERNAL MEDICINE

## 2022-09-01 DIAGNOSIS — C34.92 MALIGNANT NEOPLASM OF LEFT LUNG, UNSPECIFIED PART OF LUNG: ICD-10-CM

## 2022-09-01 LAB
ALBUMIN SERPL-MCNC: 4.5 G/DL (ref 3.5–5.2)
ALBUMIN/GLOB SERPL: 1.7 G/DL
ALP SERPL-CCNC: 83 U/L (ref 39–117)
ALT SERPL W P-5'-P-CCNC: 14 U/L (ref 1–33)
ANION GAP SERPL CALCULATED.3IONS-SCNC: 7 MMOL/L (ref 5–15)
AST SERPL-CCNC: 17 U/L (ref 1–32)
BASOPHILS # BLD AUTO: 0.07 10*3/MM3 (ref 0–0.2)
BASOPHILS NFR BLD AUTO: 0.7 % (ref 0–1.5)
BILIRUB SERPL-MCNC: 0.3 MG/DL (ref 0–1.2)
BUN SERPL-MCNC: 20 MG/DL (ref 8–23)
BUN/CREAT SERPL: 18.3 (ref 7–25)
CALCIUM SPEC-SCNC: 9.3 MG/DL (ref 8.6–10.5)
CEA SERPL-MCNC: 8.99 NG/ML
CHLORIDE SERPL-SCNC: 102 MMOL/L (ref 98–107)
CO2 SERPL-SCNC: 28 MMOL/L (ref 22–29)
CREAT SERPL-MCNC: 1.09 MG/DL (ref 0.57–1)
DEPRECATED RDW RBC AUTO: 48.2 FL (ref 37–54)
EGFRCR SERPLBLD CKD-EPI 2021: 54.1 ML/MIN/1.73
EOSINOPHIL # BLD AUTO: 0.23 10*3/MM3 (ref 0–0.4)
EOSINOPHIL NFR BLD AUTO: 2.2 % (ref 0.3–6.2)
ERYTHROCYTE [DISTWIDTH] IN BLOOD BY AUTOMATED COUNT: 13.9 % (ref 12.3–15.4)
GLOBULIN UR ELPH-MCNC: 2.7 GM/DL
GLUCOSE SERPL-MCNC: 129 MG/DL (ref 65–99)
HCT VFR BLD AUTO: 41.8 % (ref 34–46.6)
HGB BLD-MCNC: 13.8 G/DL (ref 12–15.9)
IMM GRANULOCYTES # BLD AUTO: 0.03 10*3/MM3 (ref 0–0.05)
IMM GRANULOCYTES NFR BLD AUTO: 0.3 % (ref 0–0.5)
LYMPHOCYTES # BLD AUTO: 2.28 10*3/MM3 (ref 0.7–3.1)
LYMPHOCYTES NFR BLD AUTO: 21.6 % (ref 19.6–45.3)
MCH RBC QN AUTO: 31.3 PG (ref 26.6–33)
MCHC RBC AUTO-ENTMCNC: 33 G/DL (ref 31.5–35.7)
MCV RBC AUTO: 94.8 FL (ref 79–97)
MONOCYTES # BLD AUTO: 0.85 10*3/MM3 (ref 0.1–0.9)
MONOCYTES NFR BLD AUTO: 8 % (ref 5–12)
NEUTROPHILS NFR BLD AUTO: 67.2 % (ref 42.7–76)
NEUTROPHILS NFR BLD AUTO: 7.12 10*3/MM3 (ref 1.7–7)
NRBC BLD AUTO-RTO: 0 /100 WBC (ref 0–0.2)
PLATELET # BLD AUTO: 417 10*3/MM3 (ref 140–450)
PMV BLD AUTO: 9.5 FL (ref 6–12)
POTASSIUM SERPL-SCNC: 4.4 MMOL/L (ref 3.5–5.2)
PROT SERPL-MCNC: 7.2 G/DL (ref 6–8.5)
RBC # BLD AUTO: 4.41 10*6/MM3 (ref 3.77–5.28)
SODIUM SERPL-SCNC: 137 MMOL/L (ref 136–145)
WBC NRBC COR # BLD: 10.58 10*3/MM3 (ref 3.4–10.8)

## 2022-09-01 PROCEDURE — 82378 CARCINOEMBRYONIC ANTIGEN: CPT

## 2022-09-01 PROCEDURE — 71250 CT THORAX DX C-: CPT

## 2022-09-01 PROCEDURE — 36415 COLL VENOUS BLD VENIPUNCTURE: CPT

## 2022-09-01 PROCEDURE — 80053 COMPREHEN METABOLIC PANEL: CPT

## 2022-09-01 PROCEDURE — 85025 COMPLETE CBC W/AUTO DIFF WBC: CPT

## 2022-09-01 NOTE — PROGRESS NOTES
[facility] HEMATOLOGY AND ONCOLOGY  [address]  [facility] [address] [phone]    Patient Name: Jennie Molina  Encounter Date: 09/08/2022  YOB: 1950  Patient Number: [id number]      REASON FOR VISIT: Jennie Molina is a 71-year-old female who returns in follow-up of 2 lung cancers: Squamous cell carcinoma in the right upper lobe, status post partial resection, 2004. On 08/25/2020-underwent robotic lingular segmentectomy for squamous cell carcinoma, 2.2 cm in greatest extent; negative pleural invasion; surgical margins negative for tumor; 1 benign lymph node (1/14). pTNM: pT1c, pN0.  She is here alone.    I have reviewed the HPI and verified with the patient the accuracy of it. No changes to interval history since the information was documented. Adelfo Grider MD 09/08/22     DIAGNOSTIC ABNORMALITIES/PREVIOUS INTERVENTIONS:         1.   History of active tobacco use (55+ years smoking history), urothelial carcinoma with positive supraclavicular lymph node biopsy, and left nephrectomy, 2009.  Had received chemotherapy and radiation.          -    2004 - History of right lung cancer:   · Station 7 node, biopsy:    ? Negative for carcinoma.  · Station 4, biopsy:    ? Negative for carcinoma.  · Lung, right upper lobe, mass, excisional biopsy:   ? Poorly differentiated non-small cell carcinoma (see microscopic description).  · Lymph node, station 7, resection:  ? Thirteen lymph nodes, negative for tumor (0/13).  · Lymph node, station 4R, resection:  ? Eighteen lymph nodes, negative for tumor (0/18).  · Lymph node, station 11R, resection:  ? Four lymph nodes, negative for tumor (0/4).  · Rib, 5th, resection:    ? Negative for carcinoma.  · Lymph node, station 10R, resection:  ? One lymph node, negative for tumor (0/1).  · Right upper lobe mass, excisional biopsy:  ? Poorly differentiated non small cell carcinoma; see Tumor  Characteristics and comment.  ? Tumor extends to the inked pleural surface.  · Right upper lobe, lobectomy:  ? Focal hemorrhage and collapse.  ? Negative for carcinoma.                     -    2009 - History of urothelial carcinoma:   10/21/2009:  Left kidney, radial nephrectomy:  · High grade urothelial carcinoma with extensive necrosis.  · Tumor arises in renal pelvis (in situ carcinoma) and invades through kidney to perinephric fat.  · Lymphovascular invasion: Not identified.  · Margins: Negative.  · One lymph node, negative for malignancy (0/1).  Left hilar lymph node:  · One lymph node, negative for malignancy (0/1).  Spleen:  · Spleen without significant pathologic change           -    11/13/2009:  Left supraclavicular lymph node:  Metastatic high grade carcinoma involving one of four lymph nodes (1/4), see comment.           2.   Surveillance CTs in October 2019 was found to have a new 5 mm nodule in the left lingula.         3.   02/19/2020-CT chest.  Impression: Largest 1.5 cm nodule at the lingula and new 5 mm pulmonary nodule (reported on outside scan not available).  Changes of right upper lobe resection.  Emphysema.  Right hydronephrosis versus extrarenal pelvis (outside imaging report 9/18/2019 indicates extrarenal pelvis but images not available for comparison).  Right adrenal nodule, incompletely characterized.  Prior splenectomy.  Possible left adrenalectomy and nephrectomy.         4.   03/03/2020-PET scan.  Hypermetabolic 1.5 cm pulmonary nodule in the lingula, highly suspicious for metastatic disease or primary lung neoplasm.  No other evidence of metastatic disease or abnormal FDG uptake.  2 cm left adrenal gland nodule, most compatible with adenoma.  6 mm pulmonary nodule at the right lung base in a background of scarring.  Non-FDG avid but too small to accurately characterize by PET scan.         5.   03/27/2020- seen for medical oncology by Dr. Ventura who referred her to Dr. Enriquez of  "pulmonary in assessment of tissue biopsy.         6.   05/27/2020- seen by Dr. Enriquez of pulmonary in assessment of the left upper lobe nodule.  Review of records from [facility] and [facility] indicate prior history of lung cancer on the right side resected and more recently urothelial cancer of the  system.  All have been treated.  More recent surveillance CT identified an asymptomatic lesion in the left upper lobe adjacent to the fissure.  PET scan showed hypermetabolism in this lesion.  Patient was referred to [facility] for possible navigational bronchoscopy/biopsy of the lesion that appears to be in the lingula.  Patient says she has received \"the maximum\" radiation for her lifetime.         7.   06/01/2020-CT chest.  Comparison 02/19/2020, 03/20/2020.  Impression: Slight increase in size of lingular nodule measuring 1.7 cm (previously 1.5 cm) concerning for malignancy.  Decreased size of right lower lobe pulmonary nodule now measures 0.3 cm (from 0.6 cm).  Stable postoperative changes in the right hilum.  Left nephrectomy.  Stable right adrenal low-density lesion, favored to represent an adenoma.         8.   06/10/2020- [facility] (Dr. Hare) bronchoscopy with biopsy.  Pathology: LUNG, LINGULA, TRANSBRONCHIAL BIOPSY:   · AT LEAST SQUAMOUS CELL CARCINOMA IN SITU.  · Immunohistochemistry demonstrates the neoplastic cells to be positive for p40 and negative for GATA3 and CK7, consistent with squamous differentiation.  LUNG NODULE, LINGULA, TRANSBRONCHIAL NEEDLE ASPIRATION WITH CELL BLOCK:   · INVOLVED BY CARINOMA, FAVOR SQUAMOUS CELL CARCINOMA (SEE COMMENT).  Comment: Immunohistochemical stains performed with appropriate controls show that the tumor cells are positive for p40 and negative for ELBA-3 and TTF-1. While this immunophenotype and cytomorphology can be seen in both a primary squamous cell carcinoma of the lung and metastatic " urothelial carcinoma, the lack of reactivity for ELBA-3 and the presence of carcinoma in situ in the corresponding surgical biopsy [id number] would favor a primary lung carcinoma.            9.   06/10/2020-hemoglobin 12.6, hematocrit 37, MCV 95, platelets 346,000, WBC 10.3.  BMP with calcium of 8.1 otherwise normal with creatinine 0.99.      10.   07/20/2020- MRI brain w and wo contrast: No metastatic disease.  Mild chronic microvascular changes.      11. 08/25/2020- robotic lingular segmentectomy (Dr. Moshe Hand).  Diagnosis: A.lung, lingular segmentectomy: Squamous cell carcinoma, 2.2 cm in greatest extent; negative for pleural invasion; surgical margins negative for tumor; 1 benign lymph node (see synoptic).  B.lymph node, level 9L excision: 1 benign lymph node.  C.lymph node, level 8L, excision: 2 benign lymph nodes.  D.lymph node, 7L, excision: 1 benign lymph node.  E.lymph node, level 5, excision: 3 benign lymph nodes.  F.lymph node, 12 L, excision: 1 benign lymph node.  G.lymph node, level 11 L, excision: 4 benign lymph nodes.  H.lymph node, 13 L, excision: At least one benign lymph node.  I.bronchial staple line: Unremarkable bronchus; negative for tumor.  J.lung, final parenchymal margin: Unremarkable lung parenchyma.  Synoptic: Lung specimen-procedure: Segmentectomy.  Specimen laterality: Left.  Spread through airspaces (STS): Not identified.  Total tumor size (size of entire tumor): Greatest dimension 2.2 cm.  Size of invasive component: 2.2 cm.  Tumor focality: Single focus.  Visceropleural invasion: Not identified.  Direct invasion of adjacent structures: No adjacent structures present.  Treatment effect: No known presurgical therapy.  Lymphovascular invasion: Not identified.  Margins: Uninvolved by tumor.  Lymph nodes: Number of lymph nodes involved: 0.  Number of lymph nodes examined: 14.  Lev stations examined: 7: Subcarinal, 5: Sub-aortic/aortopulmonary/AP window, 8L: Paraesophageal, 9L,  pulmonary ligament, 11 L, interlobar, 12 L, lobar 13 L segmental.  Pathologic stage classification (P TNM, AJCC eighth edition).  Primary tumor (pT): pT1c, regional lymph nodes (pN): pN0.  Likely insufficient material for molecular testing.    LABS    Lab Results - Last 18 Months   Lab Units 09/01/22  1034 03/08/22  1058 01/27/22  1127 07/26/21  1403 07/19/21  1321   HEMOGLOBIN g/dL 13.8 13.9 13.8 13.5 13.6   HEMATOCRIT % 41.8 40.9 41.8 39.3 39.1   MCV fL 94.8 96.5 95.7 93.6 92.7   WBC 10*3/mm3 10.58 9.82 9.15 12.28* 10.28   RDW % 13.9 14.4 14.3 13.9 13.9   MPV fL 9.5 9.7 10.5 10.1 10.1   PLATELETS 10*3/mm3 417 400 354 390 390   IMM GRAN % % 0.3 0.5 0.2 0.2 0.2   NEUTROS ABS 10*3/mm3 7.12* 6.34 5.83 7.19* 6.06   LYMPHS ABS 10*3/mm3 2.28 2.34 2.25 3.88* 3.20*   MONOS ABS 10*3/mm3 0.85 0.79 0.66 0.89 0.67   EOS ABS 10*3/mm3 0.23 0.23 0.32 0.23 0.27   BASOS ABS 10*3/mm3 0.07 0.07 0.07 0.06 0.06   IMMATURE GRANS (ABS) 10*3/mm3 0.03 0.05 0.02 0.03 0.02   NRBC /100 WBC 0.0 0.0 0.0 0.0 0.0       Lab Results - Last 18 Months   Lab Units 09/01/22  1034 03/08/22  1058 07/26/21  1403   GLUCOSE mg/dL 129* 139* 132*   SODIUM mmol/L 137 141 137   POTASSIUM mmol/L 4.4 4.4 4.2   CO2 mmol/L 28.0 27.0 24.0   CHLORIDE mmol/L 102 103 102   ANION GAP mmol/L 7.0 11.0 11.0   CREATININE mg/dL 1.09* 0.98 1.04*   BUN mg/dL 20 17 19   BUN / CREAT RATIO  18.3 17.3 18.3   CALCIUM mg/dL 9.3 9.5 9.6   EGFR IF NONAFRICN AM mL/min/1.73  --   --  52*   ALK PHOS U/L 83 85 92   TOTAL PROTEIN g/dL 7.2 6.9 7.1   ALT (SGPT) U/L 14 18 14   AST (SGOT) U/L 17 18 16   BILIRUBIN mg/dL 0.3 0.2 0.2   ALBUMIN g/dL 4.50 4.40 4.70   GLOBULIN gm/dL 2.7 2.5 2.4         PAST MEDICAL HISTORY:  ALLERGIES:  Allergies   Allergen Reactions   • Iodides Hives and Itching     HIVES AND ITCHING POST CT STUDY ON 9/4/15     2-24-16 WITH PREMEDICATION PT STILL HAD A REACTION WITH HIVES AND THROAT TIGHTENING   HIVES AND ITCHING POST CT STUDY ON 9/4/15     2-24-16 WITH  PREMEDICATION PT STILL HAD A REACTION WITH HIVES AND THROAT TIGHTENING      • Iodinated Diagnostic Agents Hives     CURRENT MEDICATIONS:  Outpatient Encounter Medications as of 2022   Medication Sig Dispense Refill   • ALPRAZolam (XANAX) 0.5 MG tablet Take 0.5 mg by mouth At Night As Needed.     • melatonin 3 MG tablet Take 3 mg by mouth Every Night.     • naproxen sodium (ALEVE) 220 MG tablet Take 220 mg by mouth 2 (Two) Times a Day As Needed.     • SUPER B COMPLEX/C PO Take  by mouth Daily.     • traZODone (DESYREL) 50 MG tablet Take 50 mg by mouth Daily As Needed.     • [DISCONTINUED] gabapentin (NEURONTIN) 100 MG capsule Take 100 mg by mouth 3 (Three) Times a Day.     • [DISCONTINUED] HYDROcodone-acetaminophen (NORCO) 5-325 MG per tablet Take 1 tablet by mouth 2 (two) times a day. 20 tablet 0     No facility-administered encounter medications on file as of 2022.       Adult illnesses:  Left urothelial carcinoma, metastatic with positive supraclavicular lymph node biopsy, 2009.  Lung cancer  Hives after CT contrast  Active tobacco use (55+ years)    Past surgeries:  Port-A-Cath placement, 3/27/2020   section  Left nephrectomy/adrenalectomy  Right upper lobe lung resection,   Appendectomy  Cholecystectomy  Splenectomy  Hernia repair  Cataracts removed with lenses  Blepharoplasty, OU  06/10/2020- transbronchial needle aspiration of lingular lung nodule ([facility]).  Carcinoma, favor squamous cell carcinoma.    ADULT ILLNESSES:  Patient Active Problem List   Diagnosis Code   • Abnormal Pap smear, low grade squamous intraepithelial lesion (LGSIL) [id number]   • Acute bilateral back pain M54.9   • Adenopathy, cervical R59.0   • Allergic rhinitis J30.9   • Benign neoplasm of adrenal gland D35.00   • Bilateral cataracts H26.9   • Bilateral hearing loss H91.93   • Bilateral impacted cerumen H61.23   • Carpal tunnel syndrome G56.00   • Cervical high risk HPV (human papillomavirus) test positive  R87.810   • Diverticulosis of large intestine K57.30   • Encounter for health-related screening Z13.9   • Leiomyoma of uterus, unspecified D25.9   • H/O splenectomy Z90.81   • Malignant neoplasm of kidney (HCC) C64.9   • Malignant neoplasm of lung (HCC) C34.90   • Metastatic cancer to cervical lymph nodes (HCC) C77.0   • Neck mass R22.1   • Plantar fasciitis M72.2   • Pulmonary nodule R91.1   • Restless legs syndrome (RLS) G25.81   • Malignant neoplasm (HCC) C80.1   • Encounter for care related to Port-a-Cath Z45.2   • History of lung cancer Z85.118   • Metastatic renal cell carcinoma (HCC) C64.9   • Other nonspecific abnormal finding R68.89   • Current every day smoker F17.200   • History of lobectomy of lung Z90.2     SURGERIES:  Past Surgical History:   Procedure Laterality Date   • ABDOMINAL SURGERY     • APPENDECTOMY     •  SECTION     • CHOLECYSTECTOMY     • HERNIA REPAIR     • LUNG REMOVAL, PARTIAL Right    • NEPHRECTOMY Left      HEALTH MAINTENANCE ITEMS:  Health Maintenance Due   Topic Date Due   • DXA SCAN  Never done   • COLORECTAL CANCER SCREENING  Never done   • ZOSTER VACCINE (1 of 2) Never done   • HEPATITIS C SCREENING  Never done   • MAMMOGRAM  2021   • COVID-19 Vaccine (3 - Moderna risk series) 2022   • ANNUAL WELLNESS VISIT  2022       <no information>  Last Completed Colonoscopy     This patient has no relevant Health Maintenance data.        Immunization History   Administered Date(s) Administered   • COVID-19 (MODERNA) 1st, 2nd, 3rd Dose Only 2021, 2021   • FLUAD TRI 65YR+ 2019   • Fluzone High Dose =>65 Years ([employer] ONLY) 2019   • H1N1 Inj 2009   • Influenza, Unspecified 10/09/2012, 10/08/2013, 10/08/2013, 10/08/2014, 10/12/2015, 10/13/2016   • Pneumococcal Conjugate 13-Valent (PCV13) 09/15/2016   • Pneumococcal Polysaccharide (PPSV23) 2018   • Tdap 2018     Last Completed Mammogram     This patient has no relevant Health  "Maintenance data.            FAMILY HISTORY:  Family History   Problem Relation Age of Onset   • No Known Problems Mother    • No Known Problems Father    • Cancer Sister    • No Known Problems Maternal Grandmother    • No Known Problems Maternal Grandfather    • No Known Problems Paternal Grandmother    • No Known Problems Paternal Grandfather    • No Known Problems Brother    • No Known Problems Maternal Aunt    • No Known Problems Maternal Uncle    • No Known Problems Paternal Aunt    • No Known Problems Paternal Uncle    • No Known Problems Other    • Asthma Neg Hx    • Diabetes Neg Hx    • Emphysema Neg Hx    • Heart failure Neg Hx    • Hypertension Neg Hx      SOCIAL HISTORY:  Social History     Socioeconomic History   • Marital status:    Tobacco Use   • Smoking status: Current Every Day Smoker     Packs/day: 0.75     Years: 55.00     Pack years: 41.25     Types: Cigarettes     Start date: 1966   • Smokeless tobacco: Never Used   • Tobacco comment: half a pack now   Substance and Sexual Activity   • Alcohol use: Not Currently   • Drug use: Never   • Sexual activity: Defer       REVIEW OF SYSTEMS:  Review of Systems   Constitutional: Positive for fatigue (\"tired.\"). Negative for activity change, appetite change, chills, diaphoresis, fever, unexpected weight gain and unexpected weight loss.        Manages all her ADLs, including chores, and driving.  Lives alone. Still works part time at [employer].  Says she is still active and \"am still up and about all the time\"   HENT: Negative.    Eyes: Negative.    Respiratory: Positive for cough (productive of clear/cream colored/never blood tinged - phlegm) and shortness of breath (Admits to MEHTA, occasional SOB with her routine activities. ). Negative for chest tightness and wheezing.         Is still smoking >/= 1/2 ppd - smoker since age 15   Cardiovascular: Negative.    Gastrointestinal: Negative.    Genitourinary: Negative.    Musculoskeletal: Positive for " "arthralgias (shoulders, knees, back, left hip, hands). Negative for back pain (Left side radicular symptoms have been quiescent), joint swelling, myalgias and neck pain.   Skin: Negative.    Allergic/Immunologic: Positive for environmental allergies (\"pollen\").   Neurological: Positive for numbness (left hand, \"carpal tunnel\"). Negative for dizziness.   Hematological: Negative.    Psychiatric/Behavioral: Positive for depressed mood (\"on and off.\"). The patient is nervous/anxious.        /90   Pulse 71   Temp 97.6 °F (36.4 °C) (Temporal)   Resp 16   Ht 152.4 cm (60\")   Wt 66.4 kg (146 lb 4.8 oz)   SpO2 98%   Breastfeeding No   BMI 28.57 kg/m²  Body surface area is 1.63 meters squared.  Pain Score    09/08/22 1036   PainSc: 0-No pain       Physical Exam:  Physical Exam   Constitutional: She is oriented to person, place, and time. No distress.   Pleasant, heavy set, modestly kept elderly female.  Ambulatory.  ECOG 1.      She has lost 3 pounds (in addition to 3 pounds at her prior visit-had gained 10 pounds at her 3 visits prior to that) since her last visit.   HENT:   Head: Normocephalic and atraumatic.   Wearing a surgical mask today    Scarring of the left supraclavicular area from prior neck surgery   Eyes: Pupils are equal, round, and reactive to light. Conjunctivae are normal. No scleral icterus.   Neck: No JVD present. No tracheal deviation present. No thyromegaly present.   Cardiovascular: Normal rate and regular rhythm. Exam reveals no friction rub.   Pulmonary/Chest: No stridor. She has wheezes (soft, scattered). She has no rales.   Distant breath sounds    Healed thoracotomy incisions   Abdominal: Soft. Bowel sounds are normal. She exhibits no distension and no mass. There is no abdominal tenderness. There is no rebound and no guarding.   Musculoskeletal: Normal range of motion. Deformity (Osteoarthritic changes of the hands) present.   Lymphadenopathy:     She has no cervical adenopathy. "   Neurological: She is alert and oriented to person, place, and time. No cranial nerve deficit.   Skin: Skin is warm and dry. No rash noted. No erythema.   Psychiatric: Her behavior is normal. Thought content normal.   Vitals reviewed.      Assessment:  1.   Squamous cell lung carcinoma            Tumor stage: pTNM IA3 (pT1c,pN0, M0)            PET Tumor [facility]: 1.7 cm nodule in the lingula.  No other PET evidence for metastatic disease.            Complications of tumor: None            Tumor status: Untreated.              -- Radiographically progressing (has grown to 1.7cm on chest CT, 06/01/2020 from 1.5 cm on chest CT, 02/19/2020).            -- 08/25/2020-robotic lingular segmentectomy (Dr. Moshe Hand).  Diagnosis: Squamous cell carcinoma, 2.2 cm in greatest extent; negative pleural invasion; surgical margins negative for tumor; 1 benign lymph node (1/14). pTNM: pT1c, pN0            --01/19/2021-CT chest.  Status post bilateral lung surgery with postop changes.  No CT evidence of malignancy or acute cardiopulmonary process.  Mild induration of the left axilla with small lymph nodes, significance uncertain.  Consider adenitis.  Stable low-attenuation right adrenal mass.  Changes from left nephrectomy.           --07/19/2021-CT chest.  Postsurgical changes.  No CT evidence of developing malignancy.  No acute cardiopulmonary process.           --12/27/2021-CT chest-stable treatment-related changes.  No evidence of new and/or recurrent disease.  Advanced coronary artery atheromatous calcification.           --09/01/2022- CT chest-no new or enlarging pulmonary nodules.  No evidence of new and/or recurrent disease.  Advanced coronary atherosclerosis and postoperative changes to the chest.             2.   History of non-small cell lung cancer in the right upper lobe, status post right upper lobectomy, 2004.  No details  3.   Metastatic urothelial carcinoma with positive supraclavicular lymph node biopsy and left  nephrectomy, 2009.  Details unclear but apparently received chemotherapy and radiation.  4.   Tobacco use, 56+ years cigarette smoking  5.   Chronic kidney disease, stage III.    --GFR 54.1 mL/min, 09/01/2022 (prior: 51-61.5 mL/min)    6.   Abnormal CEA.  Tobacco smoker?  Needs follow-up.  --8.99, 09/01/2022 (prior: 8.54-9.53)    7.   Anxiety. Chronic  8.   Low-grade neutrophilic leukocytosis  --Resolved.  WBC 10.5, 09/01/2022.  Reactive to tobacco smoking associated bronchitis?  9.   Nonfasting hyperglycemia.      Recommendations:  1.   Apprised of labs, 09/01/2022 with normal CBC, hyperglycemia (nonfasting), depressed (stable) GFR, otherwise normal CMP, CEA 8.99 (prior: 7.69-9.53).    2.   Apprised of chest CT, 09/01/2022 (above).  DWIGHT  3.   Request pathology at [facility] to send out biopsy specimen on 6/10/2020: EGFR, ALK, ROS 1, PDL1, BRAF-5th request.  4.   Review NCCN guidelines version 6.2020 non-small cell lung cancer-stage IA (peripheral T1 abc, N0).  If operable: Surgical exploration and resection plus mediastinal lymph node dissection/sampling.  Adjuvant chemotherapy only if node positive.  If margins negative (R0)-observation.  Surveillance- H&P and chest CT +/- contrast every 6 months for 2 to 3 years then H&P and low-dose noncontrast enhanced chest CT annually..  5.   Schedule CT chest without contrast in 23 weeks at [facility].  Compare to prior studies.   6.   Return to office in 24 weeks with (specify fasting) pre-office CMP, CBC with differential, and CEA.    I spent ~33 minutes caring for Jennie on this date of service. This time includes time spent by me in the following activities: preparing for the visit, reviewing tests, performing a medically appropriate examination and/or evaluation, counseling and educating the patient/family/caregiver, ordering medications, tests, or procedures and documenting information in the medical record

## 2022-09-08 ENCOUNTER — OFFICE VISIT (OUTPATIENT)
Dept: ONCOLOGY | Facility: CLINIC | Age: 72
End: 2022-09-08

## 2022-09-08 VITALS
HEART RATE: 71 BPM | HEIGHT: 60 IN | SYSTOLIC BLOOD PRESSURE: 138 MMHG | BODY MASS INDEX: 28.72 KG/M2 | TEMPERATURE: 97.6 F | DIASTOLIC BLOOD PRESSURE: 90 MMHG | OXYGEN SATURATION: 98 % | WEIGHT: 146.3 LBS | RESPIRATION RATE: 16 BRPM

## 2022-09-08 DIAGNOSIS — C34.92 MALIGNANT NEOPLASM OF LEFT LUNG, UNSPECIFIED PART OF LUNG: Primary | ICD-10-CM

## 2022-09-08 PROCEDURE — 99214 OFFICE O/P EST MOD 30 MIN: CPT | Performed by: INTERNAL MEDICINE

## 2022-09-08 RX ORDER — TRAZODONE HYDROCHLORIDE 50 MG/1
50 TABLET ORAL DAILY PRN
COMMUNITY
Start: 2022-06-09

## 2022-09-08 RX ORDER — ALPRAZOLAM 0.5 MG/1
0.5 TABLET ORAL NIGHTLY PRN
COMMUNITY
Start: 2022-08-11

## 2023-02-13 ENCOUNTER — HOSPITAL ENCOUNTER (OUTPATIENT)
Dept: CT IMAGING | Facility: HOSPITAL | Age: 73
Discharge: HOME | End: 2023-02-13
Payer: MEDICARE

## 2023-02-21 ENCOUNTER — HOSPITAL ENCOUNTER (OUTPATIENT)
Dept: CT IMAGING | Facility: HOSPITAL | Age: 73
Discharge: HOME | End: 2023-02-21
Admitting: INTERNAL MEDICINE
Payer: MEDICARE

## 2023-02-21 DIAGNOSIS — C34.92 MALIGNANT NEOPLASM OF LEFT LUNG, UNSPECIFIED PART OF LUNG: ICD-10-CM

## 2023-02-21 PROCEDURE — 71250 CT THORAX DX C-: CPT

## 2023-02-22 ENCOUNTER — TELEPHONE (OUTPATIENT)
Dept: ONCOLOGY | Facility: CLINIC | Age: 73
End: 2023-02-22

## 2023-02-22 NOTE — TELEPHONE ENCOUNTER
Caller: Jennie Molina    Relationship to patient: Self    Best call back number: [phone]    Type of visit: LAB & R/S     Requested date: CALL TO R/S     If rescheduling, when is the original appointment: 3/2/2023 & 3/9/2023

## 2023-03-10 ENCOUNTER — OFFICE VISIT (OUTPATIENT)
Dept: ENT CLINIC | Age: 73
End: 2023-03-10

## 2023-03-10 ENCOUNTER — PROCEDURE VISIT (OUTPATIENT)
Dept: ENT CLINIC | Age: 73
End: 2023-03-10

## 2023-03-10 VITALS
DIASTOLIC BLOOD PRESSURE: 72 MMHG | BODY MASS INDEX: 28.83 KG/M2 | HEIGHT: 59 IN | WEIGHT: 143 LBS | SYSTOLIC BLOOD PRESSURE: 130 MMHG

## 2023-03-10 DIAGNOSIS — H90.3 SENSORINEURAL HEARING LOSS (SNHL) OF BOTH EARS: Primary | ICD-10-CM

## 2023-03-10 DIAGNOSIS — H61.23 BILATERAL IMPACTED CERUMEN: Primary | ICD-10-CM

## 2023-03-10 NOTE — PROGRESS NOTES
is a pleasant 71-year-old  female that presents for a cerumen check. Patient reports it had been 2 years since last cerumen disimpaction. .  She reports of muffled hearing that is more predominant to the right ear. She also admits to itching from the ear canal.      Physical examination with the microscope confirmed bilateral cerumen impactions present. This was removed successfully with alligator graspers and suction. After disimpaction, the TMs appear to be normal-please refer to separate dictated procedure note  Of note, patient reports that she still suffers with muffled hearing with no improvement with the disimpaction. Overall this is suspicious for possible worsening hearing issues. Neck exam demonstrated no lymphadenopathy or thyromegaly. Impression: Successful cerumen disimpaction bilaterally, probable worsening sensorineural hearing loss    Plan: I recommended patient to have an audiology screening due to the last being 2 years ago and with the noticeable change. This is currently scheduled for later today. She was recommended a 6-month cerumen check due to her history. She was reminded to call if she has any questions or problems.       Electronically signed by Paul Ahumada PA-C on 3/10/23 at 10:49 AM CST

## 2023-03-10 NOTE — PROGRESS NOTES
History   Payal Keys is a 67 y.o. female who presented to the clinic this date for a recheck of hearing. She had a hearing evaluation in 2021. At that time she had a normal to moderately severe SNHL bilaterally. She feels that her hearing has gotten worse since that time. Summary   Pure tone testing indicates normal to moderately severe SNHL bilaterally. When compared to audiogram obtained in November 2021, thresholds remained stable bilaterally. Results   Otoscopy:   Right: Clear EAC/Normal TM  Left: Clear EAC/Normal TM    Audiometry:   Right:  Normal to moderately severe  sloping SNHL  Left:  Normal to moderately severe  sloping SNHL         Plan   Results of today's testing were discussed with Ms. Erma Leavitt and the following recommendations were made: Follow up with ENT as scheduled. Hearing evaluation as desired. Monitor hearing yearly, sooner with changes.          Audiogram and Acoustic Immittance

## 2023-03-15 ENCOUNTER — LAB (OUTPATIENT)
Dept: LAB | Facility: HOSPITAL | Age: 73
End: 2023-03-15
Payer: MEDICARE

## 2023-03-15 DIAGNOSIS — C34.92 MALIGNANT NEOPLASM OF LEFT LUNG, UNSPECIFIED PART OF LUNG: ICD-10-CM

## 2023-03-15 LAB
ALBUMIN SERPL-MCNC: 4.4 G/DL (ref 3.5–5.2)
ALBUMIN/GLOB SERPL: 1.5 G/DL
ALP SERPL-CCNC: 76 U/L (ref 39–117)
ALT SERPL W P-5'-P-CCNC: 15 U/L (ref 1–33)
ANION GAP SERPL CALCULATED.3IONS-SCNC: 13 MMOL/L (ref 5–15)
AST SERPL-CCNC: 19 U/L (ref 1–32)
BASOPHILS # BLD AUTO: 0.09 10*3/MM3 (ref 0–0.2)
BASOPHILS NFR BLD AUTO: 0.9 % (ref 0–1.5)
BILIRUB SERPL-MCNC: 0.4 MG/DL (ref 0–1.2)
BUN SERPL-MCNC: 25 MG/DL (ref 8–23)
BUN/CREAT SERPL: 24.8 (ref 7–25)
CALCIUM SPEC-SCNC: 9.3 MG/DL (ref 8.6–10.5)
CEA SERPL-MCNC: 9.85 NG/ML
CHLORIDE SERPL-SCNC: 101 MMOL/L (ref 98–107)
CO2 SERPL-SCNC: 26 MMOL/L (ref 22–29)
CREAT SERPL-MCNC: 1.01 MG/DL (ref 0.57–1)
DEPRECATED RDW RBC AUTO: 51.8 FL (ref 37–54)
EGFRCR SERPLBLD CKD-EPI 2021: 58.9 ML/MIN/1.73
EOSINOPHIL # BLD AUTO: 0.39 10*3/MM3 (ref 0–0.4)
EOSINOPHIL NFR BLD AUTO: 3.8 % (ref 0.3–6.2)
ERYTHROCYTE [DISTWIDTH] IN BLOOD BY AUTOMATED COUNT: 14.4 % (ref 12.3–15.4)
GLOBULIN UR ELPH-MCNC: 3 GM/DL
GLUCOSE SERPL-MCNC: 103 MG/DL (ref 65–99)
HCT VFR BLD AUTO: 43.4 % (ref 34–46.6)
HGB BLD-MCNC: 14.2 G/DL (ref 12–15.9)
IMM GRANULOCYTES # BLD AUTO: 0.03 10*3/MM3 (ref 0–0.05)
IMM GRANULOCYTES NFR BLD AUTO: 0.3 % (ref 0–0.5)
LYMPHOCYTES # BLD AUTO: 3.89 10*3/MM3 (ref 0.7–3.1)
LYMPHOCYTES NFR BLD AUTO: 38.2 % (ref 19.6–45.3)
MCH RBC QN AUTO: 31.8 PG (ref 26.6–33)
MCHC RBC AUTO-ENTMCNC: 32.7 G/DL (ref 31.5–35.7)
MCV RBC AUTO: 97.3 FL (ref 79–97)
MONOCYTES # BLD AUTO: 0.87 10*3/MM3 (ref 0.1–0.9)
MONOCYTES NFR BLD AUTO: 8.5 % (ref 5–12)
NEUTROPHILS NFR BLD AUTO: 4.91 10*3/MM3 (ref 1.7–7)
NEUTROPHILS NFR BLD AUTO: 48.3 % (ref 42.7–76)
NRBC BLD AUTO-RTO: 0 /100 WBC (ref 0–0.2)
PLATELET # BLD AUTO: 397 10*3/MM3 (ref 140–450)
PMV BLD AUTO: 9.8 FL (ref 6–12)
POTASSIUM SERPL-SCNC: 4.1 MMOL/L (ref 3.5–5.2)
PROT SERPL-MCNC: 7.4 G/DL (ref 6–8.5)
RBC # BLD AUTO: 4.46 10*6/MM3 (ref 3.77–5.28)
SODIUM SERPL-SCNC: 140 MMOL/L (ref 136–145)
WBC NRBC COR # BLD: 10.18 10*3/MM3 (ref 3.4–10.8)

## 2023-03-15 PROCEDURE — 85025 COMPLETE CBC W/AUTO DIFF WBC: CPT

## 2023-03-15 PROCEDURE — 80053 COMPREHEN METABOLIC PANEL: CPT

## 2023-03-15 PROCEDURE — 82378 CARCINOEMBRYONIC ANTIGEN: CPT

## 2023-03-15 PROCEDURE — 36415 COLL VENOUS BLD VENIPUNCTURE: CPT

## 2023-03-17 NOTE — PROGRESS NOTES
[facility] HEMATOLOGY AND ONCOLOGY  [address]  [facility] [address] [phone]    Patient Name: Jennie Molina  Encounter Date: 03/22/2023  YOB: 1950  Patient Number: [id number]        REASON FOR VISIT: Jennie Molina is a 72-year-old female who returns in follow-up of 2 lung cancers: Squamous cell carcinoma in the right upper lobe, status post partial resection, 2004. On 08/25/2020 (31 months) -underwent robotic lingular segmentectomy for squamous cell carcinoma, 2.2 cm in greatest extent; negative pleural invasion; surgical margins negative for tumor; 1 benign lymph node (1/14). pTNM: pT1c, pN0.  She is here alone.    I have reviewed the HPI and verified with the patient the accuracy of it. No changes to interval history since the information was documented. Adelfo Grider MD 03/22/23     DIAGNOSTIC ABNORMALITIES/PREVIOUS INTERVENTIONS:         1.   History of active tobacco use (55+ years smoking history), urothelial carcinoma with positive supraclavicular lymph node biopsy, and left nephrectomy, 2009.  Had received chemotherapy and radiation.          -    2004 - History of right lung cancer:   · Station 7 node, biopsy:    ? Negative for carcinoma.  · Station 4, biopsy:    ? Negative for carcinoma.  · Lung, right upper lobe, mass, excisional biopsy:   ? Poorly differentiated non-small cell carcinoma (see microscopic description).  · Lymph node, station 7, resection:  ? Thirteen lymph nodes, negative for tumor (0/13).  · Lymph node, station 4R, resection:  ? Eighteen lymph nodes, negative for tumor (0/18).  · Lymph node, station 11R, resection:  ? Four lymph nodes, negative for tumor (0/4).  · Rib, 5th, resection:    ? Negative for carcinoma.  · Lymph node, station 10R, resection:  ? One lymph node, negative for tumor (0/1).  · Right upper lobe mass, excisional biopsy:  ? Poorly differentiated non small cell carcinoma;  see Tumor Characteristics and comment.  ? Tumor extends to the inked pleural surface.  · Right upper lobe, lobectomy:  ? Focal hemorrhage and collapse.  ? Negative for carcinoma.                     -    2009 - History of urothelial carcinoma:   10/21/2009:  Left kidney, radial nephrectomy:  · High grade urothelial carcinoma with extensive necrosis.  · Tumor arises in renal pelvis (in situ carcinoma) and invades through kidney to perinephric fat.  · Lymphovascular invasion: Not identified.  · Margins: Negative.  · One lymph node, negative for malignancy (0/1).  Left hilar lymph node:  · One lymph node, negative for malignancy (0/1).  Spleen:  · Spleen without significant pathologic change           -    11/13/2009:  Left supraclavicular lymph node:  Metastatic high grade carcinoma involving one of four lymph nodes (1/4), see comment.           2.   Surveillance CTs in October 2019 was found to have a new 5 mm nodule in the left lingula.         3.   02/19/2020-CT chest.  Impression: Largest 1.5 cm nodule at the lingula and new 5 mm pulmonary nodule (reported on outside scan not available).  Changes of right upper lobe resection.  Emphysema.  Right hydronephrosis versus extrarenal pelvis (outside imaging report 9/18/2019 indicates extrarenal pelvis but images not available for comparison).  Right adrenal nodule, incompletely characterized.  Prior splenectomy.  Possible left adrenalectomy and nephrectomy.         4.   03/03/2020-PET scan.  Hypermetabolic 1.5 cm pulmonary nodule in the lingula, highly suspicious for metastatic disease or primary lung neoplasm.  No other evidence of metastatic disease or abnormal FDG uptake.  2 cm left adrenal gland nodule, most compatible with adenoma.  6 mm pulmonary nodule at the right lung base in a background of scarring.  Non-FDG avid but too small to accurately characterize by PET scan.         5.   03/27/2020- seen for medical oncology by Dr. Ventura who referred her to   "[facility] of pulmonary in assessment of tissue biopsy.         6.   05/27/2020- seen by Dr. Enriquez of pulmonary in assessment of the left upper lobe nodule.  Review of records from [facility] and [facility] indicate prior history of lung cancer on the right side resected and more recently urothelial cancer of the  system.  All have been treated.  More recent surveillance CT identified an asymptomatic lesion in the left upper lobe adjacent to the fissure.  PET scan showed hypermetabolism in this lesion.  Patient was referred to [place] for possible navigational bronchoscopy/biopsy of the lesion that appears to be in the lingula.  Patient says she has received \"the maximum\" radiation for her lifetime.         7.   06/01/2020-CT chest.  Comparison 02/19/2020, 03/20/2020.  Impression: Slight increase in size of lingular nodule measuring 1.7 cm (previously 1.5 cm) concerning for malignancy.  Decreased size of right lower lobe pulmonary nodule now measures 0.3 cm (from 0.6 cm).  Stable postoperative changes in the right hilum.  Left nephrectomy.  Stable right adrenal low-density lesion, favored to represent an adenoma.         8.   06/10/2020- [facility] (Dr. Hare) bronchoscopy with biopsy.  Pathology: LUNG, LINGULA, TRANSBRONCHIAL BIOPSY:   · AT LEAST SQUAMOUS CELL CARCINOMA IN SITU.  · Immunohistochemistry demonstrates the neoplastic cells to be positive for p40 and negative for GATA3 and CK7, consistent with squamous differentiation.  LUNG NODULE, LINGULA, TRANSBRONCHIAL NEEDLE ASPIRATION WITH CELL BLOCK:   · INVOLVED BY CARINOMA, FAVOR SQUAMOUS CELL CARCINOMA (SEE COMMENT).  Comment: Immunohistochemical stains performed with appropriate controls show that the tumor cells are positive for p40 and negative for ELBA-3 and TTF-1. While this immunophenotype and cytomorphology can be seen in both a primary squamous cell carcinoma of the lung and " metastatic urothelial carcinoma, the lack of reactivity for ELBA-3 and the presence of carcinoma in situ in the corresponding surgical biopsy [id number] would favor a primary lung carcinoma.            9.   06/10/2020-hemoglobin 12.6, hematocrit 37, MCV 95, platelets 346,000, WBC 10.3.  BMP with calcium of 8.1 otherwise normal with creatinine 0.99.      10.   07/20/2020- MRI brain w and wo contrast: No metastatic disease.  Mild chronic microvascular changes.      11. 08/25/2020- robotic lingular segmentectomy (Dr. Moshe Hand).  Diagnosis: A.lung, lingular segmentectomy: Squamous cell carcinoma, 2.2 cm in greatest extent; negative for pleural invasion; surgical margins negative for tumor; 1 benign lymph node (see synoptic).  B.lymph node, level 9L excision: 1 benign lymph node.  C.lymph node, level 8L, excision: 2 benign lymph nodes.  D.lymph node, 7L, excision: 1 benign lymph node.  E.lymph node, level 5, excision: 3 benign lymph nodes.  F.lymph node, 12 L, excision: 1 benign lymph node.  G.lymph node, level 11 L, excision: 4 benign lymph nodes.  H.lymph node, 13 L, excision: At least one benign lymph node.  I.bronchial staple line: Unremarkable bronchus; negative for tumor.  J.lung, final parenchymal margin: Unremarkable lung parenchyma.  Synoptic: Lung specimen-procedure: Segmentectomy.  Specimen laterality: Left.  Spread through airspaces (STS): Not identified.  Total tumor size (size of entire tumor): Greatest dimension 2.2 cm.  Size of invasive component: 2.2 cm.  Tumor focality: Single focus.  Visceropleural invasion: Not identified.  Direct invasion of adjacent structures: No adjacent structures present.  Treatment effect: No known presurgical therapy.  Lymphovascular invasion: Not identified.  Margins: Uninvolved by tumor.  Lymph nodes: Number of lymph nodes involved: 0.  Number of lymph nodes examined: 14.  Lev stations examined: 7: Subcarinal, 5: Sub-aortic/aortopulmonary/AP window, 8L:  Paraesophageal, 9L, pulmonary ligament, 11 L, interlobar, 12 L, lobar 13 L segmental.  Pathologic stage classification (P TNM, AJCC eighth edition).  Primary tumor (pT): pT1c, regional lymph nodes (pN): pN0.  Likely insufficient material for molecular testing.    LABS    Lab Results - Last 18 Months   Lab Units 03/15/23  0903 09/01/22  1034 03/08/22  1058 01/27/22  1127   HEMOGLOBIN g/dL 14.2 13.8 13.9 13.8   HEMATOCRIT % 43.4 41.8 40.9 41.8   MCV fL 97.3* 94.8 96.5 95.7   WBC 10*3/mm3 10.18 10.58 9.82 9.15   RDW % 14.4 13.9 14.4 14.3   MPV fL 9.8 9.5 9.7 10.5   PLATELETS 10*3/mm3 397 417 400 354   IMM GRAN % % 0.3 0.3 0.5 0.2   NEUTROS ABS 10*3/mm3 4.91 7.12* 6.34 5.83   LYMPHS ABS 10*3/mm3 3.89* 2.28 2.34 2.25   MONOS ABS 10*3/mm3 0.87 0.85 0.79 0.66   EOS ABS 10*3/mm3 0.39 0.23 0.23 0.32   BASOS ABS 10*3/mm3 0.09 0.07 0.07 0.07   IMMATURE GRANS (ABS) 10*3/mm3 0.03 0.03 0.05 0.02   NRBC /100 WBC 0.0 0.0 0.0 0.0       Lab Results - Last 18 Months   Lab Units 03/15/23  0903 09/01/22  1034 03/08/22  1058   GLUCOSE mg/dL 103* 129* 139*   SODIUM mmol/L 140 137 141   POTASSIUM mmol/L 4.1 4.4 4.4   CO2 mmol/L 26.0 28.0 27.0   CHLORIDE mmol/L 101 102 103   ANION GAP mmol/L 13.0 7.0 11.0   CREATININE mg/dL 1.01* 1.09* 0.98   BUN mg/dL 25* 20 17   BUN / CREAT RATIO  24.8 18.3 17.3   CALCIUM mg/dL 9.3 9.3 9.5   ALK PHOS U/L 76 83 85   TOTAL PROTEIN g/dL 7.4 7.2 6.9   ALT (SGPT) U/L 15 14 18   AST (SGOT) U/L 19 17 18   BILIRUBIN mg/dL 0.4 0.3 0.2   ALBUMIN g/dL 4.4 4.50 4.40   GLOBULIN gm/dL 3.0 2.7 2.5         PAST MEDICAL HISTORY:  ALLERGIES:  Allergies   Allergen Reactions   • Iodides Hives and Itching     HIVES AND ITCHING POST CT STUDY ON 9/4/15     2-24-16 WITH PREMEDICATION PT STILL HAD A REACTION WITH HIVES AND THROAT TIGHTENING   HIVES AND ITCHING POST CT STUDY ON 9/4/15     2-24-16 WITH PREMEDICATION PT STILL HAD A REACTION WITH HIVES AND THROAT TIGHTENING      • Iodinated Contrast Media Hives     CURRENT  MEDICATIONS:  Outpatient Encounter Medications as of 3/22/2023   Medication Sig Dispense Refill   • ALPRAZolam (XANAX) 0.5 MG tablet Take 1 tablet by mouth At Night As Needed.     • Biotin (Biotin 5000) 5 MG capsule Take  by mouth.     • cholecalciferol (Vitamin D, Cholecalciferol,) 25 MCG (1000 UT) tablet Take 1 tablet by mouth Daily.     • Ginkgo Biloba Extract 60 MG capsule Take  by mouth.     • Misc Natural Products (NEURIVA PO) Take  by mouth.     • multivitamin with minerals (CENTRUM ADULTS PO) Take 1 tablet by mouth Daily.     • ST JOHNS WORT EXTRACT PO Take  by mouth.     • SUPER B COMPLEX/C PO Take  by mouth Daily.     • traZODone (DESYREL) 50 MG tablet Take 1 tablet by mouth Daily As Needed.     • melatonin 3 MG tablet Take 1 tablet by mouth Every Night. (Patient not taking: Reported on 3/22/2023)     • naproxen sodium (ALEVE) 220 MG tablet Take 1 tablet by mouth 2 (Two) Times a Day As Needed. (Patient not taking: Reported on 3/22/2023)       No facility-administered encounter medications on file as of 3/22/2023.       Adult illnesses:  Left urothelial carcinoma, metastatic with positive supraclavicular lymph node biopsy, 2009.  Lung cancer  Hives after CT contrast  Active tobacco use (55+ years)    Past surgeries:  Port-A-Cath placement, 3/27/2020   section  Left nephrectomy/adrenalectomy  Right upper lobe lung resection, 2004  Appendectomy  Cholecystectomy  Splenectomy  Hernia repair  Cataracts removed with lenses  Blepharoplasty, OU  06/10/2020- transbronchial needle aspiration of lingular lung nodule ([place]).  Carcinoma, favor squamous cell carcinoma.    ADULT ILLNESSES:  Patient Active Problem List   Diagnosis Code   • Abnormal Pap smear, low grade squamous intraepithelial lesion (LGSIL) [id number]   • Acute bilateral back pain M54.9   • Adenopathy, cervical R59.0   • Allergic rhinitis J30.9   • Benign neoplasm of adrenal gland D35.00   • Bilateral cataracts H26.9   • Bilateral hearing  loss H91.93   • Bilateral impacted cerumen H61.23   • Carpal tunnel syndrome G56.00   • Cervical high risk HPV (human papillomavirus) test positive R87.810   • Diverticulosis of large intestine K57.30   • Encounter for health-related screening Z13.9   • Leiomyoma of uterus, unspecified D25.9   • H/O splenectomy Z90.81   • Malignant neoplasm of kidney (HCC) C64.9   • Malignant neoplasm of lung (HCC) C34.90   • Metastatic cancer to cervical lymph nodes (HCC) C77.0   • Neck mass R22.1   • Plantar fasciitis M72.2   • Pulmonary nodule R91.1   • Restless legs syndrome (RLS) G25.81   • Malignant neoplasm (HCC) C80.1   • Encounter for care related to Port-a-Cath Z45.2   • History of lung cancer Z85.118   • Metastatic renal cell carcinoma (HCC) C64.9   • Other nonspecific abnormal finding R68.89   • Current every day smoker F17.200   • History of lobectomy of lung Z90.2     SURGERIES:  Past Surgical History:   Procedure Laterality Date   • ABDOMINAL SURGERY     • APPENDECTOMY     •  SECTION     • CHOLECYSTECTOMY     • HERNIA REPAIR     • LUNG REMOVAL, PARTIAL Right    • NEPHRECTOMY Left      HEALTH MAINTENANCE ITEMS:  Health Maintenance Due   Topic Date Due   • DXA SCAN  Never done   • COLORECTAL CANCER SCREENING  Never done   • ZOSTER VACCINE (1 of 2) Never done   • HEPATITIS C SCREENING  Never done   • COVID-19 Vaccine (3 - Moderna risk series) 2021   • MAMMOGRAM  2021   • ANNUAL WELLNESS VISIT  2022   • INFLUENZA VACCINE  2022       <no information>  Last Completed Colonoscopy     This patient has no relevant Health Maintenance data.        Immunization History   Administered Date(s) Administered   • COVID-19 (MODERNA) 1st, 2nd, 3rd Dose Only 2021, 2021   • FLUAD TRI 65YR+ 2019   • Fluzone High Dose =>65 Years ([employer] ONLY) 2019   • H1N1 Inj 2009   • Influenza, Unspecified 10/09/2012, 10/08/2013, 10/08/2013, 10/08/2014, 10/12/2015, 10/13/2016   • Pneumococcal  "Conjugate 13-Valent (PCV13) 09/15/2016   • Pneumococcal Polysaccharide (PPSV23) 05/16/2018   • Tdap 05/16/2018     Last Completed Mammogram     This patient has no relevant Health Maintenance data.            FAMILY HISTORY:  Family History   Problem Relation Age of Onset   • No Known Problems Mother    • No Known Problems Father    • Cancer Sister    • No Known Problems Maternal Grandmother    • No Known Problems Maternal Grandfather    • No Known Problems Paternal Grandmother    • No Known Problems Paternal Grandfather    • No Known Problems Brother    • No Known Problems Maternal Aunt    • No Known Problems Maternal Uncle    • No Known Problems Paternal Aunt    • No Known Problems Paternal Uncle    • No Known Problems Other    • Asthma Neg Hx    • Diabetes Neg Hx    • Emphysema Neg Hx    • Heart failure Neg Hx    • Hypertension Neg Hx      SOCIAL HISTORY:  Social History     Socioeconomic History   • Marital status:    Tobacco Use   • Smoking status: Every Day     Packs/day: 0.75     Years: 55.00     Pack years: 41.25     Types: Cigarettes     Start date: 1966   • Smokeless tobacco: Never   • Tobacco comments:     half a pack now   Substance and Sexual Activity   • Alcohol use: Not Currently   • Drug use: Never   • Sexual activity: Defer       REVIEW OF SYSTEMS:  Review of Systems   Constitutional: Positive for fatigue (\"tired but I manage.\"). Negative for activity change, appetite change, chills, diaphoresis, fever, unexpected weight gain and unexpected weight loss.        Manages all her ADLs, including chores, and driving.  Lives alone. 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  Says she is still active and \"am still up and about all the time\"   HENT: Negative.    Eyes: Negative.    Respiratory: Positive for cough (productive of clear/cream colored/never blood tinged - phlegm) and shortness of breath (Admits to MEHTA, occasional SOB with her routine activities. ). Negative for chest " "tightness and wheezing.         Is still smoking >/= 1/2 ppd - smoker since age 15.  \"Yep\"   Cardiovascular: Negative.    Gastrointestinal: Negative.    Genitourinary: Negative.    Musculoskeletal: Positive for arthralgias (shoulders, knees, back, left hip, hands). Negative for back pain (Left side radicular symptoms have been quiescent), joint swelling, myalgias and neck pain.   Skin: Negative.    Allergic/Immunologic: Positive for environmental allergies (\"pollen\").   Neurological: Positive for numbness (left hand, \"carpal tunnel\"). Negative for dizziness.   Hematological: Negative.    Psychiatric/Behavioral: Positive for depressed mood (\"on and off.\"). The patient is nervous/anxious.        /70   Pulse 74   Temp 97.6 °F (36.4 °C)   Resp 18   Ht 152.4 cm (60\")   Wt 66.4 kg (146 lb 6.4 oz)   SpO2 94%   BMI 28.59 kg/m²  Body surface area is 1.63 meters squared.  Pain Score    03/22/23 1148   PainSc: 0-No pain       Physical Exam:  Physical Exam   Constitutional: She is oriented to person, place, and time. No distress.   Pleasant, heavy set, modestly kept elderly female.  Ambulatory.  ECOG 1.      She has no weight changes (had lost 6 pounds at her 2 prior visits -had gained 10 pounds at her 3 visits prior to those) since her last visit.   HENT:   Head: Normocephalic and atraumatic.   Wearing a surgical mask today    Scarring of the left supraclavicular area from prior neck surgery   Eyes: Pupils are equal, round, and reactive to light. Conjunctivae are normal. No scleral icterus.   Neck: No JVD present. No tracheal deviation present. No thyromegaly present.   Cardiovascular: Normal rate and regular rhythm. Exam reveals no friction rub.   Pulmonary/Chest: No stridor. She has wheezes (soft, scattered). She has no rales.   Distant breath sounds    Healed thoracotomy incisions   Abdominal: Soft. Bowel sounds are normal. She exhibits no distension and no mass. There is no abdominal tenderness. There is no " rebound and no guarding.   Musculoskeletal: Normal range of motion. Deformity (Osteoarthritic changes of the hands) present.   Lymphadenopathy:     She has no cervical adenopathy.   Neurological: She is alert and oriented to person, place, and time. No cranial nerve deficit.   Skin: Skin is warm and dry. No rash noted. No erythema.   Psychiatric: Her behavior is normal. Thought content normal.   Vitals reviewed.      Assessment:  1.   Squamous cell lung carcinoma            Tumor stage: pTNM IA3 (pT1c,pN0, M0)            PET Tumor [place]: 1.7 cm nodule in the lingula.  No other PET evidence for metastatic disease.            Complications of tumor: None            Tumor status: Untreated.              -- Radiographically progressing (has grown to 1.7cm on chest CT, 06/01/2020 from 1.5 cm on chest CT, 02/19/2020).            -- 08/25/2020-robotic lingular segmentectomy (Dr. Moshe Hand).  Diagnosis: Squamous cell carcinoma, 2.2 cm in greatest extent; negative pleural invasion; surgical margins negative for tumor; 1 benign lymph node (1/14). pTNM: pT1c, pN0            --01/19/2021-CT chest.  Status post bilateral lung surgery with postop changes.  No CT evidence of malignancy or acute cardiopulmonary process.  Mild induration of the left axilla with small lymph nodes, significance uncertain.  Consider adenitis.  Stable low-attenuation right adrenal mass.  Changes from left nephrectomy.           --07/19/2021-CT chest.  Postsurgical changes.  No CT evidence of developing malignancy.  No acute cardiopulmonary process.           --12/27/2021-CT chest-stable treatment-related changes.  No evidence of new and/or recurrent disease.  Advanced coronary artery atheromatous calcification.           --09/01/2022- CT chest-no new or enlarging pulmonary nodules.  No evidence of new and/or recurrent disease.  Advanced coronary atherosclerosis and postoperative changes to the chest.           --2/22/2023- CT chest-  1. Stable  appearance of the chest from previous exam of 09/01/2022 with no radiographic evidence of recurrence or metastatic disease. 2. Changes of centrilobular emphysema. No evidence of consolidative pneumonia or effusion. No developing mediastinal or axillary lymphadenopathy.3. Stable hypodense right adrenal nodule likely representing an adenoma..             2.   History of non-small cell lung cancer in the right upper lobe, status post right upper lobectomy, 2004.  No details  3.   Metastatic urothelial carcinoma with positive supraclavicular lymph node biopsy and left nephrectomy, 2009.  Details unclear but apparently received chemotherapy and radiation.  4.   Tobacco use, 56+ years cigarette smoking  5.   Chronic kidney disease, stage III.    --GFR 58.9 mL/min, 3/15/2023 (prior: 51-61.5 mL/min)    6.   Abnormal CEA.  Tobacco smoker.  Needs follow-up.  --9.85, 3/15/2023 (prior: 8.54-9.53)    7.   Anxiety. Chronic  8.   Low-grade neutrophilic leukocytosis  --Resolved.  WBC 10.18, 3/15/2023.  Reactive to tobacco smoking associated bronchitis?      Recommendations:  1.   Apprised of labs, 3/15/2023 with normal CBC, depressed (stable) GFR, otherwise normal CMP, CEA 9.85 (prior: 7.69-9.53).    2.   Apprised of chest CT, 2/22/2023 (above).  DWIGHT  3.   Request pathology at [place] to send out biopsy specimen on 6/10/2020: EGFR, ALK, ROS 1, PDL1, BRAF-6th request.  4.   Review NCCN guidelines version 6.2020 non-small cell lung cancer-stage IA (peripheral T1 abc, N0).  If operable: Surgical exploration and resection plus mediastinal lymph node dissection/sampling.  Adjuvant chemotherapy only if node positive.  If margins negative (R0)-observation.  Surveillance- H&P and chest CT +/- contrast every 6 months for 2 to 3 years then H&P and low-dose noncontrast enhanced chest CT annually..  5.   Schedule CT chest without contrast in [facility] weeks at [facility].  Compare to prior studies.   6.   Return to office in 24 weeks with (specify  fasting) pre-office CMP, CBC with differential, and CEA.    I spent ~30 minutes caring for Jennie on this date of service. This time includes time spent by me in the following activities: preparing for the visit, reviewing tests, performing a medically appropriate examination and/or evaluation, counseling and educating the patient/family/caregiver, ordering medications, tests, or procedures and documenting information in the medical record

## 2023-03-22 ENCOUNTER — OFFICE VISIT (OUTPATIENT)
Dept: ONCOLOGY | Facility: CLINIC | Age: 73
End: 2023-03-22
Payer: MEDICARE

## 2023-03-22 VITALS
TEMPERATURE: 97.6 F | OXYGEN SATURATION: 94 % | WEIGHT: 146.4 LBS | BODY MASS INDEX: 28.74 KG/M2 | DIASTOLIC BLOOD PRESSURE: 70 MMHG | HEIGHT: 60 IN | HEART RATE: 74 BPM | RESPIRATION RATE: 18 BRPM | SYSTOLIC BLOOD PRESSURE: 114 MMHG

## 2023-03-22 DIAGNOSIS — C34.92 MALIGNANT NEOPLASM OF LEFT LUNG, UNSPECIFIED PART OF LUNG: Primary | ICD-10-CM

## 2023-03-22 PROCEDURE — 99214 OFFICE O/P EST MOD 30 MIN: CPT | Performed by: INTERNAL MEDICINE

## 2023-03-22 PROCEDURE — 1159F MED LIST DOCD IN RCRD: CPT | Performed by: INTERNAL MEDICINE

## 2023-03-22 PROCEDURE — 1160F RVW MEDS BY RX/DR IN RCRD: CPT | Performed by: INTERNAL MEDICINE

## 2023-03-22 PROCEDURE — 1126F AMNT PAIN NOTED NONE PRSNT: CPT | Performed by: INTERNAL MEDICINE

## 2023-03-22 RX ORDER — DIPHENHYDRAMINE HYDROCHLORIDE 25 MG/1
TABLET ORAL
COMMUNITY

## 2023-03-22 RX ORDER — MELATONIN
1000 DAILY
COMMUNITY

## 2023-03-22 RX ORDER — MULTIPLE VITAMINS W/ MINERALS TAB 9MG-400MCG
1 TAB ORAL DAILY
COMMUNITY

## 2023-03-22 RX ORDER — BETA-CAROTENE 7500 MCG
CAPSULE ORAL
COMMUNITY

## 2023-07-21 ENCOUNTER — TELEPHONE (OUTPATIENT)
Dept: ONCOLOGY | Facility: CLINIC | Age: 73
End: 2023-07-21

## 2023-07-21 NOTE — TELEPHONE ENCOUNTER
Caller: Jennie Molina    Relationship: Self    Best call back number: [phone]    What is the best time to reach you: ANYTIME    Who are you requesting to speak with (clinical staff, provider,  specific staff member):     What was the call regarding: PT RETURNING MISSED CALL  PLEASE LEAVE A MESSAGE IF NO ANSWER    Is it okay if the provider responds through MyChart: N/A

## 2023-09-21 ENCOUNTER — LAB (OUTPATIENT)
Dept: LAB | Facility: HOSPITAL | Age: 73
End: 2023-09-21

## 2023-09-21 ENCOUNTER — HOSPITAL ENCOUNTER (OUTPATIENT)
Dept: CT IMAGING | Facility: HOSPITAL | Age: 73
Discharge: HOME | End: 2023-09-21
Payer: MEDICARE

## 2023-09-21 DIAGNOSIS — C34.92 MALIGNANT NEOPLASM OF LEFT LUNG, UNSPECIFIED PART OF LUNG: ICD-10-CM

## 2023-09-21 LAB
ALBUMIN SERPL-MCNC: 4.1 G/DL (ref 3.5–5.2)
ALBUMIN/GLOB SERPL: 1.6 G/DL
ALP SERPL-CCNC: 79 U/L (ref 39–117)
ALT SERPL W P-5'-P-CCNC: 11 U/L (ref 1–33)
ANION GAP SERPL CALCULATED.3IONS-SCNC: 12 MMOL/L (ref 5–15)
AST SERPL-CCNC: 17 U/L (ref 1–32)
BASOPHILS # BLD AUTO: 0.07 10*3/MM3 (ref 0–0.2)
BASOPHILS NFR BLD AUTO: 0.6 % (ref 0–1.5)
BILIRUB SERPL-MCNC: 0.2 MG/DL (ref 0–1.2)
BUN SERPL-MCNC: 20 MG/DL (ref 8–23)
BUN/CREAT SERPL: 19.8 (ref 7–25)
CALCIUM SPEC-SCNC: 9.4 MG/DL (ref 8.6–10.5)
CHLORIDE SERPL-SCNC: 103 MMOL/L (ref 98–107)
CO2 SERPL-SCNC: 25 MMOL/L (ref 22–29)
CREAT SERPL-MCNC: 1.01 MG/DL (ref 0.57–1)
DEPRECATED RDW RBC AUTO: 49.1 FL (ref 37–54)
EGFRCR SERPLBLD CKD-EPI 2021: 58.9 ML/MIN/1.73
EOSINOPHIL # BLD AUTO: 0.09 10*3/MM3 (ref 0–0.4)
EOSINOPHIL NFR BLD AUTO: 0.8 % (ref 0.3–6.2)
ERYTHROCYTE [DISTWIDTH] IN BLOOD BY AUTOMATED COUNT: 14 % (ref 12.3–15.4)
GLOBULIN UR ELPH-MCNC: 2.6 GM/DL
GLUCOSE SERPL-MCNC: 98 MG/DL (ref 65–99)
HCT VFR BLD AUTO: 39.8 % (ref 34–46.6)
HGB BLD-MCNC: 13.2 G/DL (ref 12–15.9)
IMM GRANULOCYTES # BLD AUTO: 0.03 10*3/MM3 (ref 0–0.05)
IMM GRANULOCYTES NFR BLD AUTO: 0.3 % (ref 0–0.5)
LYMPHOCYTES # BLD AUTO: 3.35 10*3/MM3 (ref 0.7–3.1)
LYMPHOCYTES NFR BLD AUTO: 30.6 % (ref 19.6–45.3)
MCH RBC QN AUTO: 31.8 PG (ref 26.6–33)
MCHC RBC AUTO-ENTMCNC: 33.2 G/DL (ref 31.5–35.7)
MCV RBC AUTO: 95.9 FL (ref 79–97)
MONOCYTES # BLD AUTO: 0.72 10*3/MM3 (ref 0.1–0.9)
MONOCYTES NFR BLD AUTO: 6.6 % (ref 5–12)
NEUTROPHILS NFR BLD AUTO: 6.68 10*3/MM3 (ref 1.7–7)
NEUTROPHILS NFR BLD AUTO: 61.1 % (ref 42.7–76)
NRBC BLD AUTO-RTO: 0 /100 WBC (ref 0–0.2)
PLATELET # BLD AUTO: 380 10*3/MM3 (ref 140–450)
PMV BLD AUTO: 9.9 FL (ref 6–12)
POTASSIUM SERPL-SCNC: 4.2 MMOL/L (ref 3.5–5.2)
PROT SERPL-MCNC: 6.7 G/DL (ref 6–8.5)
RBC # BLD AUTO: 4.15 10*6/MM3 (ref 3.77–5.28)
SODIUM SERPL-SCNC: 140 MMOL/L (ref 136–145)
WBC NRBC COR # BLD: 10.94 10*3/MM3 (ref 3.4–10.8)

## 2023-09-21 PROCEDURE — 85025 COMPLETE CBC W/AUTO DIFF WBC: CPT

## 2023-09-21 PROCEDURE — 71250 CT THORAX DX C-: CPT

## 2023-09-21 PROCEDURE — 36415 COLL VENOUS BLD VENIPUNCTURE: CPT

## 2023-09-21 PROCEDURE — 82378 CARCINOEMBRYONIC ANTIGEN: CPT

## 2023-09-21 PROCEDURE — 80053 COMPREHEN METABOLIC PANEL: CPT

## 2023-09-21 NOTE — PROGRESS NOTES
[facility] HEMATOLOGY AND ONCOLOGY  [address]  [facility] [address] [phone]    Patient Name: Jennie Molina  Encounter Date: 09/28/2023  YOB: 1950  Patient Number: [id number]      REASON FOR VISIT: Jennie Molina is a 72-year-old female who returns in follow-up of 2 lung cancers: Squamous cell carcinoma in the right upper lobe, status post partial resection, 2004. On 08/25/2020 (37 months) -underwent robotic lingular segmentectomy for squamous cell carcinoma, 2.2 cm in greatest extent; negative pleural invasion; surgical margins negative for tumor; 1 benign lymph node (1/14). pTNM: pT1c, pN0.  She is here alone.    I have reviewed the HPI and verified with the patient the accuracy of it. No changes to interval history since the information was documented. Adelfo Grider MD 09/28/23      DIAGNOSTIC ABNORMALITIES/PREVIOUS INTERVENTIONS:         1.   History of active tobacco use (55+ years smoking history), urothelial carcinoma with positive supraclavicular lymph node biopsy, and left nephrectomy, 2009.  Had received chemotherapy and radiation.          -    2004 - History of right lung cancer:   Station 7 node, biopsy:    Negative for carcinoma.  Station 4, biopsy:    Negative for carcinoma.  Lung, right upper lobe, mass, excisional biopsy:   Poorly differentiated non-small cell carcinoma (see microscopic description).  Lymph node, station 7, resection:  Thirteen lymph nodes, negative for tumor (0/13).  Lymph node, station 4R, resection:  Eighteen lymph nodes, negative for tumor (0/18).  Lymph node, station 11R, resection:  Four lymph nodes, negative for tumor (0/4).  Rib, 5th, resection:    Negative for carcinoma.  Lymph node, station 10R, resection:  One lymph node, negative for tumor (0/1).  Right upper lobe mass, excisional biopsy:  Poorly differentiated non small cell carcinoma; see Tumor Characteristics and  comment.  Tumor extends to the inked pleural surface.  Right upper lobe, lobectomy:  Focal hemorrhage and collapse.  Negative for carcinoma.                     -    2009 - History of urothelial carcinoma:   10/21/2009:  Left kidney, radial nephrectomy:  High grade urothelial carcinoma with extensive necrosis.  Tumor arises in renal pelvis (in situ carcinoma) and invades through kidney to perinephric fat.  Lymphovascular invasion: Not identified.  Margins: Negative.  One lymph node, negative for malignancy (0/1).  Left hilar lymph node:  One lymph node, negative for malignancy (0/1).  Spleen:  Spleen without significant pathologic change           -    11/13/2009:  Left supraclavicular lymph node:  Metastatic high grade carcinoma involving one of four lymph nodes (1/4), see comment.           2.   Surveillance CTs in October 2019 was found to have a new 5 mm nodule in the left lingula.         3.   02/19/2020-CT chest.  Impression: Largest 1.5 cm nodule at the lingula and new 5 mm pulmonary nodule (reported on outside scan not available).  Changes of right upper lobe resection.  Emphysema.  Right hydronephrosis versus extrarenal pelvis (outside imaging report 9/18/2019 indicates extrarenal pelvis but images not available for comparison).  Right adrenal nodule, incompletely characterized.  Prior splenectomy.  Possible left adrenalectomy and nephrectomy.         4.   03/03/2020-PET scan.  Hypermetabolic 1.5 cm pulmonary nodule in the lingula, highly suspicious for metastatic disease or primary lung neoplasm.  No other evidence of metastatic disease or abnormal FDG uptake.  2 cm left adrenal gland nodule, most compatible with adenoma.  6 mm pulmonary nodule at the right lung base in a background of scarring.  Non-FDG avid but too small to accurately characterize by PET scan.         5.   03/27/2020- seen for medical oncology by Dr. Ventura who referred her to Dr. Enriquez of pulmonary in assessment of tissue biopsy.       "   6.   05/27/2020- seen by Dr. Enriquez of pulmonary in assessment of the left upper lobe nodule.  Review of records from [facility] and [facility] indicate prior history of lung cancer on the right side resected and more recently urothelial cancer of the  system.  All have been treated.  More recent surveillance CT identified an asymptomatic lesion in the left upper lobe adjacent to the fissure.  PET scan showed hypermetabolism in this lesion.  Patient was referred to [facility] for possible navigational bronchoscopy/biopsy of the lesion that appears to be in the lingula.  Patient says she has received \"the maximum\" radiation for her lifetime.         7.   06/01/2020-CT chest.  Comparison 02/19/2020, 03/20/2020.  Impression: Slight increase in size of lingular nodule measuring 1.7 cm (previously 1.5 cm) concerning for malignancy.  Decreased size of right lower lobe pulmonary nodule now measures 0.3 cm (from 0.6 cm).  Stable postoperative changes in the right hilum.  Left nephrectomy.  Stable right adrenal low-density lesion, favored to represent an adenoma.         8.   06/10/2020- [facility] (Dr. Hare) bronchoscopy with biopsy.  Pathology: LUNG, LINGULA, TRANSBRONCHIAL BIOPSY:   AT LEAST SQUAMOUS CELL CARCINOMA IN SITU.  Immunohistochemistry demonstrates the neoplastic cells to be positive for p40 and negative for GATA3 and CK7, consistent with squamous differentiation.  LUNG NODULE, LINGULA, TRANSBRONCHIAL NEEDLE ASPIRATION WITH CELL BLOCK:   INVOLVED BY CARINOMA, FAVOR SQUAMOUS CELL CARCINOMA (SEE COMMENT).  Comment: Immunohistochemical stains performed with appropriate controls show that the tumor cells are positive for p40 and negative for ELBA-3 and TTF-1. While this immunophenotype and cytomorphology can be seen in both a primary squamous cell carcinoma of the lung and metastatic urothelial carcinoma, the lack of reactivity for ELBA-3 and " the presence of carcinoma in situ in the corresponding surgical biopsy [id number] would favor a primary lung carcinoma.            9.   06/10/2020-hemoglobin 12.6, hematocrit 37, MCV 95, platelets 346,000, WBC 10.3.  BMP with calcium of 8.1 otherwise normal with creatinine 0.99.      10.   07/20/2020- MRI brain w and wo contrast: No metastatic disease.  Mild chronic microvascular changes.      11. 08/25/2020- robotic lingular segmentectomy (Dr. Moshe Hand).  Diagnosis: A.lung, lingular segmentectomy: Squamous cell carcinoma, 2.2 cm in greatest extent; negative for pleural invasion; surgical margins negative for tumor; 1 benign lymph node (see synoptic).  B.lymph node, level 9L excision: 1 benign lymph node.  C.lymph node, level 8L, excision: 2 benign lymph nodes.  D.lymph node, 7L, excision: 1 benign lymph node.  E.lymph node, level 5, excision: 3 benign lymph nodes.  F.lymph node, 12 L, excision: 1 benign lymph node.  G.lymph node, level 11 L, excision: 4 benign lymph nodes.  H.lymph node, 13 L, excision: At least one benign lymph node.  I.bronchial staple line: Unremarkable bronchus; negative for tumor.  J.lung, final parenchymal margin: Unremarkable lung parenchyma.  Synoptic: Lung specimen-procedure: Segmentectomy.  Specimen laterality: Left.  Spread through airspaces (STS): Not identified.  Total tumor size (size of entire tumor): Greatest dimension 2.2 cm.  Size of invasive component: 2.2 cm.  Tumor focality: Single focus.  Visceropleural invasion: Not identified.  Direct invasion of adjacent structures: No adjacent structures present.  Treatment effect: No known presurgical therapy.  Lymphovascular invasion: Not identified.  Margins: Uninvolved by tumor.  Lymph nodes: Number of lymph nodes involved: 0.  Number of lymph nodes examined: 14.  Lev stations examined: 7: Subcarinal, 5: Sub-aortic/aortopulmonary/AP window, 8L: Paraesophageal, 9L, pulmonary ligament, 11 L, interlobar, 12 L, lobar 13 L  segmental.  Pathologic stage classification (P TNM, AJCC eighth edition).  Primary tumor (pT): pT1c, regional lymph nodes (pN): pN0.  Likely insufficient material for molecular testing.    LABS    Lab Results - Last 18 Months   Lab Units 09/21/23  1456 03/15/23  0903 09/01/22  1034   HEMOGLOBIN g/dL 13.2 14.2 13.8   HEMATOCRIT % 39.8 43.4 41.8   MCV fL 95.9 97.3* 94.8   WBC 10*3/mm3 10.94* 10.18 10.58   RDW % 14.0 14.4 13.9   MPV fL 9.9 9.8 9.5   PLATELETS 10*3/mm3 380 397 417   IMM GRAN % % 0.3 0.3 0.3   NEUTROS ABS 10*3/mm3 6.68 4.91 7.12*   LYMPHS ABS 10*3/mm3 3.35* 3.89* 2.28   MONOS ABS 10*3/mm3 0.72 0.87 0.85   EOS ABS 10*3/mm3 0.09 0.39 0.23   BASOS ABS 10*3/mm3 0.07 0.09 0.07   IMMATURE GRANS (ABS) 10*3/mm3 0.03 0.03 0.03   NRBC /100 WBC 0.0 0.0 0.0       Lab Results - Last 18 Months   Lab Units 09/21/23  1456 03/15/23  0903 09/01/22  1034   GLUCOSE mg/dL 98 103* 129*   SODIUM mmol/L 140 140 137   POTASSIUM mmol/L 4.2 4.1 4.4   CO2 mmol/L 25.0 26.0 28.0   CHLORIDE mmol/L 103 101 102   ANION GAP mmol/L 12.0 13.0 7.0   CREATININE mg/dL 1.01* 1.01* 1.09*   BUN mg/dL 20 25* 20   BUN / CREAT RATIO  19.8 24.8 18.3   CALCIUM mg/dL 9.4 9.3 9.3   ALK PHOS U/L 79 76 83   TOTAL PROTEIN g/dL 6.7 7.4 7.2   ALT (SGPT) U/L 11 15 14   AST (SGOT) U/L 17 19 17   BILIRUBIN mg/dL 0.2 0.4 0.3   ALBUMIN g/dL 4.1 4.4 4.50   GLOBULIN gm/dL 2.6 3.0 2.7         PAST MEDICAL HISTORY:  ALLERGIES:  Allergies   Allergen Reactions    Iodides Hives and Itching     HIVES AND ITCHING POST CT STUDY ON 9/4/15     2-24-16 WITH PREMEDICATION PT STILL HAD A REACTION WITH HIVES AND THROAT TIGHTENING   HIVES AND ITCHING POST CT STUDY ON 9/4/15     2-24-16 WITH PREMEDICATION PT STILL HAD A REACTION WITH HIVES AND THROAT TIGHTENING       Iodinated Contrast Media Hives     CURRENT MEDICATIONS:  Outpatient Encounter Medications as of 9/28/2023   Medication Sig Dispense Refill    ALPRAZolam (XANAX) 0.5 MG tablet Take 1 tablet by mouth At Night As  Needed.      Biotin 5 MG capsule Take  by mouth.      cholecalciferol (VITAMIN D3) 25 MCG (1000 UT) tablet Take 1 tablet by mouth Daily.      Ginkgo Biloba Extract 60 MG capsule Take  by mouth.      melatonin 3 MG tablet Take 1 tablet by mouth Every Night.      Misc Natural Products (NEURIVA PO) Take  by mouth.      multivitamin with minerals tablet tablet Take 1 tablet by mouth Daily.      naproxen sodium (ALEVE) 220 MG tablet Take 1 tablet by mouth 2 (Two) Times a Day As Needed.      ST JOHNS WORT EXTRACT PO Take  by mouth.      SUPER B COMPLEX/C PO Take  by mouth Daily.      traZODone (DESYREL) 50 MG tablet Take 1 tablet by mouth Daily As Needed.       No facility-administered encounter medications on file as of 2023.       Adult illnesses:  Left urothelial carcinoma, metastatic with positive supraclavicular lymph node biopsy, 2009.  Lung cancer  Hives after CT contrast  Active tobacco use (55+ years)    Past surgeries:  Port-A-Cath placement, 3/27/2020   section  Left nephrectomy/adrenalectomy  Right upper lobe lung resection,   Appendectomy  Cholecystectomy  Splenectomy  Hernia repair  Cataracts removed with lenses  Blepharoplasty, OU  06/10/2020- transbronchial needle aspiration of lingular lung nodule ([facility]).  Carcinoma, favor squamous cell carcinoma.    ADULT ILLNESSES:  Patient Active Problem List   Diagnosis Code    Abnormal Pap smear, low grade squamous intraepithelial lesion (LGSIL) [id number]    Acute bilateral back pain M54.9    Adenopathy, cervical R59.0    Allergic rhinitis J30.9    Benign neoplasm of adrenal gland D35.00    Bilateral cataracts H26.9    Bilateral hearing loss H91.93    Bilateral impacted cerumen H61.23    Carpal tunnel syndrome G56.00    Cervical high risk HPV (human papillomavirus) test positive R87.810    Diverticulosis of large intestine K57.30    Encounter for health-related screening Z13.9    Leiomyoma of uterus, unspecified D25.9    H/O splenectomy  Z90.81    Malignant neoplasm of kidney C64.9    Malignant neoplasm of lung C34.90    Metastatic cancer to cervical lymph nodes C77.0    Neck mass R22.1    Plantar fasciitis M72.2    Pulmonary nodule R91.1    Restless legs syndrome (RLS) G25.81    Malignant neoplasm C80.1    Encounter for care related to Port-a-Cath Z45.2    History of lung cancer Z85.118    Metastatic renal cell carcinoma C64.9    Other nonspecific abnormal finding R68.89    Current every day smoker F17.200    History of lobectomy of lung Z90.2     SURGERIES:  Past Surgical History:   Procedure Laterality Date    ABDOMINAL SURGERY      APPENDECTOMY       SECTION      CHOLECYSTECTOMY      HERNIA REPAIR      LUNG REMOVAL, PARTIAL Right     NEPHRECTOMY Left      HEALTH MAINTENANCE ITEMS:  Health Maintenance Due   Topic Date Due    DXA SCAN  Never done    BMI FOLLOWUP  Never done    COLORECTAL CANCER SCREENING  Never done    ZOSTER VACCINE (1 of 2) Never done    HEPATITIS C SCREENING  Never done    MAMMOGRAM  2021    COVID-19 Vaccine (3 - Moderna risk series) 2022    ANNUAL WELLNESS VISIT  2022       <no information>  Last Completed Colonoscopy       This patient has no relevant Health Maintenance data.          Immunization History   Administered Date(s) Administered    COVID-19 (MODERNA) 1st,2nd,3rd Dose Monovalent 2021, 2021    FLUAD TRI 65YR+ 2019    Fluzone High Dose =>65 Years ([employer] ONLY) 2019    H1N1 Inj 2009    Influenza, Unspecified 10/09/2012, 10/08/2013, 10/08/2013, 10/08/2014, 10/12/2015, 10/13/2016    Pneumococcal Conjugate 13-Valent (PCV13) 09/15/2016    Pneumococcal Polysaccharide (PPSV23) 2018    Tdap 2018     Last Completed Mammogram       This patient has no relevant Health Maintenance data.              FAMILY HISTORY:  Family History   Problem Relation Age of Onset    No Known Problems Mother     No Known Problems Father     Cancer Sister     No Known Problems  "Maternal Grandmother     No Known Problems Maternal Grandfather     No Known Problems Paternal Grandmother     No Known Problems Paternal Grandfather     No Known Problems Brother     No Known Problems Maternal Aunt     No Known Problems Maternal Uncle     No Known Problems Paternal Aunt     No Known Problems Paternal Uncle     No Known Problems Other     Asthma Neg Hx     Diabetes Neg Hx     Emphysema Neg Hx     Heart failure Neg Hx     Hypertension Neg Hx      SOCIAL HISTORY:  Social History     Socioeconomic History    Marital status:    Tobacco Use    Smoking status: Every Day     Packs/day: 0.75     Years: 55.00     Pack years: 41.25     Types: Cigarettes     Start date: 1966    Smokeless tobacco: Never    Tobacco comments:     half a pack now   Substance and Sexual Activity    Alcohol use: Not Currently    Drug use: Never    Sexual activity: Defer       REVIEW OF SYSTEMS:  Review of Systems   Constitutional:  Negative for activity change, appetite change, chills, diaphoresis, fatigue (\"tired but I manage.\"), fever, unexpected weight gain and unexpected weight loss.        Manages all her ADLs, including chores, and driving.  Lives alone. 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  Says she is still active and \"am still up and about all the time\"   HENT: Negative.     Eyes: Negative.    Respiratory:  Positive for cough (productive of clear/cream colored/never blood tinged - phlegm) and shortness of breath (Admits to MEHTA, occasional SOB with her routine activities. ). Negative for chest tightness and wheezing.         Is still smoking > 1/2 ppd - smoker since age 15.  \"Sometimes more\"   Cardiovascular: Negative.    Gastrointestinal: Negative.  Positive for constipation (Modulated by senna/Miralax).   Genitourinary: Negative.    Musculoskeletal:  Positive for arthralgias (shoulders, knees, back, left hip, hands.  \"Not new\"). Negative for back pain (Left side radicular symptoms have " "been quiescent), joint swelling, myalgias and neck pain.   Skin: Negative.    Allergic/Immunologic: Positive for environmental allergies (\"pollen\").   Neurological:  Positive for numbness (left hand, \"carpal tunnel\"). Negative for dizziness.   Hematological: Negative.    Psychiatric/Behavioral:  Positive for depressed mood (\"on and off.\"). The patient is nervous/anxious.        /70   Pulse 72   Temp 97.3 °F (36.3 °C) (Temporal)   Resp 18   Ht 152.4 cm (60\")   Wt 64.4 kg (142 lb)   SpO2 97%   BMI 27.73 kg/m²  Body surface area is 1.61 meters squared.  Pain Score    09/28/23 1123   PainSc: 0-No pain       Physical Exam:  Physical Exam   Constitutional: She is oriented to person, place, and time. No distress.   Pleasant, heavy set, modestly kept elderly female.  Ambulatory.  ECOG 1.      She has lost 4 lb (no weight changes at her prior visit) since her last visit.   HENT:   Head: Normocephalic and atraumatic.   Scarring of the left supraclavicular area from prior neck surgery   Eyes: Pupils are equal, round, and reactive to light. Conjunctivae are normal. No scleral icterus.   Neck: No JVD present. No tracheal deviation present. No thyromegaly present.   Cardiovascular: Normal rate and regular rhythm. Exam reveals no friction rub.   Pulmonary/Chest: No stridor. She has wheezes (soft, scattered). She has no rales.   Distant breath sounds    Healed thoracotomy incisions   Abdominal: Soft. Bowel sounds are normal. She exhibits no distension and no mass. There is no abdominal tenderness. There is no rebound and no guarding.   Musculoskeletal: Normal range of motion. Deformity (Osteoarthritic changes of the hands) present.   Lymphadenopathy:     She has no cervical adenopathy.   Neurological: She is alert and oriented to person, place, and time. No cranial nerve deficit.   Skin: Skin is warm and dry. No rash noted. No erythema.   Psychiatric: Her behavior is normal. Thought content normal.   Vitals " reviewed.    Assessment:  1.   Squamous cell lung carcinoma            Tumor stage: pTNM IA3 (pT1c,pN0, M0)            PET Tumor [place]: 1.7 cm nodule in the lingula.  No other PET evidence for metastatic disease.            Complications of tumor: None            Tumor status: Untreated.              -- Radiographically progressing (has grown to 1.7cm on chest CT, 06/01/2020 from 1.5 cm on chest CT, 02/19/2020).            -- 08/25/2020-robotic lingular segmentectomy (Dr. Moshe Hand).  Diagnosis: Squamous cell carcinoma, 2.2 cm in greatest extent; negative pleural invasion; surgical margins negative for tumor; 1 benign lymph node (1/14). pTNM: pT1c, pN0            --01/19/2021-CT chest.  Status post bilateral lung surgery with postop changes.  No CT evidence of malignancy or acute cardiopulmonary process.  Mild induration of the left axilla with small lymph nodes, significance uncertain.  Consider adenitis.  Stable low-attenuation right adrenal mass.  Changes from left nephrectomy.           --07/19/2021-CT chest.  Postsurgical changes.  No CT evidence of developing malignancy.  No acute cardiopulmonary process.           --12/27/2021-CT chest-stable treatment-related changes.  No evidence of new and/or recurrent disease.  Advanced coronary artery atheromatous calcification.           --09/01/2022- CT chest-no new or enlarging pulmonary nodules.  No evidence of new and/or recurrent disease.  Advanced coronary atherosclerosis and postoperative changes to the chest.           --2/22/2023- CT chest-  1. Stable appearance of the chest from previous exam of 09/01/2022 with no radiographic evidence of recurrence or metastatic disease. 2. Changes of centrilobular emphysema. No evidence of consolidative pneumonia or effusion. No developing mediastinal or axillary lymphadenopathy.3. Stable hypodense right adrenal nodule likely representing an adenoma..          --9/21/23- CT chest-  No evidence of disease recurrence or  progression. Similar posttreatment changes of the bilateral chest. Moderate emphysematous changes. Heavily calcified coronary arteries persistent material. Small hiatal hernia. Similar low-density enlargement of the RIGHT adrenal gland as far back as 7/19/2021, favored to represent an adrenal adenoma.             2.   History of non-small cell lung cancer in the right upper lobe, status post right upper lobectomy, 2004.  No details  3.   Metastatic urothelial carcinoma with positive supraclavicular lymph node biopsy and left nephrectomy, 2009.  Details unclear but apparently received chemotherapy and radiation.  4.   Tobacco use, 56+ years cigarette smoking  5.   Chronic kidney disease, stage III.    --GFR 58.9 mL/min, 9/21/2023 (prior: 51-61.5 mL/min)    6.   Abnormal CEA.  Tobacco smoker.  Needs follow-up.  --9.5, 9/21/2023 (prior: 8.54-9.85)    7.   Anxiety. Chronic  8.   Low-grade neutrophilic leukocytosis  --WBC 10.9, 9/21/23.  Reactive to tobacco smoking associated bronchitis?      Recommendations:  1.   Apprised of labs, 9/21/23 with normal CBC, depressed (stable) GFR, otherwise normal CMP, CEA 9.5 (prior: 7.69-9.85).    2.   Apprised of chest CT, 9/21/23 (above).  DWIGHT    3.   Request pathology at [facility] to send out biopsy specimen on 6/10/2020: EGFR, ALK, ROS 1, PDL1, BRAF-6th request.  4.   Review NCCN guidelines version 6.2020 non-small cell lung cancer-stage IA (peripheral T1 abc, N0).  If operable: Surgical exploration and resection plus mediastinal lymph node dissection/sampling.  Adjuvant chemotherapy only if node positive.  If margins negative (R0)-observation.  Surveillance- H&P and chest CT +/- contrast every 6 months for 2 to 3 years then H&P and low-dose noncontrast enhanced chest CT annually..  5.   Schedule CT chest without contrast in 23 weeks at [facility].  Compare to prior studies.   6.   Return to office in 24 weeks with (specify fasting) pre-office CMP, CBC with differential, and CEA.  7.    Importance of Smoking Cessation discussed with patient and informed patient additional information will be on today's [facility]'s Flyer - Plan to Be Tobacco Free handout provided to patient     I spent ~38 minutes caring for Jennie on this date of service. This time includes time spent by me in the following activities: preparing for the visit, reviewing tests, performing a medically appropriate examination and/or evaluation, counseling and educating the patient/family/caregiver, ordering medications, tests, or procedures and documenting information in the medical record

## 2023-09-22 LAB — CEA SERPL-MCNC: 9.5 NG/ML

## 2023-09-28 ENCOUNTER — OFFICE VISIT (OUTPATIENT)
Dept: ONCOLOGY | Facility: CLINIC | Age: 73
End: 2023-09-28
Payer: MEDICARE

## 2023-09-28 VITALS
WEIGHT: 142 LBS | SYSTOLIC BLOOD PRESSURE: 124 MMHG | HEIGHT: 60 IN | TEMPERATURE: 97.3 F | BODY MASS INDEX: 27.88 KG/M2 | DIASTOLIC BLOOD PRESSURE: 70 MMHG | RESPIRATION RATE: 18 BRPM | HEART RATE: 72 BPM | OXYGEN SATURATION: 97 %

## 2023-09-28 DIAGNOSIS — C34.92 MALIGNANT NEOPLASM OF LEFT LUNG, UNSPECIFIED PART OF LUNG: Primary | ICD-10-CM

## 2024-03-21 ENCOUNTER — LAB (OUTPATIENT)
Dept: LAB | Facility: HOSPITAL | Age: 74
End: 2024-03-21
Payer: MEDICARE

## 2024-03-21 ENCOUNTER — HOSPITAL ENCOUNTER (OUTPATIENT)
Dept: CT IMAGING | Facility: HOSPITAL | Age: 74
Discharge: HOME | End: 2024-03-21
Payer: MEDICARE

## 2024-03-21 DIAGNOSIS — C34.92 MALIGNANT NEOPLASM OF LEFT LUNG, UNSPECIFIED PART OF LUNG: ICD-10-CM

## 2024-03-21 LAB
ALBUMIN SERPL-MCNC: 4.2 G/DL (ref 3.5–5.2)
ALBUMIN/GLOB SERPL: 1.6 G/DL
ALP SERPL-CCNC: 82 U/L (ref 39–117)
ALT SERPL W P-5'-P-CCNC: 14 U/L (ref 1–33)
ANION GAP SERPL CALCULATED.3IONS-SCNC: 10 MMOL/L (ref 5–15)
AST SERPL-CCNC: 20 U/L (ref 1–32)
BASOPHILS # BLD AUTO: 0.06 10*3/MM3 (ref 0–0.2)
BASOPHILS NFR BLD AUTO: 0.6 % (ref 0–1.5)
BILIRUB SERPL-MCNC: 0.4 MG/DL (ref 0–1.2)
BUN SERPL-MCNC: 17 MG/DL (ref 8–23)
BUN/CREAT SERPL: 18.3 (ref 7–25)
CALCIUM SPEC-SCNC: 9.5 MG/DL (ref 8.6–10.5)
CEA SERPL-MCNC: 10.4 NG/ML
CHLORIDE SERPL-SCNC: 105 MMOL/L (ref 98–107)
CO2 SERPL-SCNC: 28 MMOL/L (ref 22–29)
CREAT SERPL-MCNC: 0.93 MG/DL (ref 0.57–1)
DEPRECATED RDW RBC AUTO: 49 FL (ref 37–54)
EGFRCR SERPLBLD CKD-EPI 2021: 64.6 ML/MIN/1.73
EOSINOPHIL # BLD AUTO: 0.32 10*3/MM3 (ref 0–0.4)
EOSINOPHIL NFR BLD AUTO: 3 % (ref 0.3–6.2)
ERYTHROCYTE [DISTWIDTH] IN BLOOD BY AUTOMATED COUNT: 14 % (ref 12.3–15.4)
GLOBULIN UR ELPH-MCNC: 2.7 GM/DL
GLUCOSE SERPL-MCNC: 92 MG/DL (ref 65–99)
HCT VFR BLD AUTO: 40.1 % (ref 34–46.6)
HGB BLD-MCNC: 13.5 G/DL (ref 12–15.9)
IMM GRANULOCYTES # BLD AUTO: 0.04 10*3/MM3 (ref 0–0.05)
IMM GRANULOCYTES NFR BLD AUTO: 0.4 % (ref 0–0.5)
LYMPHOCYTES # BLD AUTO: 2.78 10*3/MM3 (ref 0.7–3.1)
LYMPHOCYTES NFR BLD AUTO: 26.2 % (ref 19.6–45.3)
MCH RBC QN AUTO: 32 PG (ref 26.6–33)
MCHC RBC AUTO-ENTMCNC: 33.7 G/DL (ref 31.5–35.7)
MCV RBC AUTO: 95 FL (ref 79–97)
MONOCYTES # BLD AUTO: 0.81 10*3/MM3 (ref 0.1–0.9)
MONOCYTES NFR BLD AUTO: 7.6 % (ref 5–12)
NEUTROPHILS NFR BLD AUTO: 6.61 10*3/MM3 (ref 1.7–7)
NEUTROPHILS NFR BLD AUTO: 62.2 % (ref 42.7–76)
NRBC BLD AUTO-RTO: 0 /100 WBC (ref 0–0.2)
PLATELET # BLD AUTO: 401 10*3/MM3 (ref 140–450)
PMV BLD AUTO: 9.8 FL (ref 6–12)
POTASSIUM SERPL-SCNC: 4.1 MMOL/L (ref 3.5–5.2)
PROT SERPL-MCNC: 6.9 G/DL (ref 6–8.5)
RBC # BLD AUTO: 4.22 10*6/MM3 (ref 3.77–5.28)
SODIUM SERPL-SCNC: 143 MMOL/L (ref 136–145)
WBC NRBC COR # BLD AUTO: 10.62 10*3/MM3 (ref 3.4–10.8)

## 2024-03-21 PROCEDURE — 82378 CARCINOEMBRYONIC ANTIGEN: CPT

## 2024-03-21 PROCEDURE — 80053 COMPREHEN METABOLIC PANEL: CPT

## 2024-03-21 PROCEDURE — 85025 COMPLETE CBC W/AUTO DIFF WBC: CPT

## 2024-03-21 PROCEDURE — 71250 CT THORAX DX C-: CPT

## 2024-03-21 PROCEDURE — 36415 COLL VENOUS BLD VENIPUNCTURE: CPT

## 2024-04-29 NOTE — PROGRESS NOTES
[facility] HEMATOLOGY AND ONCOLOGY  [address]  [place] [address] [phone]    Patient Name: Jennie Molina  Encounter Date: 05/02/2024  YOB: 1950  Patient Number: [id number]      REASON FOR VISIT: Jennie Molina is a 73-year-old female who returns in follow-up of 2 lung cancers: Squamous cell carcinoma in the right upper lobe, status post partial resection, 2004. On 08/25/2020 (44 months) -underwent robotic lingular segmentectomy for squamous cell carcinoma, 2.2 cm in greatest extent; negative pleural invasion; surgical margins negative for tumor; 1 benign lymph node (1/14). pTNM: pT1c, pN0.  She is here alone.    I have reviewed the HPI and verified with the patient the accuracy of it. No changes to interval history since the information was documented. Adelfo Grider MD 05/02/24        DIAGNOSTIC ABNORMALITIES/PREVIOUS INTERVENTIONS:         1.   History of active tobacco use (55+ years smoking history), urothelial carcinoma with positive supraclavicular lymph node biopsy, and left nephrectomy, 2009.  Had received chemotherapy and radiation.          -    2004 - History of right lung cancer:   Station 7 node, biopsy:    Negative for carcinoma.  Station 4, biopsy:    Negative for carcinoma.  Lung, right upper lobe, mass, excisional biopsy:   Poorly differentiated non-small cell carcinoma (see microscopic description).  Lymph node, station 7, resection:  Thirteen lymph nodes, negative for tumor (0/13).  Lymph node, station 4R, resection:  Eighteen lymph nodes, negative for tumor (0/18).  Lymph node, station 11R, resection:  Four lymph nodes, negative for tumor (0/4).  Rib, 5th, resection:    Negative for carcinoma.  Lymph node, station 10R, resection:  One lymph node, negative for tumor (0/1).  Right upper lobe mass, excisional biopsy:  Poorly differentiated non small cell carcinoma; see Tumor Characteristics and  comment.  Tumor extends to the inked pleural surface.  Right upper lobe, lobectomy:  Focal hemorrhage and collapse.  Negative for carcinoma.                     -    2009 - History of urothelial carcinoma:   10/21/2009:  Left kidney, radial nephrectomy:  High grade urothelial carcinoma with extensive necrosis.  Tumor arises in renal pelvis (in situ carcinoma) and invades through kidney to perinephric fat.  Lymphovascular invasion: Not identified.  Margins: Negative.  One lymph node, negative for malignancy (0/1).  Left hilar lymph node:  One lymph node, negative for malignancy (0/1).  Spleen:  Spleen without significant pathologic change           -    11/13/2009:  Left supraclavicular lymph node:  Metastatic high grade carcinoma involving one of four lymph nodes (1/4), see comment.           2.   Surveillance CTs in October 2019 was found to have a new 5 mm nodule in the left lingula.         3.   02/19/2020-CT chest.  Impression: Largest 1.5 cm nodule at the lingula and new 5 mm pulmonary nodule (reported on outside scan not available).  Changes of right upper lobe resection.  Emphysema.  Right hydronephrosis versus extrarenal pelvis (outside imaging report 9/18/2019 indicates extrarenal pelvis but images not available for comparison).  Right adrenal nodule, incompletely characterized.  Prior splenectomy.  Possible left adrenalectomy and nephrectomy.         4.   03/03/2020-PET scan.  Hypermetabolic 1.5 cm pulmonary nodule in the lingula, highly suspicious for metastatic disease or primary lung neoplasm.  No other evidence of metastatic disease or abnormal FDG uptake.  2 cm left adrenal gland nodule, most compatible with adenoma.  6 mm pulmonary nodule at the right lung base in a background of scarring.  Non-FDG avid but too small to accurately characterize by PET scan.         5.   03/27/2020- seen for medical oncology by Dr. Ventura who referred her to Dr. Enriquez of pulmonary in assessment of tissue biopsy.       "   6.   05/27/2020- seen by Dr. Enriquez of pulmonary in assessment of the left upper lobe nodule.  Review of records from [facility] indicate prior history of lung cancer on the right side resected and more recently urothelial cancer of the  system.  All have been treated.  More recent surveillance CT identified an asymptomatic lesion in the left upper lobe adjacent to the fissure.  PET scan showed hypermetabolism in this lesion.  Patient was referred to [place] for possible navigational bronchoscopy/biopsy of the lesion that appears to be in the lingula.  Patient says she has received \"the maximum\" radiation for her lifetime.         7.   06/01/2020-CT chest.  Comparison 02/19/2020, 03/20/2020.  Impression: Slight increase in size of lingular nodule measuring 1.7 cm (previously 1.5 cm) concerning for malignancy.  Decreased size of right lower lobe pulmonary nodule now measures 0.3 cm (from 0.6 cm).  Stable postoperative changes in the right hilum.  Left nephrectomy.  Stable right adrenal low-density lesion, favored to represent an adenoma.         8.   06/10/2020- [facility] (Dr. Hare) bronchoscopy with biopsy.  Pathology: LUNG, LINGULA, TRANSBRONCHIAL BIOPSY:   AT LEAST SQUAMOUS CELL CARCINOMA IN SITU.  Immunohistochemistry demonstrates the neoplastic cells to be positive for p40 and negative for GATA3 and CK7, consistent with squamous differentiation.  LUNG NODULE, LINGULA, TRANSBRONCHIAL NEEDLE ASPIRATION WITH CELL BLOCK:   INVOLVED BY CARINOMA, FAVOR SQUAMOUS CELL CARCINOMA (SEE COMMENT).  Comment: Immunohistochemical stains performed with appropriate controls show that the tumor cells are positive for p40 and negative for ELBA-3 and TTF-1. While this immunophenotype and cytomorphology can be seen in both a primary squamous cell carcinoma of the lung and metastatic urothelial carcinoma, the lack of reactivity for ELBA-3 and " the presence of carcinoma in situ in the corresponding surgical biopsy [id number] would favor a primary lung carcinoma.            9.   06/10/2020-hemoglobin 12.6, hematocrit 37, MCV 95, platelets 346,000, WBC 10.3.  BMP with calcium of 8.1 otherwise normal with creatinine 0.99.      10.   07/20/2020- MRI brain w and wo contrast: No metastatic disease.  Mild chronic microvascular changes.      11. 08/25/2020- robotic lingular segmentectomy (Dr. Moshe aHnd).  Diagnosis: A.lung, lingular segmentectomy: Squamous cell carcinoma, 2.2 cm in greatest extent; negative for pleural invasion; surgical margins negative for tumor; 1 benign lymph node (see synoptic).  B.lymph node, level 9L excision: 1 benign lymph node.  C.lymph node, level 8L, excision: 2 benign lymph nodes.  D.lymph node, 7L, excision: 1 benign lymph node.  E.lymph node, level 5, excision: 3 benign lymph nodes.  F.lymph node, 12 L, excision: 1 benign lymph node.  G.lymph node, level 11 L, excision: 4 benign lymph nodes.  H.lymph node, 13 L, excision: At least one benign lymph node.  I.bronchial staple line: Unremarkable bronchus; negative for tumor.  J.lung, final parenchymal margin: Unremarkable lung parenchyma.  Synoptic: Lung specimen-procedure: Segmentectomy.  Specimen laterality: Left.  Spread through airspaces (STS): Not identified.  Total tumor size (size of entire tumor): Greatest dimension 2.2 cm.  Size of invasive component: 2.2 cm.  Tumor focality: Single focus.  Visceropleural invasion: Not identified.  Direct invasion of adjacent structures: No adjacent structures present.  Treatment effect: No known presurgical therapy.  Lymphovascular invasion: Not identified.  Margins: Uninvolved by tumor.  Lymph nodes: Number of lymph nodes involved: 0.  Number of lymph nodes examined: 14.  Lev stations examined: 7: Subcarinal, 5: Sub-aortic/aortopulmonary/AP window, 8L: Paraesophageal, 9L, pulmonary ligament, 11 L, interlobar, 12 L, lobar 13 L  segmental.  Pathologic stage classification (P TNM, AJCC eighth edition).  Primary tumor (pT): pT1c, regional lymph nodes (pN): pN0.  Likely insufficient material for molecular testing.    LABS    Lab Results - Last 18 Months   Lab Units 03/21/24  0932 09/21/23  1456 03/15/23  0903   HEMOGLOBIN g/dL 13.5 13.2 14.2   HEMATOCRIT % 40.1 39.8 43.4   MCV fL 95.0 95.9 97.3*   WBC 10*3/mm3 10.62 10.94* 10.18   RDW % 14.0 14.0 14.4   MPV fL 9.8 9.9 9.8   PLATELETS 10*3/mm3 401 380 397   IMM GRAN % % 0.4 0.3 0.3   NEUTROS ABS 10*3/mm3 6.61 6.68 4.91   LYMPHS ABS 10*3/mm3 2.78 3.35* 3.89*   MONOS ABS 10*3/mm3 0.81 0.72 0.87   EOS ABS 10*3/mm3 0.32 0.09 0.39   BASOS ABS 10*3/mm3 0.06 0.07 0.09   IMMATURE GRANS (ABS) 10*3/mm3 0.04 0.03 0.03   NRBC /100 WBC 0.0 0.0 0.0       Lab Results - Last 18 Months   Lab Units 03/21/24  0932 09/21/23  1456 03/15/23  0903   GLUCOSE mg/dL 92 98 103*   SODIUM mmol/L 143 140 140   POTASSIUM mmol/L 4.1 4.2 4.1   CO2 mmol/L 28.0 25.0 26.0   CHLORIDE mmol/L 105 103 101   ANION GAP mmol/L 10.0 12.0 13.0   CREATININE mg/dL 0.93 1.01* 1.01*   BUN mg/dL 17 20 25*   BUN / CREAT RATIO  18.3 19.8 24.8   CALCIUM mg/dL 9.5 9.4 9.3   ALK PHOS U/L 82 79 76   TOTAL PROTEIN g/dL 6.9 6.7 7.4   ALT (SGPT) U/L 14 11 15   AST (SGOT) U/L 20 17 19   BILIRUBIN mg/dL 0.4 0.2 0.4   ALBUMIN g/dL 4.2 4.1 4.4   GLOBULIN gm/dL 2.7 2.6 3.0         PAST MEDICAL HISTORY:  ALLERGIES:  Allergies   Allergen Reactions    Iodides Hives and Itching     HIVES AND ITCHING POST CT STUDY ON 9/4/15     2-24-16 WITH PREMEDICATION PT STILL HAD A REACTION WITH HIVES AND THROAT TIGHTENING   HIVES AND ITCHING POST CT STUDY ON 9/4/15     2-24-16 WITH PREMEDICATION PT STILL HAD A REACTION WITH HIVES AND THROAT TIGHTENING       Iodinated Contrast Media Hives     CURRENT MEDICATIONS:  Outpatient Encounter Medications as of 5/2/2024   Medication Sig Dispense Refill    ALPRAZolam (XANAX) 0.5 MG tablet Take 1 tablet by mouth At Night As Needed.       Biotin 5 MG capsule Take  by mouth.      cholecalciferol (VITAMIN D3) 25 MCG (1000 UT) tablet Take 1 tablet by mouth Daily.      Ginkgo Biloba Extract 60 MG capsule Take  by mouth.      Misc Natural Products (NEURIVA PO) Take  by mouth.      multivitamin with minerals tablet tablet Take 1 tablet by mouth Daily.      naproxen sodium (ALEVE) 220 MG tablet Take 1 tablet by mouth 2 (Two) Times a Day As Needed.      SUPER B COMPLEX/C PO Take  by mouth Daily.      terbinafine (lamISIL) 1 % cream Apply 1 Application topically to the appropriate area as directed 2 (Two) Times a Day.      traZODone (DESYREL) 50 MG tablet Take 1 tablet by mouth Daily As Needed.      [DISCONTINUED] melatonin 3 MG tablet Take 1 tablet by mouth Every Night. (Patient not taking: Reported on 2024)      [DISCONTINUED] ST JOHNS WORT EXTRACT PO Take  by mouth. (Patient not taking: Reported on 2024)       No facility-administered encounter medications on file as of 2024.       Adult illnesses:  Left urothelial carcinoma, metastatic with positive supraclavicular lymph node biopsy, 2009.  Lung cancer  Hives after CT contrast  Active tobacco use (55+ years)    Past surgeries:  Port-A-Cath placement, 3/27/2020   section  Left nephrectomy/adrenalectomy  Right upper lobe lung resection,   Appendectomy  Cholecystectomy  Splenectomy  Hernia repair  Cataracts removed with lenses  Blepharoplasty, OU  06/10/2020- transbronchial needle aspiration of lingular lung nodule ([place]).  Carcinoma, favor squamous cell carcinoma.    ADULT ILLNESSES:  Patient Active Problem List   Diagnosis Code    Abnormal Pap smear, low grade squamous intraepithelial lesion (LGSIL) R87.612    Acute bilateral back pain M54.9    Adenopathy, cervical R59.0    Allergic rhinitis J30.9    Benign neoplasm of adrenal gland D35.00    Bilateral cataracts H26.9    Bilateral hearing loss H91.93    Bilateral impacted cerumen H61.23    Carpal tunnel syndrome  G56.00    Cervical high risk HPV (human papillomavirus) test positive R87.810    Diverticulosis of large intestine K57.30    Encounter for health-related screening Z13.9    Leiomyoma of uterus, unspecified D25.9    H/O splenectomy Z90.81    Malignant neoplasm of kidney C64.9    Malignant neoplasm of lung C34.90    Metastatic cancer to cervical lymph nodes C77.0    Neck mass R22.1    Plantar fasciitis M72.2    Pulmonary nodule R91.1    Restless legs syndrome (RLS) G25.81    Malignant neoplasm C80.1    Encounter for care related to Port-a-Cath Z45.2    History of lung cancer Z85.118    Metastatic renal cell carcinoma C64.9    Other nonspecific abnormal finding R68.89    Current every day smoker F17.200    History of lobectomy of lung Z90.2     SURGERIES:  Past Surgical History:   Procedure Laterality Date    ABDOMINAL SURGERY      APPENDECTOMY       SECTION      CHOLECYSTECTOMY      HERNIA REPAIR      LUNG REMOVAL, PARTIAL Right     NEPHRECTOMY Left      HEALTH MAINTENANCE ITEMS:  Health Maintenance Due   Topic Date Due    DXA SCAN  Never done    COLORECTAL CANCER SCREENING  Never done    ZOSTER VACCINE (1 of 2) Never done    RSV Vaccine - Adults (1 - 1-dose 60+ series) Never done    HEPATITIS C SCREENING  Never done    BMI FOLLOWUP  2021    MAMMOGRAM  2021    COVID-19 Vaccine (3 - Moderna risk series) 2022    ANNUAL WELLNESS VISIT  2022       <no information>  Last Completed Colonoscopy       This patient has no relevant Health Maintenance data.          Immunization History   Administered Date(s) Administered    COVID-19 (MODERNA) 1st,2nd,3rd Dose Monovalent 2021, 2021    FLUAD TRI 65YR+ 2019    Fluzone High Dose =>65 Years ([employer] ONLY) 2019    H1N1 Inj 2009    Influenza, Unspecified 10/09/2012, 10/08/2013, 10/08/2013, 10/08/2014, 10/12/2015, 10/13/2016    Pneumococcal Conjugate 13-Valent (PCV13) 09/15/2016    Pneumococcal Polysaccharide (PPSV23)  "05/16/2018    Tdap 05/16/2018     Last Completed Mammogram       This patient has no relevant Health Maintenance data.              FAMILY HISTORY:  Family History   Problem Relation Age of Onset    No Known Problems Mother     No Known Problems Father     Cancer Sister     No Known Problems Maternal Grandmother     No Known Problems Maternal Grandfather     No Known Problems Paternal Grandmother     No Known Problems Paternal Grandfather     No Known Problems Brother     No Known Problems Maternal Aunt     No Known Problems Maternal Uncle     No Known Problems Paternal Aunt     No Known Problems Paternal Uncle     No Known Problems Other     Asthma Neg Hx     Diabetes Neg Hx     Emphysema Neg Hx     Heart failure Neg Hx     Hypertension Neg Hx      SOCIAL HISTORY:  Social History     Socioeconomic History    Marital status:    Tobacco Use    Smoking status: Every Day     Current packs/day: 0.75     Average packs/day: 0.8 packs/day for 58.3 years (43.8 ttl pk-yrs)     Types: Cigarettes     Start date: 1966    Smokeless tobacco: Never    Tobacco comments:     half a pack now   Substance and Sexual Activity    Alcohol use: Not Currently    Drug use: Never    Sexual activity: Defer       REVIEW OF SYSTEMS:  Review of Systems   Constitutional:  Positive for appetite change (\"been less\") and fatigue (\"tired but I manage.\"). Negative for activity change, chills, diaphoresis and fever.        Manages all her ADLs, including chores, and driving.  Lives alone. 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  Says she is still active and \"am still up and about all the time but tire sometimes\"   HENT: Negative.     Eyes: Negative.    Respiratory:  Positive for cough (productive of clear/cream/discolored colored/never blood tinged - phlegm) and shortness of breath (Admits to MEHTA, occasional SOB with her routine activities. ). Negative for chest tightness and wheezing.         Is still smoking > 1 ppd - " "smoker since age 15.  \"Sometimes more\"   Cardiovascular: Negative.    Gastrointestinal:  Negative for constipation (Modulated by senna/Miralax).   Genitourinary: Negative.    Musculoskeletal:  Positive for arthralgias (shoulders, knees, back, left hip, hands.  \"Not new\"). Negative for back pain (Left side radicular symptoms have been quiescent), joint swelling, myalgias and neck pain.   Skin: Negative.    Allergic/Immunologic: Positive for environmental allergies (\"pollen\").   Neurological:  Positive for numbness (left hand, \"carpal tunnel\"). Negative for dizziness.   Hematological: Negative.    Psychiatric/Behavioral:  Positive for depressed mood (\"on and off.\"). The patient is nervous/anxious.        /62   Pulse 88   Temp 97.3 °F (36.3 °C) (Temporal)   Resp 18   Ht 152.4 cm (60\")   Wt 61.3 kg (135 lb 3.2 oz)   SpO2 95%   BMI 26.40 kg/m²  Body surface area is 1.58 meters squared.  Pain Score    05/02/24 1309   PainSc: 0-No pain         Physical Exam:  Physical Exam   Constitutional: She is oriented to person, place, and time. No distress.   Pleasant, heavy set, modestly kept elderly female.  Ambulatory.  ECOG 1.      She has lost 7 lb (4 lb at her prior visit) since her last visit.   HENT:   Head: Normocephalic and atraumatic.   Scarring of the left supraclavicular area from prior neck surgery   Eyes: Pupils are equal, round, and reactive to light. Conjunctivae are normal. No scleral icterus.   Neck: No JVD present. No tracheal deviation present. No thyromegaly present.   Cardiovascular: Normal rate and regular rhythm. Exam reveals no friction rub.   Pulmonary/Chest: No stridor. She has wheezes (soft, scattered-unchanged). She has no rales.   Distant breath sounds    Healed thoracotomy incisions   Abdominal: Soft. Bowel sounds are normal. She exhibits no distension and no mass. There is no abdominal tenderness. There is no rebound and no guarding.   Musculoskeletal: Normal range of motion. Deformity " (Osteoarthritic changes of the hands) present.   Lymphadenopathy:     She has no cervical adenopathy.   Neurological: She is alert and oriented to person, place, and time.   Skin: Skin is warm and dry.   Psychiatric: Her behavior is normal. Mood and thought content normal.   Vitals reviewed.      Assessment:  1.   Squamous cell lung carcinoma            Tumor stage: pTNM IA3 (pT1c,pN0, M0)            PET Tumor [place]: 1.7 cm nodule in the lingula.  No other PET evidence for metastatic disease.            Complications of tumor: None            Tumor status: Untreated.              -- Radiographically progressing (has grown to 1.7cm on chest CT, 06/01/2020 from 1.5 cm on chest CT, 02/19/2020).            -- 08/25/2020-robotic lingular segmentectomy (Dr. Moshe Hand).  Diagnosis: Squamous cell carcinoma, 2.2 cm in greatest extent; negative pleural invasion; surgical margins negative for tumor; 1 benign lymph node (1/14). pTNM: pT1c, pN0            --01/19/2021-CT chest.  Status post bilateral lung surgery with postop changes.  No CT evidence of malignancy or acute cardiopulmonary process.  Mild induration of the left axilla with small lymph nodes, significance uncertain.  Consider adenitis.  Stable low-attenuation right adrenal mass.  Changes from left nephrectomy.           --07/19/2021-CT chest.  Postsurgical changes.  No CT evidence of developing malignancy.  No acute cardiopulmonary process.           --12/27/2021-CT chest-stable treatment-related changes.  No evidence of new and/or recurrent disease.  Advanced coronary artery atheromatous calcification.           --09/01/2022- CT chest-no new or enlarging pulmonary nodules.  No evidence of new and/or recurrent disease.  Advanced coronary atherosclerosis and postoperative changes to the chest.           --2/22/2023- CT chest-  1. Stable appearance of the chest from previous exam of 09/01/2022 with no radiographic evidence of recurrence or metastatic disease. 2.  Changes of centrilobular emphysema. No evidence of consolidative pneumonia or effusion. No developing mediastinal or axillary lymphadenopathy.3. Stable hypodense right adrenal nodule likely representing an adenoma..          --9/21/23- CT chest-  No evidence of disease recurrence or progression. Similar posttreatment changes of the bilateral chest. Moderate emphysematous changes. Heavily calcified coronary arteries persistent material. Small hiatal hernia. Similar low-density enlargement of the RIGHT adrenal gland as far back as 7/19/2021, favored to represent an adrenal adenoma.          -- 3/21/24- CT chest- Postsurgical/postprocedural changes in the upper lobes bilaterally. A stable CT scan of the chest. No finding to suggest a neoplastic process, tumor recurrence or metastatic disease. The remaining nonacute findings in the chest and proximal abdomen are similar to the previous study.             2.   History of non-small cell lung cancer in the right upper lobe, status post right upper lobectomy, 2004.  No details  3.   History of metastatic urothelial carcinoma with positive supraclavicular lymph node biopsy and left nephrectomy, 2009.  Details unclear but apparently received chemotherapy and radiation.  4.   Tobacco use, 57+ years cigarette smoking  5.   Chronic kidney disease, stage III.    --GFR 64.6 mL/min, 3/21/24 (prior: 51-61.5 mL/min)    6.   Abnormal CEA.  Tobacco smoker.  Needs follow-up.  --10.4, 3/21/24 (prior: 8.54-9.85)    7.   Anxiety. Chronic  8.   Low-grade neutrophilic leukocytosis  --WBC 10.6, 3/21/24.  Reactive to tobacco smoking associated bronchitis?      Recommendations:  1.   Draw labs today- Apprised of labs, 3/21/24 with normal CBC, depressed (stable) GFR, otherwise normal CMP, CEA 10.4 (prior: 7.69-9.85).    2.   Apprised of chest CT, 3/21/24 (above).  DWIGHT    3.   Note that we have requested pathology at [place] to send out biopsy specimen on 6/10/2020: EGFR, ALK, ROS 1, PDL1,  BRAF-7th request- no results.  4.   Review NCCN guidelines version 6.2020 non-small cell lung cancer-stage IA (peripheral T1 abc, N0).  If operable: Surgical exploration and resection plus mediastinal lymph node dissection/sampling.  Adjuvant chemotherapy only if node positive.  If margins negative (R0)-observation.  Surveillance- H&P and chest CT +/- contrast every 6 months for 2 to 3 years then H&P and low-dose noncontrast enhanced chest CT annually..  5.   Schedule CT chest without contrast in [facility].  Compare to prior studies.   6.   Return to office in 24 weeks with (specify fasting) pre-office CMP, CBC with differential, and CEA.  7.   Importance of Smoking Cessation discussed with patient and informed patient additional information will be on today's [facility]'s Flyer - Plan to Be Tobacco Free handout provided to patient     I spent ~33 minutes caring for Jennie on this date of service. This time includes time spent by me in the following activities: preparing for the visit, reviewing tests, performing a medically appropriate examination and/or evaluation, counseling and educating the patient/family/caregiver, ordering medications, tests, or procedures and documenting information in the medical record

## 2024-04-30 ENCOUNTER — TELEPHONE (OUTPATIENT)
Dept: ONCOLOGY | Facility: CLINIC | Age: 74
End: 2024-04-30
Payer: MEDICARE

## 2024-04-30 NOTE — TELEPHONE ENCOUNTER
Caller: Jennie Molina    Relationship: Self    Best call back number: [phone] CAN L/M     What is the best time to reach you: ANYTIME    Who are you requesting to speak with (clinical staff, provider,  specific staff member): CLINICAL     What was the call regarding: PATIENT HAD LAB WORK ON 3/21/2024 WILL SHE NEED ANY MORE LABS DRAWN FOR HER 5/2/2024 APPT?  CALL TO DISCUSS

## 2024-05-02 ENCOUNTER — OFFICE VISIT (OUTPATIENT)
Dept: ONCOLOGY | Facility: CLINIC | Age: 74
End: 2024-05-02
Payer: MEDICARE

## 2024-05-02 ENCOUNTER — LAB (OUTPATIENT)
Dept: LAB | Facility: HOSPITAL | Age: 74
End: 2024-05-02
Payer: MEDICARE

## 2024-05-02 VITALS
SYSTOLIC BLOOD PRESSURE: 116 MMHG | TEMPERATURE: 97.3 F | DIASTOLIC BLOOD PRESSURE: 62 MMHG | HEART RATE: 88 BPM | RESPIRATION RATE: 18 BRPM | BODY MASS INDEX: 26.55 KG/M2 | WEIGHT: 135.2 LBS | OXYGEN SATURATION: 95 % | HEIGHT: 60 IN

## 2024-05-02 DIAGNOSIS — C34.92 MALIGNANT NEOPLASM OF LEFT LUNG, UNSPECIFIED PART OF LUNG: Primary | ICD-10-CM

## 2024-05-02 DIAGNOSIS — C34.92 MALIGNANT NEOPLASM OF LEFT LUNG, UNSPECIFIED PART OF LUNG: ICD-10-CM

## 2024-05-02 LAB
ALBUMIN SERPL-MCNC: 4.3 G/DL (ref 3.5–5.2)
ALBUMIN/GLOB SERPL: 1.5 G/DL
ALP SERPL-CCNC: 76 U/L (ref 39–117)
ALT SERPL W P-5'-P-CCNC: 16 U/L (ref 1–33)
ANION GAP SERPL CALCULATED.3IONS-SCNC: 9 MMOL/L (ref 5–15)
AST SERPL-CCNC: 19 U/L (ref 1–32)
BASOPHILS # BLD AUTO: 0.04 10*3/MM3 (ref 0–0.2)
BASOPHILS NFR BLD AUTO: 0.4 % (ref 0–1.5)
BILIRUB SERPL-MCNC: 0.2 MG/DL (ref 0–1.2)
BUN SERPL-MCNC: 15 MG/DL (ref 8–23)
BUN/CREAT SERPL: 14.9 (ref 7–25)
CALCIUM SPEC-SCNC: 9.3 MG/DL (ref 8.6–10.5)
CHLORIDE SERPL-SCNC: 101 MMOL/L (ref 98–107)
CO2 SERPL-SCNC: 27 MMOL/L (ref 22–29)
CREAT SERPL-MCNC: 1.01 MG/DL (ref 0.57–1)
DEPRECATED RDW RBC AUTO: 48.7 FL (ref 37–54)
EGFRCR SERPLBLD CKD-EPI 2021: 58.5 ML/MIN/1.73
EOSINOPHIL # BLD AUTO: 0.1 10*3/MM3 (ref 0–0.4)
EOSINOPHIL NFR BLD AUTO: 1 % (ref 0.3–6.2)
ERYTHROCYTE [DISTWIDTH] IN BLOOD BY AUTOMATED COUNT: 14.1 % (ref 12.3–15.4)
GLOBULIN UR ELPH-MCNC: 2.8 GM/DL
GLUCOSE SERPL-MCNC: 132 MG/DL (ref 65–99)
HCT VFR BLD AUTO: 39.6 % (ref 34–46.6)
HGB BLD-MCNC: 13.5 G/DL (ref 12–15.9)
IMM GRANULOCYTES # BLD AUTO: 0.03 10*3/MM3 (ref 0–0.05)
IMM GRANULOCYTES NFR BLD AUTO: 0.3 % (ref 0–0.5)
LYMPHOCYTES # BLD AUTO: 2.77 10*3/MM3 (ref 0.7–3.1)
LYMPHOCYTES NFR BLD AUTO: 28.3 % (ref 19.6–45.3)
MCH RBC QN AUTO: 32.1 PG (ref 26.6–33)
MCHC RBC AUTO-ENTMCNC: 34.1 G/DL (ref 31.5–35.7)
MCV RBC AUTO: 94.3 FL (ref 79–97)
MONOCYTES # BLD AUTO: 0.64 10*3/MM3 (ref 0.1–0.9)
MONOCYTES NFR BLD AUTO: 6.5 % (ref 5–12)
NEUTROPHILS NFR BLD AUTO: 6.22 10*3/MM3 (ref 1.7–7)
NEUTROPHILS NFR BLD AUTO: 63.5 % (ref 42.7–76)
NRBC BLD AUTO-RTO: 0 /100 WBC (ref 0–0.2)
PLATELET # BLD AUTO: 379 10*3/MM3 (ref 140–450)
PMV BLD AUTO: 9.7 FL (ref 6–12)
POTASSIUM SERPL-SCNC: 4.2 MMOL/L (ref 3.5–5.2)
PROT SERPL-MCNC: 7.1 G/DL (ref 6–8.5)
RBC # BLD AUTO: 4.2 10*6/MM3 (ref 3.77–5.28)
SODIUM SERPL-SCNC: 137 MMOL/L (ref 136–145)
WBC NRBC COR # BLD AUTO: 9.8 10*3/MM3 (ref 3.4–10.8)

## 2024-05-02 PROCEDURE — 82378 CARCINOEMBRYONIC ANTIGEN: CPT

## 2024-05-02 PROCEDURE — 80053 COMPREHEN METABOLIC PANEL: CPT

## 2024-05-02 PROCEDURE — 36415 COLL VENOUS BLD VENIPUNCTURE: CPT

## 2024-05-02 PROCEDURE — 85025 COMPLETE CBC W/AUTO DIFF WBC: CPT

## 2024-05-02 RX ORDER — PRENATAL VIT 91/IRON/FOLIC/DHA 28-975-200
1 COMBINATION PACKAGE (EA) ORAL 2 TIMES DAILY
COMMUNITY

## 2024-05-03 LAB — CEA SERPL-MCNC: 10.6 NG/ML

## 2024-05-13 ENCOUNTER — OFFICE VISIT (OUTPATIENT)
Dept: ENT CLINIC | Age: 74
End: 2024-05-13
Payer: MEDICARE

## 2024-05-13 VITALS
WEIGHT: 143 LBS | BODY MASS INDEX: 28.83 KG/M2 | SYSTOLIC BLOOD PRESSURE: 132 MMHG | DIASTOLIC BLOOD PRESSURE: 70 MMHG | HEIGHT: 59 IN

## 2024-05-13 DIAGNOSIS — H61.23 BILATERAL IMPACTED CERUMEN: Primary | ICD-10-CM

## 2024-05-13 PROCEDURE — 69210 REMOVE IMPACTED EAR WAX UNI: CPT | Performed by: PHYSICIAN ASSISTANT

## 2024-05-13 PROCEDURE — 99212 OFFICE O/P EST SF 10 MIN: CPT | Performed by: PHYSICIAN ASSISTANT

## 2024-05-13 PROCEDURE — 1123F ACP DISCUSS/DSCN MKR DOCD: CPT | Performed by: PHYSICIAN ASSISTANT

## 2024-05-13 NOTE — PROGRESS NOTES
Mrs. Mcbride is a pleasant 73-year-old  female that presents for a 6-month cerumen check.  Patient reports that she has noticed gradual diminished hearing with preestablished asymmetrical hearing loss of the left ear.  She reports that she is wearing hearing aids and believes this is contributing to her impaction.      Physical examination with microscope confirmed bilateral cerumen impactions.  This successfully removed with alligator graspers with no complications.-Please refer to separate dictated procedure note  After disimpaction TMs appear to be normal.  Neck exam demonstrated no lymphadenopathy or thyromegaly.      Impression: Successful cerumen disimpaction with microscopy and instrumentation-bilateral    Plan: Patient is to follow-up in 6 months for cerumen check.  She was reminded to call if she has any questions or concerns.      Electronically signed by TARAS CABRERA PA-C on 5/13/24 at 10:36 AM CDT

## 2024-10-24 ENCOUNTER — TELEPHONE (OUTPATIENT)
Dept: ONCOLOGY | Facility: CLINIC | Age: 74
End: 2024-10-24

## 2024-10-24 NOTE — TELEPHONE ENCOUNTER
Caller: Jennie Molina    Relationship: Self    Best call back number: [phone]    What is the best time to reach you: ANYTIME    Who are you requesting to speak with (clinical staff, provider,  specific staff member): CLINICAL    What was the call regarding: PT IS WANTING TO KNOW IF SHE NEEDS TO HAVE LABS PRIOR TO HER APPOINTMENT ON 10-31    PLEASE ADVISE

## 2024-10-26 NOTE — PROGRESS NOTES
[facility] HEMATOLOGY AND ONCOLOGY  [address]  [place] [facility] [place] [address] [phone]    Patient Name: Jennie Molina  Encounter Date: 10/31/2024  YOB: 1950  Patient Number: [id number]      REASON FOR VISIT: Jennie Molina is a 73-year-old female who returns in follow-up of 2 lung cancers: Squamous cell carcinoma in the right upper lobe, status post partial resection, 2004. On 08/25/2020 (50 months) -underwent robotic lingular segmentectomy for squamous cell carcinoma, 2.2 cm in greatest extent; negative pleural invasion; surgical margins negative for tumor; 1 benign lymph node (1/14). pTNM: pT1c, pN0.  She is here alone.    I have reviewed the HPI and verified with the patient the accuracy of it. No changes to interval history since the information was documented. Adelfo Grider MD 10/31/24      DIAGNOSTIC ABNORMALITIES/PREVIOUS INTERVENTIONS:         1.   History of active tobacco use (55+ years smoking history), urothelial carcinoma with positive supraclavicular lymph node biopsy, and left nephrectomy, 2009.  Had received chemotherapy and radiation.          -    2004 - History of right lung cancer:   Station 7 node, biopsy:    Negative for carcinoma.  Station 4, biopsy:    Negative for carcinoma.  Lung, right upper lobe, mass, excisional biopsy:   Poorly differentiated non-small cell carcinoma (see microscopic description).  Lymph node, station 7, resection:  Thirteen lymph nodes, negative for tumor (0/13).  Lymph node, station 4R, resection:  Eighteen lymph nodes, negative for tumor (0/18).  Lymph node, station 11R, resection:  Four lymph nodes, negative for tumor (0/4).  Rib, 5th, resection:    Negative for carcinoma.  Lymph node, station 10R, resection:  One lymph node, negative for tumor (0/1).  Right upper lobe mass, excisional biopsy:  Poorly differentiated non small cell carcinoma; see Tumor Characteristics and  comment.  Tumor extends to the inked pleural surface.  Right upper lobe, lobectomy:  Focal hemorrhage and collapse.  Negative for carcinoma.                     -    2009 - History of urothelial carcinoma:   10/21/2009:  Left kidney, radial nephrectomy:  High grade urothelial carcinoma with extensive necrosis.  Tumor arises in renal pelvis (in situ carcinoma) and invades through kidney to perinephric fat.  Lymphovascular invasion: Not identified.  Margins: Negative.  One lymph node, negative for malignancy (0/1).  Left hilar lymph node:  One lymph node, negative for malignancy (0/1).  Spleen:  Spleen without significant pathologic change           -    11/13/2009:  Left supraclavicular lymph node:  Metastatic high grade carcinoma involving one of four lymph nodes (1/4), see comment.           2.   Surveillance CTs in October 2019 was found to have a new 5 mm nodule in the left lingula.         3.   02/19/2020-CT chest.  Impression: Largest 1.5 cm nodule at the lingula and new 5 mm pulmonary nodule (reported on outside scan not available).  Changes of right upper lobe resection.  Emphysema.  Right hydronephrosis versus extrarenal pelvis (outside imaging report 9/18/2019 indicates extrarenal pelvis but images not available for comparison).  Right adrenal nodule, incompletely characterized.  Prior splenectomy.  Possible left adrenalectomy and nephrectomy.         4.   03/03/2020-PET scan.  Hypermetabolic 1.5 cm pulmonary nodule in the lingula, highly suspicious for metastatic disease or primary lung neoplasm.  No other evidence of metastatic disease or abnormal FDG uptake.  2 cm left adrenal gland nodule, most compatible with adenoma.  6 mm pulmonary nodule at the right lung base in a background of scarring.  Non-FDG avid but too small to accurately characterize by PET scan.         5.   03/27/2020- seen for medical oncology by Dr. Ventura who referred her to Dr. Enriquez of pulmonary in assessment of tissue biopsy.       "   6.   05/27/2020- seen by Dr. Enriquez of pulmonary in assessment of the left upper lobe nodule.  Review of records from [facility] indicate prior history of lung cancer on the right side resected and more recently urothelial cancer of the  system.  All have been treated.  More recent surveillance CT identified an asymptomatic lesion in the left upper lobe adjacent to the fissure.  PET scan showed hypermetabolism in this lesion.  Patient was referred to [place] for possible navigational bronchoscopy/biopsy of the lesion that appears to be in the lingula.  Patient says she has received \"the maximum\" radiation for her lifetime.         7.   06/01/2020-CT chest.  Comparison 02/19/2020, 03/20/2020.  Impression: Slight increase in size of lingular nodule measuring 1.7 cm (previously 1.5 cm) concerning for malignancy.  Decreased size of right lower lobe pulmonary nodule now measures 0.3 cm (from 0.6 cm).  Stable postoperative changes in the right hilum.  Left nephrectomy.  Stable right adrenal low-density lesion, favored to represent an adenoma.         8.   06/10/2020- [facility] (Dr. Hare) bronchoscopy with biopsy.  Pathology: LUNG, LINGULA, TRANSBRONCHIAL BIOPSY:   AT LEAST SQUAMOUS CELL CARCINOMA IN SITU.  Immunohistochemistry demonstrates the neoplastic cells to be positive for p40 and negative for GATA3 and CK7, consistent with squamous differentiation.  LUNG NODULE, LINGULA, TRANSBRONCHIAL NEEDLE ASPIRATION WITH CELL BLOCK:   INVOLVED BY CARINOMA, FAVOR SQUAMOUS CELL CARCINOMA (SEE COMMENT).  Comment: Immunohistochemical stains performed with appropriate controls show that the tumor cells are positive for p40 and negative for ELBA-3 and TTF-1. While this immunophenotype and cytomorphology can be seen in both a primary squamous cell carcinoma of the lung and metastatic urothelial carcinoma, the lack of reactivity for ELBA-3 and " the presence of carcinoma in situ in the corresponding surgical biopsy [id number] would favor a primary lung carcinoma.            9.   06/10/2020-hemoglobin 12.6, hematocrit 37, MCV 95, platelets 346,000, WBC 10.3.  BMP with calcium of 8.1 otherwise normal with creatinine 0.99.      10.   07/20/2020- MRI brain w and wo contrast: No metastatic disease.  Mild chronic microvascular changes.      11. 08/25/2020- robotic lingular segmentectomy (Dr. Moshe Hand).  Diagnosis: A.lung, lingular segmentectomy: Squamous cell carcinoma, 2.2 cm in greatest extent; negative for pleural invasion; surgical margins negative for tumor; 1 benign lymph node (see synoptic).  B.lymph node, level 9L excision: 1 benign lymph node.  C.lymph node, level 8L, excision: 2 benign lymph nodes.  D.lymph node, 7L, excision: 1 benign lymph node.  E.lymph node, level 5, excision: 3 benign lymph nodes.  F.lymph node, 12 L, excision: 1 benign lymph node.  G.lymph node, level 11 L, excision: 4 benign lymph nodes.  H.lymph node, 13 L, excision: At least one benign lymph node.  I.bronchial staple line: Unremarkable bronchus; negative for tumor.  J.lung, final parenchymal margin: Unremarkable lung parenchyma.  Synoptic: Lung specimen-procedure: Segmentectomy.  Specimen laterality: Left.  Spread through airspaces (STS): Not identified.  Total tumor size (size of entire tumor): Greatest dimension 2.2 cm.  Size of invasive component: 2.2 cm.  Tumor focality: Single focus.  Visceropleural invasion: Not identified.  Direct invasion of adjacent structures: No adjacent structures present.  Treatment effect: No known presurgical therapy.  Lymphovascular invasion: Not identified.  Margins: Uninvolved by tumor.  Lymph nodes: Number of lymph nodes involved: 0.  Number of lymph nodes examined: 14.  Lev stations examined: 7: Subcarinal, 5: Sub-aortic/aortopulmonary/AP window, 8L: Paraesophageal, 9L, pulmonary ligament, 11 L, interlobar, 12 L, lobar 13 L  segmental.  Pathologic stage classification (P TNM, AJCC eighth edition).  Primary tumor (pT): pT1c, regional lymph nodes (pN): pN0.  Likely insufficient material for molecular testing.    LABS    Lab Results - Last 18 Months   Lab Units 10/31/24  1022 05/02/24  1356 03/21/24  0932 09/21/23  1456   HEMOGLOBIN g/dL 14.0 13.5 13.5 13.2   HEMATOCRIT % 42.5 39.6 40.1 39.8   MCV fL 95.5 94.3 95.0 95.9   WBC 10*3/mm3 10.33 9.80 10.62 10.94*   RDW % 13.8 14.1 14.0 14.0   MPV fL 9.6 9.7 9.8 9.9   PLATELETS 10*3/mm3 405 379 401 380   IMM GRAN % % 0.3 0.3 0.4 0.3   NEUTROS ABS 10*3/mm3 6.67 6.22 6.61 6.68   LYMPHS ABS 10*3/mm3 2.52 2.77 2.78 3.35*   MONOS ABS 10*3/mm3 0.73 0.64 0.81 0.72   EOS ABS 10*3/mm3 0.31 0.10 0.32 0.09   BASOS ABS 10*3/mm3 0.07 0.04 0.06 0.07   IMMATURE GRANS (ABS) 10*3/mm3 0.03 0.03 0.04 0.03   NRBC /100 WBC 0.0 0.0 0.0 0.0       Lab Results - Last 18 Months   Lab Units 05/02/24  1356 03/21/24  0932 09/21/23  1456   GLUCOSE mg/dL 132* 92 98   SODIUM mmol/L 137 143 140   POTASSIUM mmol/L 4.2 4.1 4.2   CO2 mmol/L 27.0 28.0 25.0   CHLORIDE mmol/L 101 105 103   ANION GAP mmol/L 9.0 10.0 12.0   CREATININE mg/dL 1.01* 0.93 1.01*   BUN mg/dL 15 17 20   BUN / CREAT RATIO  14.9 18.3 19.8   CALCIUM mg/dL 9.3 9.5 9.4   ALK PHOS U/L 76 82 79   TOTAL PROTEIN g/dL 7.1 6.9 6.7   ALT (SGPT) U/L 16 14 11   AST (SGOT) U/L 19 20 17   BILIRUBIN mg/dL 0.2 0.4 0.2   ALBUMIN g/dL 4.3 4.2 4.1   GLOBULIN gm/dL 2.8 2.7 2.6         PAST MEDICAL HISTORY:  ALLERGIES:  Allergies   Allergen Reactions    Iodides Hives and Itching     HIVES AND ITCHING POST CT STUDY ON 9/4/15     2-24-16 WITH PREMEDICATION PT STILL HAD A REACTION WITH HIVES AND THROAT TIGHTENING   HIVES AND ITCHING POST CT STUDY ON 9/4/15     2-24-16 WITH PREMEDICATION PT STILL HAD A REACTION WITH HIVES AND THROAT TIGHTENING       Iodinated Contrast Media Hives     CURRENT MEDICATIONS:  Outpatient Encounter Medications as of 10/31/2024   Medication Sig Dispense  Refill    DANIE SYRUP PO Take  by mouth. Gummy      ALPRAZolam (XANAX) 0.5 MG tablet Take 1 tablet by mouth At Night As Needed.      Biotin 5 MG capsule Take  by mouth.      Ginkgo Biloba Extract 60 MG capsule Take  by mouth.      Misc Natural Products (NEURIVA PO) Take  by mouth.      multivitamin with minerals tablet tablet Take 1 tablet by mouth Daily.      naproxen sodium (ALEVE) 220 MG tablet Take 1 tablet by mouth 2 (Two) Times a Day As Needed.      SUPER B COMPLEX/C PO Take  by mouth Daily.      traZODone (DESYREL) 50 MG tablet Take 1 tablet by mouth Daily As Needed.      varenicline (CHANTIX) 1 MG tablet Take 1 tablet by mouth 2 (Two) Times a Day.      [DISCONTINUED] cholecalciferol (VITAMIN D3) 25 MCG (1000 UT) tablet Take 1 tablet by mouth Daily. (Patient not taking: Reported on 10/31/2024)      [DISCONTINUED] terbinafine (lamISIL) 1 % cream Apply 1 Application topically to the appropriate area as directed 2 (Two) Times a Day. (Patient not taking: Reported on 10/31/2024)       No facility-administered encounter medications on file as of 10/31/2024.       Adult illnesses:  Left urothelial carcinoma, metastatic with positive supraclavicular lymph node biopsy, 2009.  Lung cancer  Hives after CT contrast  Active tobacco use (55+ years)    Past surgeries:  Port-A-Cath placement, 3/27/2020   section  Left nephrectomy/adrenalectomy  Right upper lobe lung resection, 2004  Appendectomy  Cholecystectomy  Splenectomy  Hernia repair  Cataracts removed with lenses  Blepharoplasty, OU  06/10/2020- transbronchial needle aspiration of lingular lung nodule ([facility]).  Carcinoma, favor squamous cell carcinoma.    ADULT ILLNESSES:  Patient Active Problem List   Diagnosis Code    Abnormal Pap smear, low grade squamous intraepithelial lesion (LGSIL) R87.612    Acute bilateral back pain M54.9    Adenopathy, cervical R59.0    Allergic rhinitis J30.9    Benign neoplasm of adrenal gland D35.00    Bilateral  cataracts H26.9    Bilateral hearing loss H91.93    Bilateral impacted cerumen H61.23    Carpal tunnel syndrome G56.00    Cervical high risk HPV (human papillomavirus) test positive R87.810    Diverticulosis of large intestine K57.30    Encounter for health-related screening Z13.9    Leiomyoma of uterus, unspecified D25.9    H/O splenectomy Z90.81    Malignant neoplasm of kidney C64.9    Malignant neoplasm of lung C34.90    Metastatic cancer to cervical lymph nodes C77.0    Neck mass R22.1    Plantar fasciitis M72.2    Pulmonary nodule R91.1    Restless legs syndrome (RLS) G25.81    Malignant neoplasm C80.1    Encounter for care related to Port-a-Cath Z45.2    History of lung cancer Z85.118    Metastatic renal cell carcinoma C64.9    Other nonspecific abnormal finding R68.89    Current every day smoker F17.200    History of lobectomy of lung Z90.2     SURGERIES:  Past Surgical History:   Procedure Laterality Date    ABDOMINAL SURGERY      APPENDECTOMY       SECTION      CHOLECYSTECTOMY      HERNIA REPAIR      LUNG REMOVAL, PARTIAL Right     NEPHRECTOMY Left      HEALTH MAINTENANCE ITEMS:  Health Maintenance Due   Topic Date Due    DXA SCAN  Never done    ZOSTER VACCINE (1 of 2) Never done    MAMMOGRAM  2021    COVID-19 Vaccine (3 - Moderna risk series) 2022    ANNUAL WELLNESS VISIT  2022    INFLUENZA VACCINE  2024       <no information>  Last Completed Colonoscopy            COLORECTAL CANCER SCREENING (COLONOSCOPY - Every 10 Years) Next due on 4/15/2032      04/15/2022  Outside Procedure: COLONOSCOPY                  Immunization History   Administered Date(s) Administered    COVID-19 (MODERNA) 1st,2nd,3rd Dose Monovalent 2021, 2021    FLUAD TRI 65YR+ 2019    Fluzone High-Dose 65+YRS 2019    H1N1 Inj 2009    Influenza, Unspecified 10/09/2012, 10/08/2013, 10/08/2013, 10/08/2014, 10/12/2015, 10/13/2016    Pneumococcal Conjugate 13-Valent (PCV13)  "09/15/2016    Pneumococcal Polysaccharide (PPSV23) 05/16/2018    Tdap 05/16/2018     Last Completed Mammogram       This patient has no relevant Health Maintenance data.              FAMILY HISTORY:  Family History   Problem Relation Age of Onset    No Known Problems Mother     No Known Problems Father     Cancer Sister     No Known Problems Maternal Grandmother     No Known Problems Maternal Grandfather     No Known Problems Paternal Grandmother     No Known Problems Paternal Grandfather     No Known Problems Brother     No Known Problems Maternal Aunt     No Known Problems Maternal Uncle     No Known Problems Paternal Aunt     No Known Problems Paternal Uncle     No Known Problems Other     Asthma Neg Hx     Diabetes Neg Hx     Emphysema Neg Hx     Heart failure Neg Hx     Hypertension Neg Hx      SOCIAL HISTORY:  Social History     Socioeconomic History    Marital status:    Tobacco Use    Smoking status: Every Day     Current packs/day: 0.75     Average packs/day: 0.8 packs/day for 58.8 years (44.1 ttl pk-yrs)     Types: Cigarettes     Start date: 1966    Smokeless tobacco: Never    Tobacco comments:     half a pack now   Substance and Sexual Activity    Alcohol use: Not Currently    Drug use: Never    Sexual activity: Defer       REVIEW OF SYSTEMS:  Review of Systems   Constitutional:  Positive for fatigue (\"tired but I manage.\"). Negative for activity change, appetite change (\"much better\"), chills, diaphoresis and fever.        Manages all her ADLs, including chores, and driving.  Lives alone. 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  Says she is still active and \"am still up and about all the time but get tired sometimes\"   HENT: Negative.     Eyes: Negative.    Respiratory:  Positive for cough (productive of clear/cream/discolored colored/never blood tinged - phlegm) and shortness of breath (Admits to MEHTA, occasional SOB with her routine activities. ). Negative for chest " "tightness and wheezing.         Is still smoking > 1 ppd - smoker since age 15.  \"Sometimes less\"   Cardiovascular: Negative.    Gastrointestinal:  Negative for constipation (Modulated by senna/Miralax).   Genitourinary: Negative.    Musculoskeletal:  Positive for arthralgias (shoulders, knees, back, left hip, hands.  \"Not new\"). Negative for back pain (Left side radicular symptoms have been quiescent), joint swelling, myalgias and neck pain.   Skin: Negative.    Allergic/Immunologic: Positive for environmental allergies (\"pollen\").   Neurological:  Positive for numbness (left hand, \"carpal tunnel\"). Negative for dizziness.   Hematological: Negative.    Psychiatric/Behavioral:  Positive for depressed mood (\"on and off.\"). The patient is nervous/anxious.        /76   Pulse 74   Temp 97.3 °F (36.3 °C) (Temporal)   Resp 18   Ht 152.4 cm (60\")   Wt 57.1 kg (125 lb 14.4 oz)   SpO2 93%   BMI 24.59 kg/m²  Body surface area is 1.53 meters squared.  Pain Score    10/31/24 1028   PainSc: 0-No pain       Physical Exam:  Physical Exam   Constitutional: She is oriented to person, place, and time. No distress.   Pleasant, heavy set, modestly kept elderly female.  Ambulatory.  ECOG 1.      She has lost 10 lb (7 lb at her prior visit) since her last visit.   HENT:   Head: Normocephalic and atraumatic.   Scarring of the left supraclavicular area from prior neck surgery   Eyes: Pupils are equal, round, and reactive to light. Conjunctivae are normal. No scleral icterus.   Neck: No JVD present. No tracheal deviation present. No thyromegaly present.   Cardiovascular: Normal rate and regular rhythm. Exam reveals no friction rub.   Pulmonary/Chest: No stridor. She has wheezes (soft, scattered-unchanged). She has no rales.   Distant breath sounds    Healed thoracotomy incisions   Abdominal: Soft. Bowel sounds are normal. She exhibits no distension and no mass. There is no abdominal tenderness. There is no rebound and no " guarding.   Musculoskeletal: Normal range of motion. Deformity (Osteoarthritic changes of the hands) present.   Lymphadenopathy:     She has no cervical adenopathy.   Neurological: She is alert and oriented to person, place, and time.   Skin: Skin is warm and dry.   Psychiatric: Her behavior is normal. Mood and thought content normal.   Vitals reviewed.      Assessment:  1.   Squamous cell lung carcinoma            Tumor stage: pTNM IA3 (pT1c,pN0, M0)            PET Tumor Chaplin: 1.7 cm nodule in the lingula.  No other PET evidence for metastatic disease.            Complications of tumor: None            Tumor status: Untreated.              -- Radiographically progressing (has grown to 1.7cm on chest CT, 06/01/2020 from 1.5 cm on chest CT, 02/19/2020).            -- 08/25/2020-robotic lingular segmentectomy (Dr. Moshe Hand).  Diagnosis: Squamous cell carcinoma, 2.2 cm in greatest extent; negative pleural invasion; surgical margins negative for tumor; 1 benign lymph node (1/14). pTNM: pT1c, pN0            --01/19/2021-CT chest.  Status post bilateral lung surgery with postop changes.  No CT evidence of malignancy or acute cardiopulmonary process.  Mild induration of the left axilla with small lymph nodes, significance uncertain.  Consider adenitis.  Stable low-attenuation right adrenal mass.  Changes from left nephrectomy.           --07/19/2021-CT chest.  Postsurgical changes.  No CT evidence of developing malignancy.  No acute cardiopulmonary process.           --12/27/2021-CT chest-stable treatment-related changes.  No evidence of new and/or recurrent disease.  Advanced coronary artery atheromatous calcification.           --09/01/2022- CT chest-no new or enlarging pulmonary nodules.  No evidence of new and/or recurrent disease.  Advanced coronary atherosclerosis and postoperative changes to the chest.           --2/22/2023- CT chest-  1. Stable appearance of the chest from previous exam of 09/01/2022 with  no radiographic evidence of recurrence or metastatic disease. 2. Changes of centrilobular emphysema. No evidence of consolidative pneumonia or effusion. No developing mediastinal or axillary lymphadenopathy.3. Stable hypodense right adrenal nodule likely representing an adenoma..          --9/21/23- CT chest-  No evidence of disease recurrence or progression. Similar posttreatment changes of the bilateral chest. Moderate emphysematous changes. Heavily calcified coronary arteries persistent material. Small hiatal hernia. Similar low-density enlargement of the RIGHT adrenal gland as far back as 7/19/2021, favored to represent an adrenal adenoma.          -- 3/21/24- CT chest- Postsurgical/postprocedural changes in the upper lobes bilaterally. A stable CT scan of the chest. No finding to suggest a neoplastic process, tumor recurrence or metastatic disease. The remaining nonacute findings in the chest and proximal abdomen are similar to the previous study.          --10/28/24-CT chest-1. A stable and unchanged unenhanced CT scan of the chest. No finding to suggest a neoplastic process or metastatic disease 2. Stable posttreatment changes of the left upper chest.             2.   History of non-small cell lung cancer in the right upper lobe, status post right upper lobectomy, 2004.  No details  3.   History of metastatic urothelial carcinoma with positive supraclavicular lymph node biopsy and left nephrectomy, 2009.  Details unclear but apparently received chemotherapy and radiation.  4.   Tobacco use, 57+ years cigarette smoking  5.   Chronic kidney disease, stage III.    --GFR 64.6 mL/min, 3/21/24 (prior: 51-61.5 mL/min)    6.   Abnormal CEA.  Tobacco smoker.  Needs follow-up.  --10.4, 3/21/24 (prior: 8.54-9.85)    7.   Anxiety. Chronic  8.   Low-grade neutrophilic leukocytosis  --WBC 10.3, 10/31/24.  Reactive to tobacco smoking associated bronchitis?      Recommendations:  1.   Apprised of labs, 3/21/24 -10/31/24 with  normal CBC, depressed (stable) GFR, otherwise normal CMP, CEA 10.4 (prior: 7.69-9.85).    2.   Review CT chest, 10/28/2024 (above).  DWIGHT.    3.   Note that we have requested pathology at [place] to send out biopsy specimen on 6/10/2020: EGFR, ALK, ROS 1, PDL1, BRAF-7th request- no results.  4.   Review NCCN guidelines version 6.2020 non-small cell lung cancer-stage IA (peripheral T1 abc, N0).  If operable: Surgical exploration and resection plus mediastinal lymph node dissection/sampling.  Adjuvant chemotherapy only if node positive.  If margins negative (R0)-observation.  Surveillance- H&P and chest CT +/- contrast every 6 months for 2 to 3 years then H&P and low-dose noncontrast enhanced chest CT annually..  5.   Schedule CT chest without contrast in 23 weeks at [facility].  Compare to prior studies.   6.   Return to office in 24 weeks with (specify fasting) pre-office CMP, CBC with differential, and CEA.  7.   Importance of Smoking Cessation discussed with patient and informed patient additional information will be on today's [facility]'s Flyer - Plan to Be Tobacco Free handout provided to patient     I spent ~34 minutes caring for Jennie on this date of service. This time includes time spent by me in the following activities: preparing for the visit, reviewing tests, performing a medically appropriate examination and/or evaluation, counseling and educating the patient/family/caregiver, ordering medications, tests, or procedures and documenting information in the medical record

## 2024-10-28 ENCOUNTER — HOSPITAL ENCOUNTER (OUTPATIENT)
Dept: CT IMAGING | Facility: HOSPITAL | Age: 74
Discharge: HOME | End: 2024-10-28
Admitting: INTERNAL MEDICINE
Payer: MEDICARE

## 2024-10-28 DIAGNOSIS — C34.92 MALIGNANT NEOPLASM OF LEFT LUNG, UNSPECIFIED PART OF LUNG: ICD-10-CM

## 2024-10-28 PROCEDURE — 71250 CT THORAX DX C-: CPT

## 2024-10-31 ENCOUNTER — LAB (OUTPATIENT)
Dept: LAB | Facility: HOSPITAL | Age: 74
End: 2024-10-31
Payer: MEDICARE

## 2024-10-31 ENCOUNTER — OFFICE VISIT (OUTPATIENT)
Dept: ONCOLOGY | Facility: CLINIC | Age: 74
End: 2024-10-31
Payer: MEDICARE

## 2024-10-31 VITALS
HEART RATE: 74 BPM | BODY MASS INDEX: 24.72 KG/M2 | TEMPERATURE: 97.3 F | RESPIRATION RATE: 18 BRPM | WEIGHT: 125.9 LBS | HEIGHT: 60 IN | OXYGEN SATURATION: 93 % | DIASTOLIC BLOOD PRESSURE: 76 MMHG | SYSTOLIC BLOOD PRESSURE: 118 MMHG

## 2024-10-31 DIAGNOSIS — C34.92 MALIGNANT NEOPLASM OF LEFT LUNG, UNSPECIFIED PART OF LUNG: ICD-10-CM

## 2024-10-31 DIAGNOSIS — C34.91 MALIGNANT NEOPLASM OF RIGHT LUNG, UNSPECIFIED PART OF LUNG: Primary | ICD-10-CM

## 2024-10-31 LAB
ALBUMIN SERPL-MCNC: 4.1 G/DL (ref 3.5–5.2)
ALBUMIN/GLOB SERPL: 1.4 G/DL
ALP SERPL-CCNC: 99 U/L (ref 39–117)
ALT SERPL W P-5'-P-CCNC: 14 U/L (ref 1–33)
ANION GAP SERPL CALCULATED.3IONS-SCNC: 13 MMOL/L (ref 5–15)
AST SERPL-CCNC: 17 U/L (ref 1–32)
BASOPHILS # BLD AUTO: 0.07 10*3/MM3 (ref 0–0.2)
BASOPHILS NFR BLD AUTO: 0.7 % (ref 0–1.5)
BILIRUB SERPL-MCNC: 0.5 MG/DL (ref 0–1.2)
BUN SERPL-MCNC: 19 MG/DL (ref 8–23)
BUN/CREAT SERPL: 23.2 (ref 7–25)
CALCIUM SPEC-SCNC: 9.1 MG/DL (ref 8.6–10.5)
CEA SERPL-MCNC: 8.98 NG/ML
CHLORIDE SERPL-SCNC: 102 MMOL/L (ref 98–107)
CO2 SERPL-SCNC: 24 MMOL/L (ref 22–29)
CREAT SERPL-MCNC: 0.82 MG/DL (ref 0.57–1)
DEPRECATED RDW RBC AUTO: 48.4 FL (ref 37–54)
EGFRCR SERPLBLD CKD-EPI 2021: 75.2 ML/MIN/1.73
EOSINOPHIL # BLD AUTO: 0.31 10*3/MM3 (ref 0–0.4)
EOSINOPHIL NFR BLD AUTO: 3 % (ref 0.3–6.2)
ERYTHROCYTE [DISTWIDTH] IN BLOOD BY AUTOMATED COUNT: 13.8 % (ref 12.3–15.4)
GLOBULIN UR ELPH-MCNC: 2.9 GM/DL
GLUCOSE SERPL-MCNC: 100 MG/DL (ref 65–99)
HCT VFR BLD AUTO: 42.5 % (ref 34–46.6)
HGB BLD-MCNC: 14 G/DL (ref 12–15.9)
IMM GRANULOCYTES # BLD AUTO: 0.03 10*3/MM3 (ref 0–0.05)
IMM GRANULOCYTES NFR BLD AUTO: 0.3 % (ref 0–0.5)
LYMPHOCYTES # BLD AUTO: 2.52 10*3/MM3 (ref 0.7–3.1)
LYMPHOCYTES NFR BLD AUTO: 24.4 % (ref 19.6–45.3)
MCH RBC QN AUTO: 31.5 PG (ref 26.6–33)
MCHC RBC AUTO-ENTMCNC: 32.9 G/DL (ref 31.5–35.7)
MCV RBC AUTO: 95.5 FL (ref 79–97)
MONOCYTES # BLD AUTO: 0.73 10*3/MM3 (ref 0.1–0.9)
MONOCYTES NFR BLD AUTO: 7.1 % (ref 5–12)
NEUTROPHILS NFR BLD AUTO: 6.67 10*3/MM3 (ref 1.7–7)
NEUTROPHILS NFR BLD AUTO: 64.5 % (ref 42.7–76)
NRBC BLD AUTO-RTO: 0 /100 WBC (ref 0–0.2)
PLATELET # BLD AUTO: 405 10*3/MM3 (ref 140–450)
PMV BLD AUTO: 9.6 FL (ref 6–12)
POTASSIUM SERPL-SCNC: 4.1 MMOL/L (ref 3.5–5.2)
PROT SERPL-MCNC: 7 G/DL (ref 6–8.5)
RBC # BLD AUTO: 4.45 10*6/MM3 (ref 3.77–5.28)
SODIUM SERPL-SCNC: 139 MMOL/L (ref 136–145)
WBC NRBC COR # BLD AUTO: 10.33 10*3/MM3 (ref 3.4–10.8)

## 2024-10-31 PROCEDURE — 80053 COMPREHEN METABOLIC PANEL: CPT

## 2024-10-31 PROCEDURE — 85025 COMPLETE CBC W/AUTO DIFF WBC: CPT

## 2024-10-31 PROCEDURE — 36415 COLL VENOUS BLD VENIPUNCTURE: CPT

## 2024-10-31 PROCEDURE — 82378 CARCINOEMBRYONIC ANTIGEN: CPT

## 2024-10-31 RX ORDER — VARENICLINE TARTRATE 1 MG/1
1 TABLET, FILM COATED ORAL 2 TIMES DAILY
COMMUNITY

## 2024-11-14 ENCOUNTER — TRANSCRIBE ORDERS (OUTPATIENT)
Dept: ADMINISTRATIVE | Facility: HOSPITAL | Age: 74
End: 2024-11-14
Payer: MEDICARE

## 2024-11-14 DIAGNOSIS — Z12.31 ENCOUNTER FOR SCREENING MAMMOGRAM FOR MALIGNANT NEOPLASM OF BREAST: Primary | ICD-10-CM

## 2024-11-24 LAB
NCCN CRITERIA FLAG: NORMAL
TYRER CUZICK SCORE: 1.8

## 2024-11-29 ENCOUNTER — HOSPITAL ENCOUNTER (OUTPATIENT)
Dept: MAMMOGRAPHY | Facility: HOSPITAL | Age: 74
Discharge: HOME | End: 2024-11-29
Admitting: FAMILY MEDICINE
Payer: MEDICARE

## 2024-11-29 DIAGNOSIS — Z12.31 ENCOUNTER FOR SCREENING MAMMOGRAM FOR MALIGNANT NEOPLASM OF BREAST: ICD-10-CM

## 2024-11-29 PROCEDURE — 77067 SCR MAMMO BI INCL CAD: CPT

## 2024-11-29 PROCEDURE — 77063 BREAST TOMOSYNTHESIS BI: CPT

## 2025-04-28 ENCOUNTER — TELEPHONE (OUTPATIENT)
Dept: ONCOLOGY | Facility: CLINIC | Age: 75
End: 2025-04-28
Payer: MEDICARE

## 2025-04-28 DIAGNOSIS — C34.91 MALIGNANT NEOPLASM OF RIGHT LUNG, UNSPECIFIED PART OF LUNG: Primary | ICD-10-CM

## 2025-04-28 NOTE — TELEPHONE ENCOUNTER
----- Message from Adelfo Grider sent at 4/28/2025  8:55 AM CDT -----  Lets get her CT and labs then reschedule to the week after  ----- Message -----  From: Sylvia Mora RegSched Rep  Sent: 4/28/2025   8:43 AM CDT  To: Adelfo Grider MD; Melody Conley MA    Ct was cancelled in January, and pt scheduled labs same day as appt  ----- Message -----  From: Adelfo Grider MD  Sent: 4/25/2025   8:17 AM CDT  To: Matthew Lainez; Melody Weber#    We need these before we see next week pls:- Schedule CT chest without contrast in 23 weeks at [facility].  Compare to prior studies. 6.   Return to office in 24 weeks with (specify fasting) pre-office CMP, CBC with differential, and CEA.

## 2025-05-07 ENCOUNTER — HOSPITAL ENCOUNTER (OUTPATIENT)
Dept: CT IMAGING | Facility: HOSPITAL | Age: 75
Discharge: HOME | End: 2025-05-07
Admitting: INTERNAL MEDICINE
Payer: MEDICARE

## 2025-05-07 DIAGNOSIS — C34.91 MALIGNANT NEOPLASM OF RIGHT LUNG, UNSPECIFIED PART OF LUNG: ICD-10-CM

## 2025-05-07 PROCEDURE — 71250 CT THORAX DX C-: CPT

## 2025-05-07 NOTE — PROGRESS NOTES
[facility] HEMATOLOGY AND ONCOLOGY  [address]  [place] [address] [phone]    Patient Name: Jennie Molina  Encounter Date: 5/12/2025  YOB: 1950  Patient Number: [id number]      REASON FOR VISIT: Jennie Molina is a 74-year-old female who returns in follow-up of 2 lung cancers: Squamous cell carcinoma in the right upper lobe, status post partial resection, 2004. On 08/25/2020 (56.5 months) -underwent robotic lingular segmentectomy for squamous cell carcinoma, 2.2 cm in greatest extent; negative pleural invasion; surgical margins negative for tumor; 1 benign lymph node (1/14). pTNM: pT1c, pN0.  She is here alone.    I have reviewed the HPI and verified with the patient the accuracy of it. No changes to interval history since the information was documented. Adelfo Grider MD 05/12/25      DIAGNOSTIC ABNORMALITIES/PREVIOUS INTERVENTIONS:         1.   History of active tobacco use (55+ years smoking history), urothelial carcinoma with positive supraclavicular lymph node biopsy, and left nephrectomy, 2009.  Had received chemotherapy and radiation.          -    2004 - History of right lung cancer:   Station 7 node, biopsy:    Negative for carcinoma.  Station 4, biopsy:    Negative for carcinoma.  Lung, right upper lobe, mass, excisional biopsy:   Poorly differentiated non-small cell carcinoma (see microscopic description).  Lymph node, station 7, resection:  Thirteen lymph nodes, negative for tumor (0/13).  Lymph node, station 4R, resection:  Eighteen lymph nodes, negative for tumor (0/18).  Lymph node, station 11R, resection:  Four lymph nodes, negative for tumor (0/4).  Rib, 5th, resection:    Negative for carcinoma.  Lymph node, station 10R, resection:  One lymph node, negative for tumor (0/1).  Right upper lobe mass, excisional biopsy:  Poorly differentiated non small cell carcinoma; see Tumor Characteristics and  comment.  Tumor extends to the inked pleural surface.  Right upper lobe, lobectomy:  Focal hemorrhage and collapse.  Negative for carcinoma.                     -    2009 - History of urothelial carcinoma:   10/21/2009:  Left kidney, radial nephrectomy:  High grade urothelial carcinoma with extensive necrosis.  Tumor arises in renal pelvis (in situ carcinoma) and invades through kidney to perinephric fat.  Lymphovascular invasion: Not identified.  Margins: Negative.  One lymph node, negative for malignancy (0/1).  Left hilar lymph node:  One lymph node, negative for malignancy (0/1).  Spleen:  Spleen without significant pathologic change           -    11/13/2009:  Left supraclavicular lymph node:  Metastatic high grade carcinoma involving one of four lymph nodes (1/4), see comment.           2.   Surveillance CTs in October 2019 was found to have a new 5 mm nodule in the left lingula.         3.   02/19/2020-CT chest.  Impression: Largest 1.5 cm nodule at the lingula and new 5 mm pulmonary nodule (reported on outside scan not available).  Changes of right upper lobe resection.  Emphysema.  Right hydronephrosis versus extrarenal pelvis (outside imaging report 9/18/2019 indicates extrarenal pelvis but images not available for comparison).  Right adrenal nodule, incompletely characterized.  Prior splenectomy.  Possible left adrenalectomy and nephrectomy.         4.   03/03/2020-PET scan.  Hypermetabolic 1.5 cm pulmonary nodule in the lingula, highly suspicious for metastatic disease or primary lung neoplasm.  No other evidence of metastatic disease or abnormal FDG uptake.  2 cm left adrenal gland nodule, most compatible with adenoma.  6 mm pulmonary nodule at the right lung base in a background of scarring.  Non-FDG avid but too small to accurately characterize by PET scan.         5.   03/27/2020- seen for medical oncology by Dr. Ventura who referred her to Dr. Enriquez of pulmonary in assessment of tissue biopsy.       "   6.   05/27/2020- seen by Dr. Enriquez of pulmonary in assessment of the left upper lobe nodule.  Review of records from [facility] and [facility] indicate prior history of lung cancer on the right side resected and more recently urothelial cancer of the  system.  All have been treated.  More recent surveillance CT identified an asymptomatic lesion in the left upper lobe adjacent to the fissure.  PET scan showed hypermetabolism in this lesion.  Patient was referred to [facility] for possible navigational bronchoscopy/biopsy of the lesion that appears to be in the lingula.  Patient says she has received \"the maximum\" radiation for her lifetime.         7.   06/01/2020-CT chest.  Comparison 02/19/2020, 03/20/2020.  Impression: Slight increase in size of lingular nodule measuring 1.7 cm (previously 1.5 cm) concerning for malignancy.  Decreased size of right lower lobe pulmonary nodule now measures 0.3 cm (from 0.6 cm).  Stable postoperative changes in the right hilum.  Left nephrectomy.  Stable right adrenal low-density lesion, favored to represent an adenoma.         8.   06/10/2020- [facility] (Dr. Hare) bronchoscopy with biopsy.  Pathology: LUNG, LINGULA, TRANSBRONCHIAL BIOPSY:   AT LEAST SQUAMOUS CELL CARCINOMA IN SITU.  Immunohistochemistry demonstrates the neoplastic cells to be positive for p40 and negative for GATA3 and CK7, consistent with squamous differentiation.  LUNG NODULE, LINGULA, TRANSBRONCHIAL NEEDLE ASPIRATION WITH CELL BLOCK:   INVOLVED BY CARINOMA, FAVOR SQUAMOUS CELL CARCINOMA (SEE COMMENT).  Comment: Immunohistochemical stains performed with appropriate controls show that the tumor cells are positive for p40 and negative for ELBA-3 and TTF-1. While this immunophenotype and cytomorphology can be seen in both a primary squamous cell carcinoma of the lung and metastatic urothelial carcinoma, the lack of reactivity for ELBA-3 and " the presence of carcinoma in situ in the corresponding surgical biopsy [id number] would favor a primary lung carcinoma.            9.   06/10/2020-hemoglobin 12.6, hematocrit 37, MCV 95, platelets 346,000, WBC 10.3.  BMP with calcium of 8.1 otherwise normal with creatinine 0.99.      10.   07/20/2020- MRI brain w and wo contrast: No metastatic disease.  Mild chronic microvascular changes.      11. 08/25/2020- robotic lingular segmentectomy (Dr. Moshe Hand).  Diagnosis: A.lung, lingular segmentectomy: Squamous cell carcinoma, 2.2 cm in greatest extent; negative for pleural invasion; surgical margins negative for tumor; 1 benign lymph node (see synoptic).  B.lymph node, level 9L excision: 1 benign lymph node.  C.lymph node, level 8L, excision: 2 benign lymph nodes.  D.lymph node, 7L, excision: 1 benign lymph node.  E.lymph node, level 5, excision: 3 benign lymph nodes.  F.lymph node, 12 L, excision: 1 benign lymph node.  G.lymph node, level 11 L, excision: 4 benign lymph nodes.  H.lymph node, 13 L, excision: At least one benign lymph node.  I.bronchial staple line: Unremarkable bronchus; negative for tumor.  J.lung, final parenchymal margin: Unremarkable lung parenchyma.  Synoptic: Lung specimen-procedure: Segmentectomy.  Specimen laterality: Left.  Spread through airspaces (STS): Not identified.  Total tumor size (size of entire tumor): Greatest dimension 2.2 cm.  Size of invasive component: 2.2 cm.  Tumor focality: Single focus.  Visceropleural invasion: Not identified.  Direct invasion of adjacent structures: No adjacent structures present.  Treatment effect: No known presurgical therapy.  Lymphovascular invasion: Not identified.  Margins: Uninvolved by tumor.  Lymph nodes: Number of lymph nodes involved: 0.  Number of lymph nodes examined: 14.  Lev stations examined: 7: Subcarinal, 5: Sub-aortic/aortopulmonary/AP window, 8L: Paraesophageal, 9L, pulmonary ligament, 11 L, interlobar, 12 L, lobar 13 L  segmental.  Pathologic stage classification (P TNM, AJCC eighth edition).  Primary tumor (pT): pT1c, regional lymph nodes (pN): pN0.  Likely insufficient material for molecular testing.    LABS    Lab Results - Last 18 Months   Lab Units 05/12/25  0819 10/31/24  1022 05/02/24  1356 03/21/24  0932   HEMOGLOBIN g/dL 12.8 14.0 13.5 13.5   HEMATOCRIT % 38.8 42.5 39.6 40.1   MCV fL 95.3 95.5 94.3 95.0   WBC 10*3/mm3 9.58 10.33 9.80 10.62   RDW % 14.0 13.8 14.1 14.0   MPV fL 10.3 9.6 9.7 9.8   PLATELETS 10*3/mm3 336 405 379 401   IMM GRAN % %  --  0.3 0.3 0.4   NEUTROS ABS 10*3/mm3 5.33 6.67 6.22 6.61   LYMPHS ABS 10*3/mm3  --  2.52 2.77 2.78   MONOS ABS 10*3/mm3  --  0.73 0.64 0.81   EOS ABS 10*3/mm3 0.38 0.31 0.10 0.32   BASOS ABS 10*3/mm3 0.10 0.07 0.04 0.06   IMMATURE GRANS (ABS) 10*3/mm3  --  0.03 0.03 0.04   NRBC /100 WBC  --  0.0 0.0 0.0   NEUTROPHIL % % 55.6  --   --   --    MONOCYTES % % 7.1  --   --   --    BASOPHIL % % 1.0  --   --   --    ATYP LYMPH % % 2.0  --   --   --        Lab Results - Last 18 Months   Lab Units 05/12/25  0819 10/31/24  1022 05/02/24  1356 03/21/24  0932   GLUCOSE mg/dL 88 100* 132* 92   SODIUM mmol/L 141 139 137 143   POTASSIUM mmol/L 4.1 4.1 4.2 4.1   CO2 mmol/L 26.0 24.0 27.0 28.0   CHLORIDE mmol/L 106 102 101 105   ANION GAP mmol/L 9.0 13.0 9.0 10.0   CREATININE mg/dL 0.91 0.82 1.01* 0.93   BUN mg/dL 16 19 15 17   BUN / CREAT RATIO  17.6 23.2 14.9 18.3   CALCIUM mg/dL 8.6 9.1 9.3 9.5   ALK PHOS U/L 80 99 76 82   TOTAL PROTEIN g/dL 6.5 7.0 7.1 6.9   ALT (SGPT) U/L 13 14 16 14   AST (SGOT) U/L 17 17 19 20   BILIRUBIN mg/dL 0.2 0.5 0.2 0.4   ALBUMIN g/dL 4.0 4.1 4.3 4.2   GLOBULIN gm/dL 2.5 2.9 2.8 2.7         PAST MEDICAL HISTORY:  ALLERGIES:  Allergies   Allergen Reactions    Iodides Hives and Itching     HIVES AND ITCHING POST CT STUDY ON 9/4/15     2-24-16 WITH PREMEDICATION PT STILL HAD A REACTION WITH HIVES AND THROAT TIGHTENING   HIVES AND ITCHING POST CT STUDY ON 9/4/15      2-24-16 WITH PREMEDICATION PT STILL HAD A REACTION WITH HIVES AND THROAT TIGHTENING       Iodinated Contrast Media Hives     CURRENT MEDICATIONS:  Outpatient Encounter Medications as of 2025   Medication Sig Dispense Refill    ALPRAZolam (XANAX) 0.5 MG tablet Take 1 tablet by mouth At Night As Needed.      Misc Natural Products (NEURIVA PO) Take  by mouth.      multivitamin with minerals tablet tablet Take 1 tablet by mouth Daily.      naproxen sodium (ALEVE) 220 MG tablet Take 1 tablet by mouth 2 (Two) Times a Day As Needed.      traZODone (DESYREL) 50 MG tablet Take 1 tablet by mouth Daily As Needed.      varenicline (CHANTIX) 1 MG tablet Take 1 tablet by mouth 2 (Two) Times a Day.      DANIE SYRUP PO Take  by mouth. Gummy (Patient not taking: Reported on 2025)      [DISCONTINUED] Biotin 5 MG capsule Take  by mouth. (Patient not taking: Reported on 2025)      [DISCONTINUED] Ginkgo Biloba Extract 60 MG capsule Take  by mouth. (Patient not taking: Reported on 2025)      [DISCONTINUED] SUPER B COMPLEX/C PO Take  by mouth Daily. (Patient not taking: Reported on 2025)       No facility-administered encounter medications on file as of 2025.       Adult illnesses:  Left urothelial carcinoma, metastatic with positive supraclavicular lymph node biopsy, 2009.  Lung cancer  Hives after CT contrast  Active tobacco use (55+ years)    Past surgeries:  Port-A-Cath placement, 3/27/2020   section  Left nephrectomy/adrenalectomy  Right upper lobe lung resection, 2004  Appendectomy  Cholecystectomy  Splenectomy  Hernia repair  Cataracts removed with lenses  Blepharoplasty, [facility]U  06/10/2020- transbronchial needle aspiration of lingular lung nodule ([facility]).  Carcinoma, favor squamous cell carcinoma.    ADULT ILLNESSES:  Patient Active Problem List   Diagnosis Code    Abnormal Pap smear, low grade squamous intraepithelial lesion (LGSIL) R87.612    Acute bilateral back pain M54.9     Adenopathy, cervical R59.0    Allergic rhinitis J30.9    Benign neoplasm of adrenal gland D35.00    Bilateral cataracts H26.9    Bilateral hearing loss H91.93    Bilateral impacted cerumen H61.23    Carpal tunnel syndrome G56.00    Cervical high risk HPV (human papillomavirus) test positive R87.810    Diverticulosis of large intestine K57.30    Encounter for health-related screening Z13.9    Leiomyoma of uterus, unspecified D25.9    H/O splenectomy Z90.81    Malignant neoplasm of kidney C64.9    Malignant neoplasm of lung C34.90    Metastatic cancer to cervical lymph nodes C77.0    Neck mass R22.1    Plantar fasciitis M72.2    Pulmonary nodule R91.1    Restless legs syndrome (RLS) G25.81    Malignant neoplasm C80.1    Encounter for care related to Port-a-Cath Z45.2    History of lung cancer Z85.118    Metastatic renal cell carcinoma C64.9    Other nonspecific abnormal finding R68.89    Current every day smoker F17.200    History of lobectomy of lung Z90.2     SURGERIES:  Past Surgical History:   Procedure Laterality Date    ABDOMINAL SURGERY      APPENDECTOMY       SECTION      CHOLECYSTECTOMY      HERNIA REPAIR      LUNG REMOVAL, PARTIAL Right     NEPHRECTOMY Left      HEALTH MAINTENANCE ITEMS:  Health Maintenance Due   Topic Date Due    DXA SCAN  Never done    ZOSTER VACCINE (1 of 2) Never done    COVID-19 Vaccine (3 - Moderna risk series) 2022    ANNUAL WELLNESS VISIT  2022    RSV Vaccine - Adults (1 - 1-dose 75+ series) Never done       <no information>  Last Completed Colonoscopy            Needs Review       COLORECTAL CANCER SCREENING (COLONOSCOPY - Every 10 Years) Tentatively due on 4/15/2032      04/15/2022  Outside Procedure: COLONOSCOPY                          Immunization History   Administered Date(s) Administered    COVID-19 (MODERNA) 1st,2nd,3rd Dose Monovalent 2021, 2021    COVID-19 (MODERNA) Monovalent Original Booster 2022    FLUAD TRI 65YR+ 2019     Fluzone High-Dose 65+YRS 09/14/2019    H1N1 Inj 11/20/2009    Influenza, Unspecified 10/09/2012, 10/08/2013, 10/08/2013, 10/08/2014, 10/12/2015, 10/13/2016    Pneumococcal Conjugate 13-Valent (PCV13) 09/15/2016    Pneumococcal Polysaccharide (PPSV23) 05/16/2018    Tdap 05/16/2018     Last Completed Mammogram            Completed or No Longer Recommended       MAMMOGRAM  Discontinued        Frequency changed to Never automatically (Topic No Longer Applies)    11/29/2024  Mammo Screening Digital Tomosynthesis Bilateral With CAD    09/16/2019  Outside Procedure: CHG SCREENING DIGITAL BREAST TOMOSYNTHESIS BI    09/16/2019  Outside Claim: HC MAMMOGRAM SCREENING BILAT DIGITAL W CAD    09/07/2018  Outside Procedure: CHG SCREENING DIGITAL BREAST TOMOSYNTHESIS BI     Only the first 5 history entries have been loaded, but more history exists.                            FAMILY HISTORY:  Family History   Problem Relation Age of Onset    No Known Problems Mother     No Known Problems Father     Cancer Sister     No Known Problems Brother     No Known Problems Maternal Grandmother     No Known Problems Paternal Grandmother     No Known Problems Maternal Aunt     No Known Problems Paternal Aunt     No Known Problems Maternal Grandfather     No Known Problems Paternal Grandfather     No Known Problems Maternal Uncle     No Known Problems Paternal Uncle     No Known Problems Other     Asthma Neg Hx     Diabetes Neg Hx     Emphysema Neg Hx     Heart failure Neg Hx     Hypertension Neg Hx     Breast cancer Neg Hx      SOCIAL HISTORY:  Social History     Socioeconomic History    Marital status:    Tobacco Use    Smoking status: Every Day     Current packs/day: 0.75     Average packs/day: 0.8 packs/day for 59.4 years (44.5 ttl pk-yrs)     Types: Cigarettes     Start date: 1966    Smokeless tobacco: Never    Tobacco comments:     half a pack now   Substance and Sexual Activity    Alcohol use: Not Currently    Drug use: Never     "Sexual activity: Defer       REVIEW OF SYSTEMS:  Review of Systems   Constitutional:  Positive for fatigue (\"tired but I manage.\"). Negative for activity change, appetite change (\"much better\"), chills, diaphoresis and fever.        Manages all her ADLs, including chores, and driving.  Lives alone. 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  Says she is still active and \"am still up and about all the time\"  States she is moving to [place] at the end of the month to be closer to her daughter.     HENT: Negative.     Eyes: Negative.    Respiratory:  Positive for cough (productive of clear/cream/discolored colored/never blood tinged - phlegm) and shortness of breath (Admits to MEHTA, occasional SOB with her routine activities. ). Negative for chest tightness and wheezing.         Is still smoking ~ 1 ppd - smoker since age 15.  \"usually less\"   Cardiovascular: Negative.    Gastrointestinal:  Negative for constipation (Modulated by senna/Miralax).   Genitourinary: Negative.    Musculoskeletal:  Positive for arthralgias (shoulders, knees, back, left hip, hands.  \"Not new\"). Negative for back pain (Left side radicular symptoms have been quiescent), joint swelling, myalgias and neck pain.   Skin: Negative.    Allergic/Immunologic: Positive for environmental allergies (\"pollen\").   Neurological:  Positive for numbness (left hand, \"carpal tunnel\"). Negative for dizziness.   Hematological: Negative.    Psychiatric/Behavioral:  Positive for depressed mood (\"on and off.\"). The patient is nervous/anxious.      /80   Pulse 77   Temp 97.8 °F (36.6 °C) (Temporal)   Resp 18   Ht 152.4 cm (60\")   Wt 66 kg (145 lb 6.4 oz)   SpO2 97%   BMI 28.40 kg/m²  Body surface area is 1.63 meters squared.  Pain Score    05/12/25 1109   PainSc: 0-No pain         Physical Exam:  Physical Exam   Constitutional: She is oriented to person, place, and time. No distress.   Pleasant, heavy set, modestly kept elderly female. "  Ambulatory.  ECOG 1.      She has regained 20 lb (previously lost 17 lb at her 2 prior visits) since her last visit.   HENT:   Head: Normocephalic and atraumatic.   Scarring of the left supraclavicular area from prior neck surgery   Eyes: Pupils are equal, round, and reactive to light. Conjunctivae are normal. No scleral icterus.   Neck: No JVD present. No tracheal deviation present. No thyromegaly present.   Cardiovascular: Normal rate and regular rhythm. Exam reveals no friction rub.   Pulmonary/Chest: No stridor. She has wheezes (soft, scattered-unchanged). She has no rales.   Distant breath sounds    Healed thoracotomy incisions   Abdominal: Soft. Bowel sounds are normal. She exhibits no distension and no mass. There is no abdominal tenderness. There is no rebound and no guarding.   Musculoskeletal: Normal range of motion. Deformity (Osteoarthritic changes of the hands) present.   Lymphadenopathy:     She has no cervical adenopathy.   Neurological: She is alert and oriented to person, place, and time.   Skin: Skin is warm and dry.   Psychiatric: Her behavior is normal. Mood and thought content normal.   Vitals reviewed.      Assessment:  1.   Squamous cell lung carcinoma            Tumor stage: pTNM IA3 (pT1c,pN0, M0)            PET Tumor [place]: 1.7 cm nodule in the lingula.  No other PET evidence for metastatic disease.            Complications of tumor: None            Tumor status: Untreated.              -- Radiographically progressing (has grown to 1.7cm on chest CT, 06/01/2020 from 1.5 cm on chest CT, 02/19/2020).            -- 08/25/2020-robotic lingular segmentectomy (Dr. Moshe Hand).  Diagnosis: Squamous cell carcinoma, 2.2 cm in greatest extent; negative pleural invasion; surgical margins negative for tumor; 1 benign lymph node (1/14). pTNM: pT1c, pN0            --01/19/2021-CT chest.  Status post bilateral lung surgery with postop changes.  No CT evidence of malignancy or acute cardiopulmonary  process.  Mild induration of the left axilla with small lymph nodes, significance uncertain.  Consider adenitis.  Stable low-attenuation right adrenal mass.  Changes from left nephrectomy.           --07/19/2021-CT chest.  Postsurgical changes.  No CT evidence of developing malignancy.  No acute cardiopulmonary process.           --12/27/2021-CT chest-stable treatment-related changes.  No evidence of new and/or recurrent disease.  Advanced coronary artery atheromatous calcification.           --09/01/2022- CT chest-no new or enlarging pulmonary nodules.  No evidence of new and/or recurrent disease.  Advanced coronary atherosclerosis and postoperative changes to the chest.           --2/22/2023- CT chest-  1. Stable appearance of the chest from previous exam of 09/01/2022 with no radiographic evidence of recurrence or metastatic disease. 2. Changes of centrilobular emphysema. No evidence of consolidative pneumonia or effusion. No developing mediastinal or axillary lymphadenopathy.3. Stable hypodense right adrenal nodule likely representing an adenoma..          --9/21/23- CT chest-  No evidence of disease recurrence or progression. Similar posttreatment changes of the bilateral chest. Moderate emphysematous changes. Heavily calcified coronary arteries persistent material. Small hiatal hernia. Similar low-density enlargement of the RIGHT adrenal gland as far back as 7/19/2021, favored to represent an adrenal adenoma.          -- 3/21/24- CT chest- Postsurgical/postprocedural changes in the upper lobes bilaterally. A stable CT scan of the chest. No finding to suggest a neoplastic process, tumor recurrence or metastatic disease. The remaining nonacute findings in the chest and proximal abdomen are similar to the previous study.          --10/28/24-CT chest-1. A stable and unchanged unenhanced CT scan of the chest. No finding to suggest a neoplastic process or metastatic disease 2. Stable posttreatment changes of the  left upper chest.         --5/7/25- CT chest- No evidence of disease recurrence or progression.              2.   History of non-small cell lung cancer in the right upper lobe, status post right upper lobectomy, 2004.  No details  3.   History of metastatic urothelial carcinoma with positive supraclavicular lymph node biopsy and left nephrectomy, 2009.  Details unclear but apparently received chemotherapy and radiation.  4.   Tobacco use, age 15 to present.  Currently 1 pack/day  5.   Chronic kidney disease, stage III.    --GFR 65.9 mL/min, 5/12/25 (prior: 51-61.5 mL/min)    6.   Abnormal CEA.  Tobacco smoker.  Needs follow-up.  --10.4, 3/21/24 (prior: 8.54-9.85)    7.   Anxiety. Chronic  8.   Low-grade neutrophilic leukocytosis  -- Normal today.  WBC 9.58, 5/12/2025.  Reactive to tobacco smoking associated bronchitis?      Recommendations:  1.   Apprised of labs, 5/12/2025 with normal CBC, normal CMP, CEA p (prior: 7.69-10.4).    2.   Review CT chest, 5/7/25 (above).  DWIGHT    3.   Note that we have requested pathology at [facility] to send out biopsy specimen on 6/10/2020: EGFR, ALK, ROS 1, PDL1, BRAF-7th request- no results.  4.   Review NCCN guidelines version 6.2020 non-small cell lung cancer-stage IA (peripheral T1 abc, N0).  If operable: Surgical exploration and resection plus mediastinal lymph node dissection/sampling.  Adjuvant chemotherapy only if node positive.  If margins negative (R0)-observation.  Surveillance- H&P and chest CT +/- contrast every 6 months for 2 to 3 years then H&P and low-dose noncontrast enhanced chest CT annually..  5.   Schedule CT chest without contrast in [facility] weeks at [facility].  Compare to prior studies.   6.   Return to office in 24 weeks with (specify fasting) pre-office CMP, CBC with differential, and CEA-if she has not moved to [place] by then.  7.   Importance of Smoking Cessation discussed with patient and informed patient additional information will be on today's AVS. [place]  [facility]'s Flyer - Plan to Be Tobacco Free handout provided to patient     I spent ~34 minutes caring for Jennie on this date of service. This time includes time spent by me in the following activities: preparing for the visit, reviewing tests, performing a medically appropriate examination and/or evaluation, counseling and educating the patient/family/caregiver, ordering medications, tests, or procedures and documenting information in the medical record

## 2025-05-12 ENCOUNTER — OFFICE VISIT (OUTPATIENT)
Dept: ONCOLOGY | Facility: CLINIC | Age: 75
End: 2025-05-12
Payer: MEDICARE

## 2025-05-12 ENCOUNTER — LAB (OUTPATIENT)
Dept: LAB | Facility: HOSPITAL | Age: 75
End: 2025-05-12
Payer: MEDICARE

## 2025-05-12 VITALS
BODY MASS INDEX: 28.54 KG/M2 | RESPIRATION RATE: 18 BRPM | HEART RATE: 77 BPM | OXYGEN SATURATION: 97 % | WEIGHT: 145.4 LBS | HEIGHT: 60 IN | SYSTOLIC BLOOD PRESSURE: 122 MMHG | DIASTOLIC BLOOD PRESSURE: 80 MMHG | TEMPERATURE: 97.8 F

## 2025-05-12 DIAGNOSIS — C34.91 MALIGNANT NEOPLASM OF RIGHT LUNG, UNSPECIFIED PART OF LUNG: Primary | ICD-10-CM

## 2025-05-12 DIAGNOSIS — C34.91 MALIGNANT NEOPLASM OF RIGHT LUNG, UNSPECIFIED PART OF LUNG: ICD-10-CM

## 2025-05-12 LAB
ALBUMIN SERPL-MCNC: 4 G/DL (ref 3.5–5.2)
ALBUMIN/GLOB SERPL: 1.6 G/DL
ALP SERPL-CCNC: 80 U/L (ref 39–117)
ALT SERPL W P-5'-P-CCNC: 13 U/L (ref 1–33)
ANION GAP SERPL CALCULATED.3IONS-SCNC: 9 MMOL/L (ref 5–15)
AST SERPL-CCNC: 17 U/L (ref 1–32)
BASOPHILS # BLD MANUAL: 0.1 10*3/MM3 (ref 0–0.2)
BASOPHILS NFR BLD MANUAL: 1 % (ref 0–1.5)
BILIRUB SERPL-MCNC: 0.2 MG/DL (ref 0–1.2)
BUN SERPL-MCNC: 16 MG/DL (ref 8–23)
BUN/CREAT SERPL: 17.6 (ref 7–25)
CALCIUM SPEC-SCNC: 8.6 MG/DL (ref 8.6–10.5)
CEA SERPL-MCNC: 8.3 NG/ML
CHLORIDE SERPL-SCNC: 106 MMOL/L (ref 98–107)
CLUMPED PLATELETS: PRESENT
CO2 SERPL-SCNC: 26 MMOL/L (ref 22–29)
CREAT SERPL-MCNC: 0.91 MG/DL (ref 0.57–1)
DEPRECATED RDW RBC AUTO: 49.2 FL (ref 37–54)
EGFRCR SERPLBLD CKD-EPI 2021: 65.9 ML/MIN/1.73
EOSINOPHIL # BLD MANUAL: 0.38 10*3/MM3 (ref 0–0.4)
EOSINOPHIL NFR BLD MANUAL: 4 % (ref 0.3–6.2)
ERYTHROCYTE [DISTWIDTH] IN BLOOD BY AUTOMATED COUNT: 14 % (ref 12.3–15.4)
GLOBULIN UR ELPH-MCNC: 2.5 GM/DL
GLUCOSE SERPL-MCNC: 88 MG/DL (ref 65–99)
HCT VFR BLD AUTO: 38.8 % (ref 34–46.6)
HGB BLD-MCNC: 12.8 G/DL (ref 12–15.9)
LYMPHOCYTES # BLD MANUAL: 3.09 10*3/MM3 (ref 0.7–3.1)
LYMPHOCYTES NFR BLD MANUAL: 7.1 % (ref 5–12)
MCH RBC QN AUTO: 31.4 PG (ref 26.6–33)
MCHC RBC AUTO-ENTMCNC: 33 G/DL (ref 31.5–35.7)
MCV RBC AUTO: 95.3 FL (ref 79–97)
MONOCYTES # BLD: 0.68 10*3/MM3 (ref 0.1–0.9)
NEUTROPHILS # BLD AUTO: 5.33 10*3/MM3 (ref 1.7–7)
NEUTROPHILS NFR BLD MANUAL: 55.6 % (ref 42.7–76)
PLATELET # BLD AUTO: 336 10*3/MM3 (ref 140–450)
PMV BLD AUTO: 10.3 FL (ref 6–12)
POIKILOCYTOSIS BLD QL SMEAR: NORMAL
POLYCHROMASIA BLD QL SMEAR: NORMAL
POTASSIUM SERPL-SCNC: 4.1 MMOL/L (ref 3.5–5.2)
PROT SERPL-MCNC: 6.5 G/DL (ref 6–8.5)
RBC # BLD AUTO: 4.07 10*6/MM3 (ref 3.77–5.28)
SODIUM SERPL-SCNC: 141 MMOL/L (ref 136–145)
VARIANT LYMPHS NFR BLD MANUAL: 2 % (ref 0–5)
VARIANT LYMPHS NFR BLD MANUAL: 30.3 % (ref 19.6–45.3)
WBC MORPH BLD: NORMAL
WBC NRBC COR # BLD AUTO: 9.58 10*3/MM3 (ref 3.4–10.8)

## 2025-05-12 PROCEDURE — 1126F AMNT PAIN NOTED NONE PRSNT: CPT | Performed by: INTERNAL MEDICINE

## 2025-05-12 PROCEDURE — 36415 COLL VENOUS BLD VENIPUNCTURE: CPT

## 2025-05-12 PROCEDURE — 1159F MED LIST DOCD IN RCRD: CPT | Performed by: INTERNAL MEDICINE

## 2025-05-12 PROCEDURE — 85025 COMPLETE CBC W/AUTO DIFF WBC: CPT

## 2025-05-12 PROCEDURE — 85007 BL SMEAR W/DIFF WBC COUNT: CPT

## 2025-05-12 PROCEDURE — 82378 CARCINOEMBRYONIC ANTIGEN: CPT

## 2025-05-12 PROCEDURE — 1160F RVW MEDS BY RX/DR IN RCRD: CPT | Performed by: INTERNAL MEDICINE

## 2025-05-12 PROCEDURE — 80053 COMPREHEN METABOLIC PANEL: CPT

## 2025-05-12 PROCEDURE — 99214 OFFICE O/P EST MOD 30 MIN: CPT | Performed by: INTERNAL MEDICINE
